# Patient Record
Sex: FEMALE | Race: WHITE | NOT HISPANIC OR LATINO | Employment: FULL TIME | URBAN - METROPOLITAN AREA
[De-identification: names, ages, dates, MRNs, and addresses within clinical notes are randomized per-mention and may not be internally consistent; named-entity substitution may affect disease eponyms.]

---

## 2017-06-28 ENCOUNTER — HOSPITAL ENCOUNTER (EMERGENCY)
Facility: HOSPITAL | Age: 22
Discharge: HOME | End: 2017-06-28
Attending: EMERGENCY MEDICINE
Payer: MEDICAID

## 2017-06-28 VITALS
SYSTOLIC BLOOD PRESSURE: 124 MMHG | RESPIRATION RATE: 18 BRPM | TEMPERATURE: 97 F | WEIGHT: 160 LBS | DIASTOLIC BLOOD PRESSURE: 68 MMHG | BODY MASS INDEX: 32.25 KG/M2 | HEIGHT: 59 IN | HEART RATE: 64 BPM | OXYGEN SATURATION: 98 %

## 2017-06-28 DIAGNOSIS — H66.002 ACUTE SUPPURATIVE OTITIS MEDIA OF LEFT EAR WITHOUT SPONTANEOUS RUPTURE OF TYMPANIC MEMBRANE, RECURRENCE NOT SPECIFIED: Primary | ICD-10-CM

## 2017-06-28 DIAGNOSIS — Z71.6 TOBACCO ABUSE COUNSELING: ICD-10-CM

## 2017-06-28 PROCEDURE — 99283 EMERGENCY DEPT VISIT LOW MDM: CPT

## 2017-06-28 RX ORDER — AMOXICILLIN AND CLAVULANATE POTASSIUM 875; 125 MG/1; MG/1
1 TABLET, FILM COATED ORAL 2 TIMES DAILY
Qty: 14 TABLET | Refills: 0 | Status: SHIPPED | OUTPATIENT
Start: 2017-06-28 | End: 2018-10-26

## 2017-06-28 RX ORDER — DICLOFENAC SODIUM 50 MG/1
50 TABLET, DELAYED RELEASE ORAL 3 TIMES DAILY PRN
Qty: 20 TABLET | Refills: 0 | Status: SHIPPED | OUTPATIENT
Start: 2017-06-28 | End: 2018-10-26

## 2017-06-28 RX ORDER — DEXAMETHASONE 4 MG/1
4 TABLET ORAL DAILY
Qty: 5 TABLET | Refills: 0 | Status: SHIPPED | OUTPATIENT
Start: 2017-06-28 | End: 2017-07-03

## 2017-06-29 NOTE — ED PROVIDER NOTES
SCRIBE #1 NOTE: I, Shelli Zelaya, am scribing for, and in the presence of, Bill Anguiano NP. I have scribed the entire note.      History      Chief Complaint   Patient presents with    Otalgia     Pt reports L sided otalgia x3wks, +sore throat x3days.       Review of patient's allergies indicates:  No Known Allergies     HPI   HPI    6/28/2017, 10:16 PM   History obtained from the patient      History of Present Illness: Mayelin Lynn is a 22 y.o. female patient who presents to the Emergency Department for left sided otalgia which onset gradually 3 weeks ago, but began worsening yesterday. Sx have been intermittent and moderate in severity. No modifying factors noted. No associated sx included. Pt denies any fever, chills, cough, congestion, HA, dizziness, or neck pain. No further complaints at this time.       Arrival mode: Personal vehicle      PCP: Primary Doctor No       Past Medical History:  History reviewed. No pertinent past medical history.    Past Surgical History:  History reviewed. No pertinent surgical history.      Family History:  Reviewed. Not pertinent     Social History:  Social History     Social History Main Topics    Smoking status: Current Every Day Smoker     Packs/day: 0.50     Types: Cigarettes    Smokeless tobacco: Never Used    Alcohol use No    Drug use: Unknown    Sexual activity: Unknown        ROS   Review of Systems   Constitutional: Negative for chills, diaphoresis and fever.   HENT: Positive for ear pain (L). Negative for congestion, facial swelling, hearing loss, sore throat and trouble swallowing.    Respiratory: Negative for cough and shortness of breath.    Cardiovascular: Negative for chest pain.   Gastrointestinal: Negative for nausea and vomiting.   Genitourinary: Negative for dysuria.   Musculoskeletal: Negative for back pain.   Skin: Negative for rash.   Neurological: Negative for dizziness, weakness, light-headedness, numbness and headaches.   Hematological: Does  "not bruise/bleed easily.     Physical Exam      Initial Vitals [06/28/17 2109]   BP Pulse Resp Temp SpO2   124/68 64 18 97.3 °F (36.3 °C) 98 %      MAP       86.67          Physical Exam  Nursing Notes and Vital Signs Reviewed.  Constitutional: Patient is in no acute distress. Well-developed and well-nourished.  Head: Atraumatic. Normocephalic.  Eyes: PERRL. EOM intact. Conjunctivae are not pale. No scleral icterus.  Ears: Right TM normal. Left TM is bulging and erythematic with fluid. No perforation.   Throat: Moist mucous membranes. Posterior oropharynx is symmetric without erythema. Tonsillar exudate is not present. No trismus. Normal handling of secretions. No stridor.  Neck: Supple. Full ROM. No lymphadenopathy.  Cardiovascular: Regular rate. Regular rhythm. No murmurs, rubs, or gallops. Distal pulses are 2+ and symmetric.  Pulmonary/Chest: No respiratory distress. Clear to auscultation bilaterally. No wheezing, rales, or rhonchi.  Musculoskeletal: Moves all extremities. No obvious deformities.   Skin: Warm and dry.  Neurological:  Alert, awake, and appropriate.  Normal speech.  No acute focal neurological deficits are appreciated.  Psychiatric: Normal affect. Good eye contact. Appropriate in content.    ED Course    Procedures  ED Vital Signs:  Vitals:    06/28/17 2109   BP: 124/68   Pulse: 64   Resp: 18   Temp: 97.3 °F (36.3 °C)   TempSrc: Oral   SpO2: 98%   Weight: 72.6 kg (160 lb)   Height: 4' 11" (1.499 m)                The Emergency Provider reviewed the vital signs and test results, which are outlined above.    ED Discussion     10:21 PM Discharge: Initial encounter.  Pt assessed at this time.  Discussed exam results, shared treatment plan, f/u instructions, and specific conditions for return. Answered their questions at this time. Pt understands and agrees to the plan. Pt is stable for discharge.     Discharge Medication List as of 6/28/2017 10:22 PM      START taking these medications    Details "   amoxicillin-clavulanate 875-125mg (AUGMENTIN) 875-125 mg per tablet Take 1 tablet by mouth 2 (two) times daily., Starting Wed 6/28/2017, Print      dexamethasone (DECADRON) 4 MG Tab Take 1 tablet (4 mg total) by mouth once daily., Starting Wed 6/28/2017, Until Mon 7/3/2017, Print      diclofenac (VOLTAREN) 50 MG EC tablet Take 1 tablet (50 mg total) by mouth 3 (three) times daily as needed., Starting Wed 6/28/2017, Print             Follow-up Information     PCP. Schedule an appointment as soon as possible for a visit in 2 days.    Contact information:  [phone]           [facility] - .    Specialty:  Emergency Medicine  Why:  As needed, If symptoms worsen  Contact information:  [address] [facility] [address] [phone]                   Medical Decision Making        Additional MDM:   Smoking Cessation: The patient is a smoker. The patient was counseled on smoking cessation for: 3 minutes. The patient was counseled on tobacco related  health complications.        Scribe Attestation:   Scribe #1: I performed the above scribed service and the documentation accurately describes the services I performed. I attest to the accuracy of the note.    Attending:   Physician Attestation Statement for Scribe #1: I, Bill Anguiano NP, personally performed the services described in this documentation, as scribed by Shelli Zelaya, in my presence, and it is both accurate and complete.          Clinical Impression       ICD-10-CM ICD-9-CM   1. Acute suppurative otitis media of left ear without spontaneous rupture of tympanic membrane, recurrence not specified H66.002 382.00   2. Tobacco abuse counseling Z71.6 V65.42     305.1       Disposition:   Disposition: Discharged  Condition: Stable         Bill Anguiano NP  06/29/17 0041

## 2018-10-26 ENCOUNTER — INITIAL PRENATAL (OUTPATIENT)
Dept: OBSTETRICS AND GYNECOLOGY | Facility: CLINIC | Age: 23
End: 2018-10-26
Payer: MEDICAID

## 2018-10-26 VITALS — DIASTOLIC BLOOD PRESSURE: 58 MMHG | WEIGHT: 145.94 LBS | BODY MASS INDEX: 29.48 KG/M2 | SYSTOLIC BLOOD PRESSURE: 80 MMHG

## 2018-10-26 DIAGNOSIS — Z34.00 PRIMIGRAVIDA, ANTEPARTUM: ICD-10-CM

## 2018-10-26 PROCEDURE — 99999 PR PBB SHADOW E&M-EST. PATIENT-LVL III: CPT | Mod: PBBFAC,,, | Performed by: ADVANCED PRACTICE MIDWIFE

## 2018-10-26 PROCEDURE — 99202 OFFICE O/P NEW SF 15 MIN: CPT | Mod: TH,S$PBB,, | Performed by: ADVANCED PRACTICE MIDWIFE

## 2018-10-26 PROCEDURE — 88175 CYTOPATH C/V AUTO FLUID REDO: CPT

## 2018-10-26 PROCEDURE — 87491 CHLMYD TRACH DNA AMP PROBE: CPT

## 2018-10-26 PROCEDURE — 99213 OFFICE O/P EST LOW 20 MIN: CPT | Mod: PBBFAC,TH | Performed by: ADVANCED PRACTICE MIDWIFE

## 2018-10-26 RX ORDER — PROMETHAZINE HYDROCHLORIDE 12.5 MG/1
12.5 TABLET ORAL 4 TIMES DAILY
Qty: 30 TABLET | Refills: 2 | Status: SHIPPED | OUTPATIENT
Start: 2018-10-26 | End: 2018-12-03

## 2018-10-26 NOTE — PATIENT INSTRUCTIONS
Adapting to Pregnancy: First Trimester  As your body adjusts, you may have to change or limit your daily activities. Youll need more rest. You may also need to use the energy you have more wisely.     Eat stomach-friendly foods like cottage cheese, crackers, or bread throughout the day.   Your changing body  Almost every part of your body is affected as you adapt to pregnancy. The uterus and cervix will begin to soften right away. You may not look very pregnant during the first 3 months. But you are likely to have some common signs of early pregnancy:  · Nausea  · Fatigue  · Frequent urination  · Mood swings  · Bloating of the abdomen  · Missed or light periods (first trimester bleeding)  · Nipple or breast tenderness, breast swelling  Its not too late to start good habits  What matters most is protecting your baby from this moment on. If you smoke, drink alcohol, or use drugs, now is the time to stop. If you need help, talk with your healthcare provider.  · Smoking increases the risk of  stillbirth or having a low-birth-weight baby. If you smoke, quit now.  · Alcohol and drugs have been linked with miscarriage, birth defects, intellectual disability, and low birth weight. Do not drink alcohol or take drugs.  Tips to relieve nausea  Although nausea can happen at any time of the day, it may be worse in the morning. To help prevent nausea:  · Eat small, light meals at frequent intervals.  · Get up slowly. Eat a few unsalted crackers before you get out of bed.  · Avoid smells that bother you.  · Avoid spicy and fatty foods.  · Eat an ice pop in your favorite flavor.  · Get plenty of rest.  · Ask your healthcare provider about taking aden or vitamin B6 for nausea and vomiting.  · Talk with your healthcare provider if you take vitamins that upset your stomach.  Work concerns  The end of the first trimester is a good time to discuss working during pregnancy with your employer. Follow your healthcare providers  "advice if your job requires you to stand for a long time, work with hazardous tools, or even sit at a desk all day. Your workspace, workload, or scheduled hours may need to be adjusted. Perhaps you can change body postures more often or take an extra break.  Advice for travel  Talk to your healthcare provider first, but the second trimester may be the best time for any travel. You may be advised to avoid certain trips while youre pregnant. Food and water can be concerns in developing countries. Travel by car is a good choice, as you can stop, get out, and stretch. Bring snacks and water along. Fasten the lap belt below your belly, low over your hips. Also be sure to wear the shoulder harness.  Intimacy  Unless your healthcare provider tells you to, there is no reason to stop having sex while youre pregnant. You or your partner may notice changes in desire. Desire may be less in the first trimester, due to nausea and fatigue. In the second trimester, sex may be very enjoyable. The third trimester can be a challenge comfort-wise. Try different positions and see whats best for you both.  Date Last Reviewed: 8/16/2015  © 3236-2832 [employer]. [address] [place] [address]. All rights reserved. This information is not intended as a substitute for professional medical care. Always follow your healthcare professional's instructions.        Pregnancy: Your First Trimester Changes  The first trimester is a time of rapid development for your baby. Because your baby is growing so quickly, it is important that you start a healthy lifestyle right away. By the end of the first trimester, your baby has formed all of its major body organs and weighs just over an ounce.     Actual size of baby is 1/4"    Month 1 (Weeks 1 to 4)  The placenta (the organ that nourishes your baby) begins to form. The brain, spinal cord, heart, gastrointestinal tract, and lungs begin to develop. Your baby is about 1/4 inch " "long by the end of the first month.     Actual size of baby is 1"    Month 2 (Weeks 5 to 8)  All of your babys major body organs form. The face, fingers, toes, ears, and eyes appear. By the end of the month, your baby is about 1-inch long.     Actual size of baby is 4"    Month 3 (Weeks 9 to 12)  Your baby can open and close its fists and mouth. The sexual organs begin to form. As the first trimester ends, your baby is about 3-inches long.  Date Last Reviewed: 8/16/2015  © 8440-0122 [employer]. [address] [place] [address]. All rights reserved. This information is not intended as a substitute for professional medical care. Always follow your healthcare professional's instructions.        Comfort Tips During Pregnancy  Talk with your healthcare provider before using pain-relieving medicine at any time during your pregnancy.    First trimester tips  Nausea  · Get up slowly. Eat a few unsalted crackers before you get out of bed.  · Avoid smells that bother you.  · Eat small bland low fat, light high-protein meals at frequent intervals.  · Sip on water, weak tea, or clear soft drinks, like ginger ale. Eat ice chips.  Fatigue  · Take catnaps when you can.  · Get regular exercise.  · Accept help from others.  · Practice good sleep habits, like going to bed and getting up at the same time each day. Use your bed only for sleep and sex.  Mood swings  · Talk about your feelings with others, including other mothers.  · Limit sugar, chocolate, and caffeine.  · Eat a healthy diet. Dont skip meals.  · Get regular exercise.  Headaches  · Get fresh air and exercise.  · Relax and get enough rest.  · Check with your healthcare provider before taking any pain medicines.  Second trimester tips  Here are some suggestions to help you cope:  · To limit ankle swelling, sit with your feet raised or wear support hose.  · If you have pain in your groin and stomach (round ligament pain), avoid sudden twisting " movements.  · For leg cramps, flexing your foot often brings immediate relief. You also may try massaging your calf in long, downward strokes, or stretching your legs before going to bed. Get enough exercise and wear shoes with flexible soles.  Third trimester tips  Reducing heartburn  · Eat small, light meals throughout the day rather than 3 large ones.  · Sleep with your upper body raised 6 inches. Dont lie down until 2 hours after you eat.  Treating constipation  · Eat foods high in fiber (whole-grain foods, fresh fruit and vegetables).  · Drink plenty of water.  · Get regular exercise.  · Discuss other medicines (like docusate and psyllium) with your healthcare provider.  Taking care of your breasts  · Avoid using harsh soaps or alcohol, which can cause excessive dryness.  · Wear nursing bras. They provide more support than regular bras and can be used after pregnancy if you breastfeed.  Getting a good nights sleep  · Take a warm shower before bed.  · Sleep on a firm mattress.  · Lie on your side with 1 leg crossed over the other.  · Use pillows to support arms, legs, and belly.  Date Last Reviewed: 8/16/2015  © 2617-3914 [employer]. [address] [place] [address], [place] [address]. All rights reserved. This information is not intended as a substitute for professional medical care. Always follow your healthcare professional's instructions.

## 2018-10-26 NOTE — PROGRESS NOTES
New OB today.    Consents signed.    Pap, gonorrhea and Chlamydia swab today.    Prenatal labs today.    Ultrasound and visit in 1-2 weeks.    Prenatal vitamins daily.    First trimester precautions.      Coffective counseling sheet Get Ready discussed with mother. Reinforced avoiding induction of labor unless medically indicated as well as comfort measures during labor.  Encouraged mother to download Coffective mobile kathia if she has not already done so. Mother verbalizes understanding.

## 2018-10-27 LAB
C TRACH DNA SPEC QL NAA+PROBE: NOT DETECTED
N GONORRHOEA DNA SPEC QL NAA+PROBE: NOT DETECTED

## 2018-11-08 ENCOUNTER — ROUTINE PRENATAL (OUTPATIENT)
Dept: OBSTETRICS AND GYNECOLOGY | Facility: CLINIC | Age: 23
End: 2018-11-08
Payer: MEDICAID

## 2018-11-08 ENCOUNTER — PROCEDURE VISIT (OUTPATIENT)
Dept: OBSTETRICS AND GYNECOLOGY | Facility: CLINIC | Age: 23
End: 2018-11-08
Payer: MEDICAID

## 2018-11-08 ENCOUNTER — LAB VISIT (OUTPATIENT)
Dept: LAB | Facility: HOSPITAL | Age: 23
End: 2018-11-08
Attending: ADVANCED PRACTICE MIDWIFE
Payer: MEDICAID

## 2018-11-08 VITALS
SYSTOLIC BLOOD PRESSURE: 100 MMHG | WEIGHT: 147.63 LBS | BODY MASS INDEX: 29.81 KG/M2 | DIASTOLIC BLOOD PRESSURE: 70 MMHG

## 2018-11-08 DIAGNOSIS — Z34.00 PRIMIGRAVIDA, ANTEPARTUM: ICD-10-CM

## 2018-11-08 DIAGNOSIS — Z34.81 ENCOUNTER FOR SUPERVISION OF OTHER NORMAL PREGNANCY IN FIRST TRIMESTER: Primary | ICD-10-CM

## 2018-11-08 LAB
ABO + RH BLD: NORMAL
BASOPHILS # BLD AUTO: 0.05 K/UL
BASOPHILS NFR BLD: 0.5 %
BLD GP AB SCN CELLS X3 SERPL QL: NORMAL
DIFFERENTIAL METHOD: NORMAL
EOSINOPHIL # BLD AUTO: 0.1 K/UL
EOSINOPHIL NFR BLD: 0.5 %
ERYTHROCYTE [DISTWIDTH] IN BLOOD BY AUTOMATED COUNT: 12.5 %
HCT VFR BLD AUTO: 38.7 %
HGB BLD-MCNC: 13.2 G/DL
IMM GRANULOCYTES # BLD AUTO: 0.03 K/UL
IMM GRANULOCYTES NFR BLD AUTO: 0.3 %
LYMPHOCYTES # BLD AUTO: 2.3 K/UL
LYMPHOCYTES NFR BLD: 23.4 %
MCH RBC QN AUTO: 28.7 PG
MCHC RBC AUTO-ENTMCNC: 34.1 G/DL
MCV RBC AUTO: 84 FL
MONOCYTES # BLD AUTO: 0.6 K/UL
MONOCYTES NFR BLD: 5.7 %
NEUTROPHILS # BLD AUTO: 6.9 K/UL
NEUTROPHILS NFR BLD: 69.6 %
NRBC BLD-RTO: 0 /100 WBC
PLATELET # BLD AUTO: 284 K/UL
PMV BLD AUTO: 11 FL
RBC # BLD AUTO: 4.6 M/UL
RH BLD: NORMAL
WBC # BLD AUTO: 9.88 K/UL

## 2018-11-08 PROCEDURE — 86762 RUBELLA ANTIBODY: CPT

## 2018-11-08 PROCEDURE — 86592 SYPHILIS TEST NON-TREP QUAL: CPT

## 2018-11-08 PROCEDURE — 36415 COLL VENOUS BLD VENIPUNCTURE: CPT | Mod: PO

## 2018-11-08 PROCEDURE — 85025 COMPLETE CBC W/AUTO DIFF WBC: CPT

## 2018-11-08 PROCEDURE — 76801 OB US < 14 WKS SINGLE FETUS: CPT | Mod: 26,S$PBB,, | Performed by: OBSTETRICS & GYNECOLOGY

## 2018-11-08 PROCEDURE — 99213 OFFICE O/P EST LOW 20 MIN: CPT | Mod: TH,S$PBB,, | Performed by: ADVANCED PRACTICE MIDWIFE

## 2018-11-08 PROCEDURE — 76801 OB US < 14 WKS SINGLE FETUS: CPT | Mod: PBBFAC,PO | Performed by: OBSTETRICS & GYNECOLOGY

## 2018-11-08 PROCEDURE — 87340 HEPATITIS B SURFACE AG IA: CPT

## 2018-11-08 PROCEDURE — 86901 BLOOD TYPING SEROLOGIC RH(D): CPT | Mod: 91

## 2018-11-08 PROCEDURE — 86703 HIV-1/HIV-2 1 RESULT ANTBDY: CPT

## 2018-11-08 PROCEDURE — 99212 OFFICE O/P EST SF 10 MIN: CPT | Mod: PBBFAC,TH,PO,25 | Performed by: ADVANCED PRACTICE MIDWIFE

## 2018-11-08 PROCEDURE — 86901 BLOOD TYPING SEROLOGIC RH(D): CPT

## 2018-11-08 PROCEDURE — 99999 PR PBB SHADOW E&M-EST. PATIENT-LVL II: CPT | Mod: PBBFAC,,, | Performed by: ADVANCED PRACTICE MIDWIFE

## 2018-11-08 NOTE — PROGRESS NOTES
Doing well  U/s NEETA different by over a week. New NEETA 6/9. Pt aware  Plans breastfeeding and probably epidural  New ob labs today

## 2018-11-09 LAB
HBV SURFACE AG SERPL QL IA: NEGATIVE
HIV 1+2 AB+HIV1 P24 AG SERPL QL IA: NEGATIVE
RPR SER QL: NORMAL
RUBV IGG SER-ACNC: 18.1 IU/ML
RUBV IGG SER-IMP: REACTIVE

## 2018-11-12 PROBLEM — O26.899 RH NEGATIVE, ANTEPARTUM: Status: ACTIVE | Noted: 2018-11-12

## 2018-11-12 PROBLEM — Z67.91 RH NEGATIVE, ANTEPARTUM: Status: ACTIVE | Noted: 2018-11-12

## 2018-12-03 ENCOUNTER — ROUTINE PRENATAL (OUTPATIENT)
Dept: OBSTETRICS AND GYNECOLOGY | Facility: CLINIC | Age: 23
End: 2018-12-03
Payer: MEDICAID

## 2018-12-03 VITALS
BODY MASS INDEX: 30.01 KG/M2 | SYSTOLIC BLOOD PRESSURE: 112 MMHG | WEIGHT: 148.56 LBS | DIASTOLIC BLOOD PRESSURE: 62 MMHG

## 2018-12-03 DIAGNOSIS — Z34.82 ENCOUNTER FOR SUPERVISION OF OTHER NORMAL PREGNANCY IN SECOND TRIMESTER: Primary | ICD-10-CM

## 2018-12-03 PROCEDURE — 99999 PR PBB SHADOW E&M-EST. PATIENT-LVL II: CPT | Mod: PBBFAC,,, | Performed by: ADVANCED PRACTICE MIDWIFE

## 2018-12-03 PROCEDURE — 99213 OFFICE O/P EST LOW 20 MIN: CPT | Mod: TH,S$PBB,, | Performed by: ADVANCED PRACTICE MIDWIFE

## 2018-12-03 PROCEDURE — 99212 OFFICE O/P EST SF 10 MIN: CPT | Mod: PBBFAC,TH,PO | Performed by: ADVANCED PRACTICE MIDWIFE

## 2018-12-03 NOTE — PROGRESS NOTES
Doing well  Still with a little nausea, but much better  Discussed quad screen for next visit. Will consider    Coffective counseling sheet Fall In Love discussed with mother. Reinforced immediate skin to skin, the magic first hour, importance of the first feeding and delaying routine procedures. Encouraged mother to download Coffective mobile kathia if she has not already done so. Mother verbalizes understanding.

## 2018-12-11 ENCOUNTER — HOSPITAL ENCOUNTER (EMERGENCY)
Facility: HOSPITAL | Age: 23
Discharge: HOME | End: 2018-12-11
Attending: EMERGENCY MEDICINE
Payer: MEDICAID

## 2018-12-11 VITALS
WEIGHT: 149.06 LBS | BODY MASS INDEX: 30.05 KG/M2 | TEMPERATURE: 98 F | HEART RATE: 76 BPM | HEIGHT: 59 IN | DIASTOLIC BLOOD PRESSURE: 80 MMHG | SYSTOLIC BLOOD PRESSURE: 129 MMHG | OXYGEN SATURATION: 99 % | RESPIRATION RATE: 18 BRPM

## 2018-12-11 DIAGNOSIS — R10.32 BILATERAL LOWER ABDOMINAL CRAMPING: Primary | ICD-10-CM

## 2018-12-11 DIAGNOSIS — R10.31 BILATERAL LOWER ABDOMINAL CRAMPING: Primary | ICD-10-CM

## 2018-12-11 DIAGNOSIS — N93.9 VAGINAL SPOTTING: ICD-10-CM

## 2018-12-11 LAB
BACTERIA #/AREA URNS HPF: NORMAL /HPF
BILIRUB UR QL STRIP: NEGATIVE
CLARITY UR: ABNORMAL
COLOR UR: YELLOW
GLUCOSE UR QL STRIP: NEGATIVE
HGB UR QL STRIP: NEGATIVE
KETONES UR QL STRIP: ABNORMAL
LEUKOCYTE ESTERASE UR QL STRIP: ABNORMAL
MICROSCOPIC COMMENT: NORMAL
NITRITE UR QL STRIP: NEGATIVE
PH UR STRIP: 6 [PH] (ref 5–8)
PROT UR QL STRIP: NEGATIVE
SP GR UR STRIP: 1.01 (ref 1–1.03)
SQUAMOUS #/AREA URNS HPF: 20 /HPF
URN SPEC COLLECT METH UR: ABNORMAL
UROBILINOGEN UR STRIP-ACNC: NEGATIVE EU/DL
WBC #/AREA URNS HPF: 5 /HPF (ref 0–5)

## 2018-12-11 PROCEDURE — 81000 URINALYSIS NONAUTO W/SCOPE: CPT

## 2018-12-11 PROCEDURE — 99284 EMERGENCY DEPT VISIT MOD MDM: CPT

## 2018-12-12 NOTE — ED PROVIDER NOTES
SCRIBE #1 NOTE: I, Hang Martin, am scribing for, and in the presence of, Nash Lafleur Jr., WILLEM. I have scribed the entire note.       History     Chief Complaint   Patient presents with    Vaginal Bleeding     mild bright red bleeding after wiping PTA. no passing clots. +lower abd cramping +14 weeks preg.      Review of patient's allergies indicates:  No Known Allergies      History of Present Illness     HPI    12/11/2018, 6:56 PM  History obtained from the patient      History of Present Illness: Mayelin Lynn is a 23 y.o. female patient who presents to the Emergency Department for evaluation of vaginal bleeding which onset suddenly this evening. Pt presents to ED for further evaluation after noting blood when wiping. Pt reports she is currently 14 weeks gestation and had intercourse this morning night. Pt is followed by Tameka (Midwife) and denies complications with her current pregnancy. Symptoms are intermittent and moderate in severity. Exacerbated by wiping and relieved by nothing. Associated sxs include lower abdominal pain. Patient denies any fever, chills, N/V/D, abdominal distention, dysuria, hematuria, vaginal discharge, dysuria, hematuria, urinary urgency/frequency, HA, weakness, and all other sxs at this time. No further complaints or concerns at this time.     Arrival mode: Personal vehicle    PCP: Primary Doctor No        Past Medical History:  History reviewed, not pertinent.      Past Surgical History:  Past Surgical History:   Procedure Laterality Date    ORAL ANTRAL FISTULA CLOSURE      TONSILLECTOMY, ADENOIDECTOMY           Family History:  Family History   Adopted: Yes       Social History:  Social History     Tobacco Use    Smoking status: Former Smoker     Packs/day: 0.50     Types: Cigarettes    Smokeless tobacco: Never Used   Substance and Sexual Activity    Alcohol use: No    Drug use: No    Sexual activity: Yes     Partners: Male     Birth control/protection: None         Review of Systems     Review of Systems   Constitutional: Negative for chills and fever.   HENT: Negative for congestion and trouble swallowing.    Respiratory: Negative for cough and shortness of breath.    Cardiovascular: Negative for chest pain.   Gastrointestinal: Positive for abdominal pain (Lower). Negative for abdominal distention, blood in stool, constipation, diarrhea, nausea and vomiting.   Genitourinary: Positive for vaginal bleeding (With wiping). Negative for decreased urine volume, difficulty urinating, dysuria, frequency, hematuria and vaginal discharge.   Musculoskeletal: Negative for back pain, gait problem and neck pain.   Skin: Negative for rash.   Neurological: Negative for dizziness, syncope, weakness, light-headedness, numbness and headaches.   Psychiatric/Behavioral: Negative for confusion.   All other systems reviewed and are negative.     Physical Exam     Initial Vitals [12/11/18 1846]   BP Pulse Resp Temp SpO2   129/80 76 18 98.1 °F (36.7 °C) 99 %      MAP       --          Physical Exam  Nursing Notes and Vital Signs Reviewed.  Constitutional: Patient is in no acute distress. Well-developed and well-nourished.  Head: Atraumatic. Normocephalic.  Eyes: PERRL. EOM intact. Conjunctivae are not pale. No scleral icterus.  ENT: Mucous membranes are moist. Oropharynx is clear and symmetric.    Neck: Supple. Full ROM. No lymphadenopathy.  Cardiovascular: Regular rate. Regular rhythm.  Pulmonary/Chest: No respiratory distress. Clear to auscultation bilaterally. No wheezing or rales.  Abdominal: Soft and non-distended.  Mild lower abdominal tenderness.  No rebound, guarding, or rigidity.   Genitourinary: No CVA tenderness  Pelvic: A female chaperone was present for this examination. Nl external inspection. No lesions or abnormalities were visible on the labia majora or minora. Cervical os is closed. There is no CMT. There is no blood in the vaginal vault. No discharge. No adnexal tenderness. No  "adnexal masses.  Musculoskeletal: Moves all extremities. No obvious deformities.  Skin: Warm and dry.  Neurological:  Alert, awake, and appropriate.  Normal speech.  No acute focal neurological deficits are appreciated.  Psychiatric: Normal affect. Good eye contact. Anxious.     ED Course   Procedures  ED Vital Signs:  Vitals:    12/11/18 1846   BP: 129/80   Pulse: 76   Resp: 18   Temp: 98.1 °F (36.7 °C)   TempSrc: Oral   SpO2: 99%   Weight: 67.6 kg (149 lb 0.5 oz)   Height: 4' 11" (1.499 m)       Abnormal Lab Results:  Labs Reviewed   URINALYSIS, REFLEX TO URINE CULTURE - Abnormal; Notable for the following components:       Result Value    Appearance, UA Hazy (*)     Ketones, UA Trace (*)     Leukocytes, UA 2+ (*)     All other components within normal limits    Narrative:     Preferred Collection Type->Urine, Clean Catch   URINALYSIS MICROSCOPIC    Narrative:     Preferred Collection Type->Urine, Clean Catch        All Lab Results:  Results for orders placed or performed during the hospital encounter of 12/11/18   Urinalysis, Reflex to Urine Culture Urine, Clean Catch   Result Value Ref Range    Specimen UA Urine, Clean Catch     Color, UA Yellow Yellow, Straw, [place]    Appearance, UA Hazy (A) Clear    pH, UA 6.0 5.0 - 8.0    Specific Gravity, UA 1.010 1.005 - 1.030    Protein, UA Negative Negative    Glucose, UA Negative Negative    Ketones, UA Trace (A) Negative    Bilirubin (UA) Negative Negative    Occult Blood UA Negative Negative    Nitrite, UA Negative Negative    Urobilinogen, UA Negative <2.0 EU/dL    Leukocytes, UA 2+ (A) Negative   Urinalysis Microscopic   Result Value Ref Range    WBC, UA 5 0 - 5 /hpf    Bacteria, UA Occasional None-Occ /hpf    Squam Epithel, UA 20 /hpf    Microscopic Comment SEE COMMENT               The Emergency Provider reviewed the vital signs and test results, which are outlined above.     ED Discussion     7:12 PM: Post fetal movement and cardiac activity noted with bed side " US.    7:33 PM: Reassessed pt at this time. Pt is awake, alert, and in NAD. Pt states her condition has improved at this time. Discussed with pt all pertinent ED information and results. Discussed pt dx and plan of tx. Gave pt all f/u and return to the ED instructions. All questions and concerns were addressed at this time. Pt expresses understanding of information and instructions, and is comfortable with plan to discharge. Pt is stable for discharge.    I discussed with patient and/or family/caretaker that evaluation in the ED does not suggest any emergent or life threatening medical conditions requiring immediate intervention beyond what was provided in the ED, and I believe patient is safe for discharge.  Regardless, an unremarkable evaluation in the ED does not preclude the development or presence of a serious of life threatening condition. As such, patient was instructed to return immediately for any worsening or change in current symptoms.        ED Medication(s):  Medications - No data to display      Follow-up Information     Tameka Eubanks CNM In 3 days.    Specialty:  Obstetrics  Contact information:  [address] [facility] [id number] [phone]             [facility] - .    Specialty:  Emergency Medicine  Why:  If symptoms worsen  Contact information:  [address] [facility] [address] [phone]                       Medical Decision Making:   Clinical Tests:   Lab Tests: Ordered and Reviewed             Scribe Attestation:   Scribe #1: I performed the above scribed service and the documentation accurately describes the services I performed. I attest to the accuracy of the note.     Attending:   Physician Attestation Statement for Scribe #1: I, Nash Lafleur Jr., WILLEM, personally performed the services described in this documentation, as scribed by Hang Martin, in my presence, and it is both accurate and complete.           Clinical Impression        ICD-10-CM ICD-9-CM   1. Bilateral lower abdominal cramping R10.31 789.09    R10.32    2. Vaginal spotting N93.9 623.8       Disposition:   Disposition: Discharged  Condition: Stable         Nash Lafleur Jr., [facility]  12/11/18 7675

## 2019-01-08 ENCOUNTER — TELEPHONE (OUTPATIENT)
Dept: OBSTETRICS AND GYNECOLOGY | Facility: CLINIC | Age: 24
End: 2019-01-08

## 2019-01-08 NOTE — TELEPHONE ENCOUNTER
Spoke with patient; reminded her we just asked her if she wanted to come in a little earlier.  Never said she had to reschedule. Patient states she can come at 1:30.

## 2019-01-08 NOTE — TELEPHONE ENCOUNTER
Pt. Called and stated that she received a call to reschedule her appointment because sarthak would not be in office on that day. Pt. Is upset because this is the second time that she has had to reschedule and her boyfriend took off this day to come with her. Pt. Offered patient appt. At the [facility] location. Pt. Declined and stated she only wants to be seen in [place] by AMELIA Enlgish. Pt. States the earliest she can come on 01/11/19 is at 1:30pm. Informed patient that I will send this to sarthak's staff to see if she can be worked into the schedule. Pt. Verbalized understanding.

## 2019-01-30 ENCOUNTER — ROUTINE PRENATAL (OUTPATIENT)
Dept: OBSTETRICS AND GYNECOLOGY | Facility: CLINIC | Age: 24
End: 2019-01-30
Payer: MEDICAID

## 2019-01-30 ENCOUNTER — PROCEDURE VISIT (OUTPATIENT)
Dept: OBSTETRICS AND GYNECOLOGY | Facility: CLINIC | Age: 24
End: 2019-01-30
Payer: MEDICAID

## 2019-01-30 VITALS
DIASTOLIC BLOOD PRESSURE: 68 MMHG | SYSTOLIC BLOOD PRESSURE: 108 MMHG | BODY MASS INDEX: 32.33 KG/M2 | WEIGHT: 160.06 LBS

## 2019-01-30 DIAGNOSIS — Z34.00 PRIMIGRAVIDA, ANTEPARTUM: Primary | ICD-10-CM

## 2019-01-30 DIAGNOSIS — F41.9 ANXIETY AND DEPRESSION: ICD-10-CM

## 2019-01-30 DIAGNOSIS — Z36.3 ANTENATAL SCREENING FOR MALFORMATION USING ULTRASONICS: ICD-10-CM

## 2019-01-30 DIAGNOSIS — F41.9 ANXIETY: ICD-10-CM

## 2019-01-30 DIAGNOSIS — F32.A ANXIETY AND DEPRESSION: ICD-10-CM

## 2019-01-30 DIAGNOSIS — Z3A.21 21 WEEKS GESTATION OF PREGNANCY: ICD-10-CM

## 2019-01-30 PROCEDURE — 99212 OFFICE O/P EST SF 10 MIN: CPT | Mod: TH,S$PBB,, | Performed by: MIDWIFE

## 2019-01-30 PROCEDURE — 99999 PR PBB SHADOW E&M-EST. PATIENT-LVL III: ICD-10-PCS | Mod: PBBFAC,,, | Performed by: MIDWIFE

## 2019-01-30 PROCEDURE — 99999 PR PBB SHADOW E&M-EST. PATIENT-LVL III: CPT | Mod: PBBFAC,,, | Performed by: MIDWIFE

## 2019-01-30 PROCEDURE — 76805 OB US >/= 14 WKS SNGL FETUS: CPT | Mod: 26,S$PBB,, | Performed by: OBSTETRICS & GYNECOLOGY

## 2019-01-30 PROCEDURE — 76805 OB US >/= 14 WKS SNGL FETUS: CPT | Mod: PBBFAC | Performed by: OBSTETRICS & GYNECOLOGY

## 2019-01-30 PROCEDURE — 76805 PR US, OB 14+WKS, TRANSABD, SINGLE GESTATION: ICD-10-PCS | Mod: 26,S$PBB,, | Performed by: OBSTETRICS & GYNECOLOGY

## 2019-01-30 PROCEDURE — 99213 OFFICE O/P EST LOW 20 MIN: CPT | Mod: PBBFAC,TH | Performed by: MIDWIFE

## 2019-01-30 PROCEDURE — 99212 PR OFFICE/OUTPT VISIT, EST, LEVL II, 10-19 MIN: ICD-10-PCS | Mod: TH,S$PBB,, | Performed by: MIDWIFE

## 2019-01-30 RX ORDER — HYDROXYZINE PAMOATE 50 MG/1
50 CAPSULE ORAL EVERY 6 HOURS PRN
Qty: 30 CAPSULE | Refills: 0 | Status: SHIPPED | OUTPATIENT
Start: 2019-01-30 | End: 2019-03-21

## 2019-01-30 NOTE — PATIENT INSTRUCTIONS
If You Are Rh Negative     A Rho(D) immune globulin injection protects against Rh disease in this and future pregnancies.   If youre Rh negative, ask your healthcare provider about getting treated with Rho(D) immune globulin. Even if you miscarry or dont deliver the baby, you will still need treatment. The health of any baby you have in the future depends on it.  When are you treated?  If your blood has not formed Rh antibodies, youll be treated during week 28 of your pregnancy. You also may be treated any time theres a chance that fetal blood has mixed with yours. For example, this might be after an amniocentesis, a prenatal test. Or it might be if you have vaginal bleeding earlier than 28 weeks. Treatment is an injection of a medicine called Rho(D) immune globulin. Rho(D) immune globulin stops Rh antibodies from forming. It wont harm you or the fetus. After you give birth, your babys blood will be tested. If its Rh positive, youll be given Rho(D) immune globulin again within 3 days. If its Rh negative, you wont need Rho(D) immune globulin until your next pregnancy.  Preventing future problems  Your chance of forming Rh antibodies increases with each pregnancy. This is true even for an ectopic pregnancy (the fertilized egg is outside the uterus). It is also true for pregnancies that end in miscarriage or . In these cases, you will most likely get a Rho(D) immune globulin injection. This is because your body can make Rh antibodies even if you dont deliver a baby. Rh antibodies can cause problems in future pregnancies.  If you have Rh antibodies  If antibodies have already formed (sensitization), Rho(D) immune globulin cant protect the fetus. You and the fetus will need special care during pregnancy. Your healthcare provider will explain the details to you.   Date Last Reviewed: 2016  © 0231-2065 [employer]. [address] [place] [address], [place] [address]. All rights reserved. This  information is not intended as a substitute for professional medical care. Always follow your healthcare professional's instructions.        Rh Typing  Does this test have other names?  Rh incompatibility, Rh factor  What is this test?  Rhesus (Rh) typing is used to determine whether you have a specific protein called Rh factor on the outer layer of your red blood cells. If you do not have Rh factor in your blood, you are Rh negative. If you do have Rh factor in your blood, you are Rh positive. Most people are Rh positive, but Rh negative blood types can be passed down from parents to children.  Rh typing is important during pregnancy. If you are Rh negative and your baby is Rh positive, you may have an Rh incompatibility. During pregnancy, it's possible that some of your baby's blood could pass through the placenta into your body. Your body may react to the baby's blood as a foreign substance and make antibodies against it. This can cause a miscarriage, anemia, and problems in later pregnancies. The first pregnancy is usually not affected by Rh incompatibility because the baby is often born before many of the antibodies develop. These risks can be lowered by a shot or shots of Rh immunoglobulin during each pregnancy.  Why do I need this test?  Pregnant women typically have this test at their first prenatal appointment. You may need this test if you:  · Are pregnant  · Are thinking about becoming pregnant  · Are the father of an unborn baby  · Are Rh negative and having a baby with a man who is Rh positive  · Need a blood transfusion  · Are donating blood  What other tests might I have along with this test?  If you have an Rh typing test and the results show you are Rh negative, you may need to have another blood test called an antibody screen. The antibody screen looks for antibodies in your blood. This means that you have been exposed to Rh positive blood and developed antibodies to it. You may need multiple  antibody screens during your pregnancy and one at the time of delivery.  You may also need an amniocentesis if the baby's father cannot or will not have the Rh typing blood test. During amniocentesis, a needle is put into the uterus to remove a small amount of amniotic fluid for testing.  What do my test results mean?  Many things may affect your lab test results. These include the method each lab uses to do the test. Even if your test results are different from the normal value, you may not have a problem. To learn what the results mean for you, talk with your healthcare provider.  Having Rh factor in your blood means you are Rh positive. If you do not have Rh factor in your blood, you are Rh negative. If you are Rh negative and having a baby with a man who is Rh positive, you could be at risk for pregnancy complications.  How is this test done?  The test requires a blood sample, which is drawn through a needle from a vein in your arm.  Does this test pose any risks?  The risks are minor. The finger prick or needle may feel uncomfortable or painful. You may experience bruising, soreness, or pain in your hand or arm at the puncture spot. These symptoms usually go away soon after the test is over.  What might affect my test results?  Nothing is likely to affect your test results. This includes your diet, lifestyle, and medicines.  How do I get ready for this test?  A blood test rarely requires any preparation. You can probably eat, drink, and take any medicine as usual, but check with your healthcare provider to be sure. Remember to tell your provider about any medicines or supplements that you take.      © 0052-1030 [employer]. [address] [place] [address], [place] [address]. All rights reserved. This information is not intended as a substitute for professional medical care. Always follow your healthcare professional's instructions.        Rh-Negative Screening    If you have Rh-negative blood, your baby  may be at risk for health problems. This is true only if your baby has Rh-positive blood. A simple test followed by treatment can help prevent problems.  What are the risks?  If the blood of your baby is Rh positive, your Rh-negative blood may form antibodies. These antibodies will attack the Rh-positive blood. This is called Rh disease. Rh disease can cause your baby to lose blood cells or have other health problems. Medical treatment can prevent Rh disease by keeping antibodies from forming.  How are you tested?  A simple blood test shows if youre Rh negative. This test is done very early in your pregnancy. If youre Rh negative, youll have a second blood test near week 28 of pregnancy. This test will check whether or not your blood contains Rh antibodies.  Date Last Reviewed: 11/1/2016  © 1294-0141 [employer]. [address] [place] [address], [place] [address]. All rights reserved. This information is not intended as a substitute for professional medical care. Always follow your healthcare professional's instructions.

## 2019-01-30 NOTE — PROGRESS NOTES
Doing well, good fm  Pt has not been seen for several weeks, mom  shortly after last visit of overdose  Pt is battling with depression, desires counseling, referral made to Psych  Some anxiety, rx for vistaril  Anatomy US today  rtc 4 wks

## 2019-02-28 ENCOUNTER — ROUTINE PRENATAL (OUTPATIENT)
Dept: OBSTETRICS AND GYNECOLOGY | Facility: CLINIC | Age: 24
End: 2019-02-28
Payer: MEDICAID

## 2019-02-28 VITALS
BODY MASS INDEX: 32.55 KG/M2 | WEIGHT: 161.19 LBS | SYSTOLIC BLOOD PRESSURE: 120 MMHG | DIASTOLIC BLOOD PRESSURE: 70 MMHG

## 2019-02-28 DIAGNOSIS — Z34.82 ENCOUNTER FOR SUPERVISION OF OTHER NORMAL PREGNANCY IN SECOND TRIMESTER: Primary | ICD-10-CM

## 2019-02-28 PROBLEM — Z34.00 PRIMIGRAVIDA, ANTEPARTUM: Status: RESOLVED | Noted: 2018-10-26 | Resolved: 2019-02-28

## 2019-02-28 PROCEDURE — 99212 OFFICE O/P EST SF 10 MIN: CPT | Mod: PBBFAC,TH,PO | Performed by: ADVANCED PRACTICE MIDWIFE

## 2019-02-28 PROCEDURE — 99213 PR OFFICE/OUTPT VISIT, EST, LEVL III, 20-29 MIN: ICD-10-PCS | Mod: TH,S$PBB,, | Performed by: ADVANCED PRACTICE MIDWIFE

## 2019-02-28 PROCEDURE — 99213 OFFICE O/P EST LOW 20 MIN: CPT | Mod: TH,S$PBB,, | Performed by: ADVANCED PRACTICE MIDWIFE

## 2019-02-28 PROCEDURE — 99999 PR PBB SHADOW E&M-EST. PATIENT-LVL II: CPT | Mod: PBBFAC,,, | Performed by: ADVANCED PRACTICE MIDWIFE

## 2019-02-28 PROCEDURE — 99999 PR PBB SHADOW E&M-EST. PATIENT-LVL II: ICD-10-PCS | Mod: PBBFAC,,, | Performed by: ADVANCED PRACTICE MIDWIFE

## 2019-02-28 NOTE — PROGRESS NOTES
Doing well  28 week labs and rhogam next visit  PTL talk  Questions answered  List of psych providers provided to pt. She would like to start counseling secondary to grieving her mothers death    [employer] counseling sheet Nourish discussed with mother. Reinforced basic breastfeeding position and latch as well as proper hand expression technique. Encouraged mother to download [employer] mobile kathia if she has not already done so.  Mother verbalizes understanding.

## 2019-03-21 ENCOUNTER — ROUTINE PRENATAL (OUTPATIENT)
Dept: OBSTETRICS AND GYNECOLOGY | Facility: CLINIC | Age: 24
End: 2019-03-21
Payer: MEDICAID

## 2019-03-21 ENCOUNTER — LAB VISIT (OUTPATIENT)
Dept: LAB | Facility: HOSPITAL | Age: 24
End: 2019-03-21
Attending: ADVANCED PRACTICE MIDWIFE
Payer: MEDICAID

## 2019-03-21 VITALS
BODY MASS INDEX: 33.26 KG/M2 | SYSTOLIC BLOOD PRESSURE: 120 MMHG | DIASTOLIC BLOOD PRESSURE: 68 MMHG | WEIGHT: 164.69 LBS

## 2019-03-21 DIAGNOSIS — Z34.83 ENCOUNTER FOR SUPERVISION OF OTHER NORMAL PREGNANCY IN THIRD TRIMESTER: Primary | ICD-10-CM

## 2019-03-21 DIAGNOSIS — Z34.82 ENCOUNTER FOR SUPERVISION OF OTHER NORMAL PREGNANCY IN SECOND TRIMESTER: ICD-10-CM

## 2019-03-21 LAB
ABO + RH BLD: NORMAL
BASOPHILS # BLD AUTO: 0.05 K/UL
BASOPHILS NFR BLD: 0.4 %
BLD GP AB SCN CELLS X3 SERPL QL: NORMAL
DIFFERENTIAL METHOD: ABNORMAL
EOSINOPHIL # BLD AUTO: 0.1 K/UL
EOSINOPHIL NFR BLD: 0.9 %
ERYTHROCYTE [DISTWIDTH] IN BLOOD BY AUTOMATED COUNT: 13.2 %
GLUCOSE SERPL-MCNC: 124 MG/DL
HCT VFR BLD AUTO: 31.2 %
HGB BLD-MCNC: 10.1 G/DL
IMM GRANULOCYTES # BLD AUTO: 0.14 K/UL
IMM GRANULOCYTES NFR BLD AUTO: 1 %
LYMPHOCYTES # BLD AUTO: 2.1 K/UL
LYMPHOCYTES NFR BLD: 15.3 %
MCH RBC QN AUTO: 28.2 PG
MCHC RBC AUTO-ENTMCNC: 32.4 G/DL
MCV RBC AUTO: 87 FL
MONOCYTES # BLD AUTO: 1 K/UL
MONOCYTES NFR BLD: 7.1 %
NEUTROPHILS # BLD AUTO: 10.5 K/UL
NEUTROPHILS NFR BLD: 75.3 %
NRBC BLD-RTO: 0 /100 WBC
PLATELET # BLD AUTO: 316 K/UL
PMV BLD AUTO: 11.3 FL
RBC # BLD AUTO: 3.58 M/UL
WBC # BLD AUTO: 13.88 K/UL

## 2019-03-21 PROCEDURE — 86703 HIV-1/HIV-2 1 RESULT ANTBDY: CPT

## 2019-03-21 PROCEDURE — 99999 PR PBB SHADOW E&M-EST. PATIENT-LVL II: CPT | Mod: PBBFAC,,, | Performed by: ADVANCED PRACTICE MIDWIFE

## 2019-03-21 PROCEDURE — 90471 IMMUNIZATION ADMIN: CPT | Mod: PBBFAC,PO

## 2019-03-21 PROCEDURE — 36415 COLL VENOUS BLD VENIPUNCTURE: CPT | Mod: PO

## 2019-03-21 PROCEDURE — 86901 BLOOD TYPING SEROLOGIC RH(D): CPT

## 2019-03-21 PROCEDURE — 99212 OFFICE O/P EST SF 10 MIN: CPT | Mod: PBBFAC,TH,PO | Performed by: ADVANCED PRACTICE MIDWIFE

## 2019-03-21 PROCEDURE — 99213 OFFICE O/P EST LOW 20 MIN: CPT | Mod: TH,S$PBB,, | Performed by: ADVANCED PRACTICE MIDWIFE

## 2019-03-21 PROCEDURE — 99213 PR OFFICE/OUTPT VISIT, EST, LEVL III, 20-29 MIN: ICD-10-PCS | Mod: TH,S$PBB,, | Performed by: ADVANCED PRACTICE MIDWIFE

## 2019-03-21 PROCEDURE — 86592 SYPHILIS TEST NON-TREP QUAL: CPT

## 2019-03-21 PROCEDURE — 85025 COMPLETE CBC W/AUTO DIFF WBC: CPT

## 2019-03-21 PROCEDURE — 99999 PR PBB SHADOW E&M-EST. PATIENT-LVL II: ICD-10-PCS | Mod: PBBFAC,,, | Performed by: ADVANCED PRACTICE MIDWIFE

## 2019-03-21 PROCEDURE — 82950 GLUCOSE TEST: CPT

## 2019-03-21 NOTE — PROGRESS NOTES
Doing well  28 week labs today  Rhogam and Tdap given  Questions answered    Patient viewed [employer]s video, Nourish and was provided with [facility] handouts: A Good Latch and Tips for a More Comfortable Labor. Discussed nonpharmacological pain relief methods for labor, techniques and benefits of effective breastfeeding position and latch, and basic breastfeeding management. Encouraged patient to attend [facility]s Prenatal Breastfeeding Class and to download the [employer] mobile kathia if she has not already done so. Patient verbalizes understanding.   Handout A Good Latch and Tips for a More Comfortable Labor provided

## 2019-03-22 LAB
HIV 1+2 AB+HIV1 P24 AG SERPL QL IA: NEGATIVE
RPR SER QL: NORMAL

## 2019-04-04 ENCOUNTER — ROUTINE PRENATAL (OUTPATIENT)
Dept: OBSTETRICS AND GYNECOLOGY | Facility: CLINIC | Age: 24
End: 2019-04-04
Payer: MEDICAID

## 2019-04-04 VITALS
WEIGHT: 166.56 LBS | DIASTOLIC BLOOD PRESSURE: 60 MMHG | SYSTOLIC BLOOD PRESSURE: 110 MMHG | BODY MASS INDEX: 33.64 KG/M2

## 2019-04-04 DIAGNOSIS — Z36.89 ENCOUNTER FOR ULTRASOUND TO CHECK FETAL GROWTH: Primary | ICD-10-CM

## 2019-04-04 DIAGNOSIS — O99.013 ANEMIA DURING PREGNANCY IN THIRD TRIMESTER: ICD-10-CM

## 2019-04-04 PROCEDURE — 99999 PR PBB SHADOW E&M-EST. PATIENT-LVL II: CPT | Mod: PBBFAC,,, | Performed by: ADVANCED PRACTICE MIDWIFE

## 2019-04-04 PROCEDURE — 99213 PR OFFICE/OUTPT VISIT, EST, LEVL III, 20-29 MIN: ICD-10-PCS | Mod: TH,S$PBB,, | Performed by: ADVANCED PRACTICE MIDWIFE

## 2019-04-04 PROCEDURE — 99999 PR PBB SHADOW E&M-EST. PATIENT-LVL II: ICD-10-PCS | Mod: PBBFAC,,, | Performed by: ADVANCED PRACTICE MIDWIFE

## 2019-04-04 PROCEDURE — 99212 OFFICE O/P EST SF 10 MIN: CPT | Mod: PBBFAC,TH,PO | Performed by: ADVANCED PRACTICE MIDWIFE

## 2019-04-04 PROCEDURE — 99213 OFFICE O/P EST LOW 20 MIN: CPT | Mod: TH,S$PBB,, | Performed by: ADVANCED PRACTICE MIDWIFE

## 2019-04-04 RX ORDER — FERROUS SULFATE 325(65) MG
325 TABLET ORAL DAILY
Qty: 30 TABLET | Refills: 3 | Status: SHIPPED | OUTPATIENT
Start: 2019-04-04 | End: 2020-04-03

## 2019-04-04 NOTE — PROGRESS NOTES
Doing well  Discussed 28 week labs. Anemia, recommendations made. Rx iron to pharmacy  PTL talk  U/s for growth next visit    Patient viewed [employer] video, Protect Breastfeeding and was provided with [facility] handout: Risks of Formula Feeding. Discussed importance of exclusive breastfeeding for the first 6 months and continuing to breastfeed after the introduction of complementary foods, risks of supplementation, benefits of feeding on demand, the impact of feeding frequency on milk supply, and basic management of breastfeeding. Encouraged patient to attend [facility]s Prenatal Breastfeeding Class and to download the [employer] mobile kathia if she has not already done so. Patient verbalizes understanding.

## 2019-04-16 ENCOUNTER — PROCEDURE VISIT (OUTPATIENT)
Dept: OBSTETRICS AND GYNECOLOGY | Facility: CLINIC | Age: 24
End: 2019-04-16
Payer: MEDICAID

## 2019-04-16 ENCOUNTER — ROUTINE PRENATAL (OUTPATIENT)
Dept: OBSTETRICS AND GYNECOLOGY | Facility: CLINIC | Age: 24
End: 2019-04-16
Payer: MEDICAID

## 2019-04-16 VITALS
WEIGHT: 168.19 LBS | DIASTOLIC BLOOD PRESSURE: 60 MMHG | SYSTOLIC BLOOD PRESSURE: 108 MMHG | BODY MASS INDEX: 33.97 KG/M2

## 2019-04-16 DIAGNOSIS — Z36.89 ENCOUNTER FOR ULTRASOUND TO CHECK FETAL GROWTH: ICD-10-CM

## 2019-04-16 DIAGNOSIS — Z34.83 ENCOUNTER FOR SUPERVISION OF OTHER NORMAL PREGNANCY IN THIRD TRIMESTER: Primary | ICD-10-CM

## 2019-04-16 PROCEDURE — 99213 OFFICE O/P EST LOW 20 MIN: CPT | Mod: TH,S$PBB,, | Performed by: ADVANCED PRACTICE MIDWIFE

## 2019-04-16 PROCEDURE — 76816 OB US FOLLOW-UP PER FETUS: CPT | Mod: PBBFAC,PO | Performed by: OBSTETRICS & GYNECOLOGY

## 2019-04-16 PROCEDURE — 99212 OFFICE O/P EST SF 10 MIN: CPT | Mod: PBBFAC,TH,PO,25 | Performed by: ADVANCED PRACTICE MIDWIFE

## 2019-04-16 PROCEDURE — 76816 PR  US,PREGNANT UTERUS,F/U,TRANSABD APP: ICD-10-PCS | Mod: 26,S$PBB,, | Performed by: OBSTETRICS & GYNECOLOGY

## 2019-04-16 PROCEDURE — 76819 PR US, OB, FETAL BIOPHYSICAL, W/O NST: ICD-10-PCS | Mod: 26,S$PBB,, | Performed by: OBSTETRICS & GYNECOLOGY

## 2019-04-16 PROCEDURE — 99213 PR OFFICE/OUTPT VISIT, EST, LEVL III, 20-29 MIN: ICD-10-PCS | Mod: TH,S$PBB,, | Performed by: ADVANCED PRACTICE MIDWIFE

## 2019-04-16 PROCEDURE — 76816 OB US FOLLOW-UP PER FETUS: CPT | Mod: 26,S$PBB,, | Performed by: OBSTETRICS & GYNECOLOGY

## 2019-04-16 PROCEDURE — 76819 FETAL BIOPHYS PROFIL W/O NST: CPT | Mod: 26,S$PBB,, | Performed by: OBSTETRICS & GYNECOLOGY

## 2019-04-16 PROCEDURE — 76819 FETAL BIOPHYS PROFIL W/O NST: CPT | Mod: PBBFAC,PO | Performed by: OBSTETRICS & GYNECOLOGY

## 2019-04-16 PROCEDURE — 99999 PR PBB SHADOW E&M-EST. PATIENT-LVL II: CPT | Mod: PBBFAC,,, | Performed by: ADVANCED PRACTICE MIDWIFE

## 2019-04-16 PROCEDURE — 99999 PR PBB SHADOW E&M-EST. PATIENT-LVL II: ICD-10-PCS | Mod: PBBFAC,,, | Performed by: ADVANCED PRACTICE MIDWIFE

## 2019-04-16 RX ORDER — SERTRALINE HYDROCHLORIDE 50 MG/1
50 TABLET, FILM COATED ORAL DAILY
Qty: 30 TABLET | Refills: 2 | Status: SHIPPED | OUTPATIENT
Start: 2019-04-16 | End: 2021-05-18

## 2019-04-16 NOTE — PROGRESS NOTES
Doing well  Questions answered  PTL talk  Taking iron for anemia  Having some depression. Does not want to harm herself or anyone else. Desires medication. Pt made aware Cat C.  States she does have someone to talk to. Rx Zoloft sent to pharmacy

## 2019-04-30 ENCOUNTER — ROUTINE PRENATAL (OUTPATIENT)
Dept: OBSTETRICS AND GYNECOLOGY | Facility: CLINIC | Age: 24
End: 2019-04-30
Payer: MEDICAID

## 2019-04-30 VITALS
SYSTOLIC BLOOD PRESSURE: 102 MMHG | WEIGHT: 167.25 LBS | BODY MASS INDEX: 33.77 KG/M2 | DIASTOLIC BLOOD PRESSURE: 64 MMHG

## 2019-04-30 DIAGNOSIS — Z34.83 ENCOUNTER FOR SUPERVISION OF OTHER NORMAL PREGNANCY IN THIRD TRIMESTER: Primary | ICD-10-CM

## 2019-04-30 PROCEDURE — 99211 OFF/OP EST MAY X REQ PHY/QHP: CPT | Mod: PBBFAC,TH,PO | Performed by: ADVANCED PRACTICE MIDWIFE

## 2019-04-30 PROCEDURE — 99214 OFFICE O/P EST MOD 30 MIN: CPT | Mod: TH,S$PBB,, | Performed by: ADVANCED PRACTICE MIDWIFE

## 2019-04-30 PROCEDURE — 99999 PR PBB SHADOW E&M-EST. PATIENT-LVL I: CPT | Mod: PBBFAC,,, | Performed by: ADVANCED PRACTICE MIDWIFE

## 2019-04-30 PROCEDURE — 99214 PR OFFICE/OUTPT VISIT, EST, LEVL IV, 30-39 MIN: ICD-10-PCS | Mod: TH,S$PBB,, | Performed by: ADVANCED PRACTICE MIDWIFE

## 2019-04-30 PROCEDURE — 99999 PR PBB SHADOW E&M-EST. PATIENT-LVL I: ICD-10-PCS | Mod: PBBFAC,,, | Performed by: ADVANCED PRACTICE MIDWIFE

## 2019-04-30 NOTE — PROGRESS NOTES
Doing well, no complaints.   Educated on labor precautions/kick counts.  Educated on prep for delivery and baby.

## 2019-05-07 ENCOUNTER — HOSPITAL ENCOUNTER (OUTPATIENT)
Facility: HOSPITAL | Age: 24
Discharge: HOME | End: 2019-05-07
Attending: OBSTETRICS & GYNECOLOGY | Admitting: OBSTETRICS & GYNECOLOGY
Payer: MEDICAID

## 2019-05-07 VITALS
BODY MASS INDEX: 34.07 KG/M2 | WEIGHT: 169 LBS | TEMPERATURE: 98 F | DIASTOLIC BLOOD PRESSURE: 57 MMHG | HEART RATE: 99 BPM | SYSTOLIC BLOOD PRESSURE: 112 MMHG | RESPIRATION RATE: 18 BRPM | HEIGHT: 59 IN

## 2019-05-07 DIAGNOSIS — R42 DIZZINESS: ICD-10-CM

## 2019-05-07 LAB
BASOPHILS # BLD AUTO: 0.03 K/UL (ref 0–0.2)
BASOPHILS NFR BLD: 0.2 % (ref 0–1.9)
DIFFERENTIAL METHOD: ABNORMAL
EOSINOPHIL # BLD AUTO: 0.1 K/UL (ref 0–0.5)
EOSINOPHIL NFR BLD: 0.7 % (ref 0–8)
ERYTHROCYTE [DISTWIDTH] IN BLOOD BY AUTOMATED COUNT: 15.4 % (ref 11.5–14.5)
HCT VFR BLD AUTO: 31.3 % (ref 37–48.5)
HGB BLD-MCNC: 9.8 G/DL (ref 12–16)
LYMPHOCYTES # BLD AUTO: 2.6 K/UL (ref 1–4.8)
LYMPHOCYTES NFR BLD: 16.4 % (ref 18–48)
MCH RBC QN AUTO: 25.2 PG (ref 27–31)
MCHC RBC AUTO-ENTMCNC: 31.3 G/DL (ref 32–36)
MCV RBC AUTO: 81 FL (ref 82–98)
MONOCYTES # BLD AUTO: 1.5 K/UL (ref 0.3–1)
MONOCYTES NFR BLD: 9.7 % (ref 4–15)
NEUTROPHILS # BLD AUTO: 11.4 K/UL (ref 1.8–7.7)
NEUTROPHILS NFR BLD: 73 % (ref 38–73)
PLATELET # BLD AUTO: 283 K/UL (ref 150–350)
PMV BLD AUTO: 9.3 FL (ref 9.2–12.9)
RBC # BLD AUTO: 3.89 M/UL (ref 4–5.4)
WBC # BLD AUTO: 15.55 K/UL (ref 3.9–12.7)

## 2019-05-07 PROCEDURE — 59025 FETAL NON-STRESS TEST: CPT | Mod: 26,S$PBB,, | Performed by: MIDWIFE

## 2019-05-07 PROCEDURE — 59025 FETAL NON-STRESS TEST: CPT

## 2019-05-07 PROCEDURE — 85025 COMPLETE CBC W/AUTO DIFF WBC: CPT

## 2019-05-07 PROCEDURE — 96360 HYDRATION IV INFUSION INIT: CPT

## 2019-05-07 PROCEDURE — 25000003 PHARM REV CODE 250: Performed by: MIDWIFE

## 2019-05-07 PROCEDURE — 99211 OFF/OP EST MAY X REQ PHY/QHP: CPT | Mod: 25

## 2019-05-07 PROCEDURE — 59025 OBTAIN FETAL NONSTRESS TEST (NST): ICD-10-PCS | Mod: 26,S$PBB,, | Performed by: MIDWIFE

## 2019-05-07 PROCEDURE — 99213 PR OFFICE/OUTPT VISIT, EST, LEVL III, 20-29 MIN: ICD-10-PCS | Mod: 25,TH,S$PBB, | Performed by: MIDWIFE

## 2019-05-07 PROCEDURE — 99213 OFFICE O/P EST LOW 20 MIN: CPT | Mod: 25,TH,S$PBB, | Performed by: MIDWIFE

## 2019-05-07 RX ORDER — ONDANSETRON 8 MG/1
8 TABLET, ORALLY DISINTEGRATING ORAL EVERY 8 HOURS PRN
Status: DISCONTINUED | OUTPATIENT
Start: 2019-05-07 | End: 2019-05-07 | Stop reason: HOSPADM

## 2019-05-07 RX ORDER — ACETAMINOPHEN 500 MG
500 TABLET ORAL EVERY 6 HOURS PRN
Status: DISCONTINUED | OUTPATIENT
Start: 2019-05-07 | End: 2019-05-07 | Stop reason: HOSPADM

## 2019-05-07 RX ADMIN — SODIUM CHLORIDE, SODIUM LACTATE, POTASSIUM CHLORIDE, AND CALCIUM CHLORIDE 1000 ML: .6; .31; .03; .02 INJECTION, SOLUTION INTRAVENOUS at 05:05

## 2019-05-07 NOTE — HPI
, 35 2/7 wks gestation, pt c/o dizziness for the last 2 wks, pt was started on zoloft d/t the death of her mother, only took a few doses, pt states she sleeps a lot and doesn't eat much

## 2019-05-08 NOTE — PROCEDURES
"Mayelin Lynn is a 23 y.o. female patient.    Temp: 97.8 °F (36.6 °C) (05/07/19 1604)  Pulse: 99 (05/07/19 1900)  Resp: 20 (05/07/19 1604)  BP: (!) 112/57 (05/07/19 1900)  Weight: 76.7 kg (169 lb) (05/07/19 1615)  Height: 4' 11" (149.9 cm) (05/07/19 1615)       Obtain Fetal nonstress test (NST)  Date/Time: 5/7/2019 7:24 PM  Performed by: Dequan Haynes CNM  Authorized by: Dequan Haynes CNM     Nonstress Test:     Variability:  6-25 BPM    Decelerations:  None    Accelerations:  15 bpm    Contractions:  Irregular  Biophysical Profile:     Nonstress Test Interpretation: reactive      Overall Impression:  Reassuring  Post-procedure:     Patient tolerance:  Patient tolerated the procedure well with no immediate complications        Dequan Haynes  5/7/2019  "

## 2019-05-08 NOTE — DISCHARGE SUMMARY
[facility]  Obstetrics  Discharge Summary      Patient Name: Mayelin Lynn  MRN: [id number]  Admission Date: 2019  Hospital Length of Stay: 0 days  Discharge Date and Time:  2019 7:23 PM  Attending Physician: Caty Mccann MD   Discharging Provider: Dequan Haynes CNM  Primary Care Provider: Primary Doctor No    HPI: , 35 2/7 wks gestation, pt c/o dizziness for the last 2 wks, pt was started on zoloft d/t the death of her mother, only took a few doses, pt states she sleeps a lot and doesn't eat much    * No surgery found *     Hospital Course:   Observe  CEFM/TOCO  NST  IV fluids  CBC  Iron bid        Final Active Diagnoses:    Diagnosis Date Noted POA    PRINCIPAL PROBLEM:  Dizziness [R42] 2019 Yes      Problems Resolved During this Admission:        Labs: All labs within the past 24 hours have been reviewed        Immunizations     None          This patient has no babies on file.  Pending Diagnostic Studies:     None          Discharged Condition: stable    Disposition: Home or Self Care    Follow Up:  Follow-up Information     Please follow up.    Why:  as sched               Patient Instructions:   No discharge procedures on file.  Medications:  Current Discharge Medication List      CONTINUE these medications which have NOT CHANGED    Details   ferrous sulfate (FEOSOL) 325 mg (65 mg iron) Tab tablet Take 1 tablet (325 mg total) by mouth once daily.  Qty: 30 tablet, Refills: 3      prenatal vit,yue 74/iron/folic (PRENATAL VITAMIN 1+1 ORAL) Take by mouth.      sertraline (ZOLOFT) 50 MG tablet Take 1 tablet (50 mg total) by mouth once daily.  Qty: 30 tablet, Refills: 2             Dequan Haynes CNM  Obstetrics  [facility]

## 2019-05-08 NOTE — H&P
[facility]   Obstetrics  History & Physical    Patient Name: Mayelin Lynn  MRN: [id number]  Admission Date: 2019  Primary Care Provider: Primary Doctor No    Subjective:     Principal Problem:Dizziness    History of Present Illness:  , 35 2/7 wks gestation, pt c/o dizziness for the last 2 wks, pt was started on zoloft d/t the death of her mother, only took a few doses, pt states she sleeps a lot and doesn't eat much    Obstetric HPI:  Patient reports dizziness, active fetal movement, No vaginal bleeding , No loss of fluid     This pregnancy has been complicated by anemia, rh negative    OB History    Para Term  AB Living   2 0 0 0 1 0   SAB TAB Ectopic Multiple Live Births   1 0 0 0 0      # Outcome Date GA Lbr Hakan/2nd Weight Sex Delivery Anes PTL Lv   2 Current            1 SAB 14 6w0d            History reviewed. No pertinent past medical history.  Past Surgical History:   Procedure Laterality Date    ORAL ANTRAL FISTULA CLOSURE      TONSILLECTOMY, ADENOIDECTOMY         PTA Medications   Medication Sig    ferrous sulfate (FEOSOL) 325 mg (65 mg iron) Tab tablet Take 1 tablet (325 mg total) by mouth once daily.    prenatal vit,yue 74/iron/folic (PRENATAL VITAMIN 1+1 ORAL) Take by mouth.    sertraline (ZOLOFT) 50 MG tablet Take 1 tablet (50 mg total) by mouth once daily.       Review of patient's allergies indicates:  No Known Allergies     Family History     None        Tobacco Use    Smoking status: Former Smoker     Packs/day: 0.50     Types: Cigarettes    Smokeless tobacco: Never Used   Substance and Sexual Activity    Alcohol use: No    Drug use: No    Sexual activity: Yes     Partners: Male     Birth control/protection: None     Review of Systems   Neurological:        Dizziness   All other systems reviewed and are negative.     Objective:     Vital Signs (Most Recent):  Temp: 97.8 °F (36.6 °C) (19 1604)  Pulse: 99 (19 1900)  Resp: 20 (19  1604)  BP: (!) 112/57 (19 1900) Vital Signs (24h Range):  Temp:  [97.8 °F (36.6 °C)] 97.8 °F (36.6 °C)  Pulse:  [] 99  Resp:  [20] 20  BP: (103-112)/(57-76) 112/57     Weight: 76.7 kg (169 lb)  Body mass index is 34.13 kg/m².    FHT: Cat 1 (reassuring)  TOCO:  irregular    Physical Exam:   Constitutional: She is oriented to person, place, and time. She appears well-developed and well-nourished.    HENT:   Head: Normocephalic.     Neck: Normal range of motion.     Pulmonary/Chest: Effort normal.        Abdominal: Soft.             Musculoskeletal: Normal range of motion and moves all extremeties.       Neurological: She is alert and oriented to person, place, and time.    Skin: Skin is warm and dry.    Psychiatric: She has a normal mood and affect. Her behavior is normal. Judgment and thought content normal.       Cervix:  Dilation:  deferred  Effacement:    Station:   Presentation:      Significant Labs:  Lab Results   Component Value Date    GROUPTRH O NEG 2019    HEPBSAG Negative 2018       I have personallly reviewed all pertinent lab results from the last 24 hours.  Recent Lab Results       19  1745        Baso # 0.03     Basophil% 0.2     Differential Method Automated     Eos # 0.1     Eosinophil% 0.7     Gran # (ANC) 11.4     Gran% 73.0     Hematocrit 31.3     Hemoglobin 9.8     Lymph # 2.6     Lymph% 16.4     MCH 25.2     MCHC 31.3     MCV 81     Mono # 1.5     Mono% 9.7     MPV 9.3     Platelets 283     RBC 3.89     RDW 15.4     WBC 15.55         Assessment/Plan:     23 y.o. female  at 35w2d for:    * Dizziness  Observe  IV fluids  NST  CBC  Iron BID        Dequan Haynes CNM  Obstetrics  [facility]

## 2019-05-08 NOTE — DISCHARGE INSTRUCTIONS

## 2019-05-08 NOTE — SUBJECTIVE & OBJECTIVE
Obstetric HPI:  Patient reports dizziness, active fetal movement, No vaginal bleeding , No loss of fluid     This pregnancy has been complicated by anemia, rh negative    OB History    Para Term  AB Living   2 0 0 0 1 0   SAB TAB Ectopic Multiple Live Births   1 0 0 0 0      # Outcome Date GA Lbr Hakan/2nd Weight Sex Delivery Anes PTL Lv   2 Current            1 SAB 14 6w0d            History reviewed. No pertinent past medical history.  Past Surgical History:   Procedure Laterality Date    ORAL ANTRAL FISTULA CLOSURE      TONSILLECTOMY, ADENOIDECTOMY         PTA Medications   Medication Sig    ferrous sulfate (FEOSOL) 325 mg (65 mg iron) Tab tablet Take 1 tablet (325 mg total) by mouth once daily.    prenatal vit,yue 74/iron/folic (PRENATAL VITAMIN 1+1 ORAL) Take by mouth.    sertraline (ZOLOFT) 50 MG tablet Take 1 tablet (50 mg total) by mouth once daily.       Review of patient's allergies indicates:  No Known Allergies     Family History     None        Tobacco Use    Smoking status: Former Smoker     Packs/day: 0.50     Types: Cigarettes    Smokeless tobacco: Never Used   Substance and Sexual Activity    Alcohol use: No    Drug use: No    Sexual activity: Yes     Partners: Male     Birth control/protection: None     Review of Systems   Neurological:        Dizziness   All other systems reviewed and are negative.     Objective:     Vital Signs (Most Recent):  Temp: 97.8 °F (36.6 °C) (19 1604)  Pulse: 99 (19 1900)  Resp: 20 (19 1604)  BP: (!) 112/57 (19 1900) Vital Signs (24h Range):  Temp:  [97.8 °F (36.6 °C)] 97.8 °F (36.6 °C)  Pulse:  [] 99  Resp:  [20] 20  BP: (103-112)/(57-76) 112/57     Weight: 76.7 kg (169 lb)  Body mass index is 34.13 kg/m².    FHT: Cat 1 (reassuring)  TOCO:  irregular    Physical Exam:   Constitutional: She is oriented to person, place, and time. She appears well-developed and well-nourished.    HENT:   Head: Normocephalic.      Neck: Normal range of motion.     Pulmonary/Chest: Effort normal.        Abdominal: Soft.             Musculoskeletal: Normal range of motion and moves all extremeties.       Neurological: She is alert and oriented to person, place, and time.    Skin: Skin is warm and dry.    Psychiatric: She has a normal mood and affect. Her behavior is normal. Judgment and thought content normal.       Cervix:  Dilation:  deferred  Effacement:    Station:   Presentation:      Significant Labs:  Lab Results   Component Value Date    GROUPTR O NEG 03/21/2019    HEPBSAG Negative 11/08/2018       I have personallly reviewed all pertinent lab results from the last 24 hours.  Recent Lab Results       05/07/19  1745        Baso # 0.03     Basophil% 0.2     Differential Method Automated     Eos # 0.1     Eosinophil% 0.7     Gran # (ANC) 11.4     Gran% 73.0     Hematocrit 31.3     Hemoglobin 9.8     Lymph # 2.6     Lymph% 16.4     MCH 25.2     MCHC 31.3     MCV 81     Mono # 1.5     Mono% 9.7     MPV 9.3     Platelets 283     RBC 3.89     RDW 15.4     WBC 15.55

## 2019-05-08 NOTE — PROGRESS NOTES
Pt discharged to home per own care. Pt is ambulatory and left per private car. Discharge instructions taught by RN. Pt educated to go to new follow-up appointment on 5-17-19. Pt educated on the importance of hydration and a well balanced diet.

## 2019-05-17 ENCOUNTER — ROUTINE PRENATAL (OUTPATIENT)
Dept: OBSTETRICS AND GYNECOLOGY | Facility: CLINIC | Age: 24
End: 2019-05-17
Payer: MEDICAID

## 2019-05-17 VITALS
SYSTOLIC BLOOD PRESSURE: 100 MMHG | WEIGHT: 170.44 LBS | BODY MASS INDEX: 34.42 KG/M2 | DIASTOLIC BLOOD PRESSURE: 56 MMHG

## 2019-05-17 DIAGNOSIS — Z34.83 ENCOUNTER FOR SUPERVISION OF OTHER NORMAL PREGNANCY IN THIRD TRIMESTER: Primary | ICD-10-CM

## 2019-05-17 PROCEDURE — 99999 PR PBB SHADOW E&M-EST. PATIENT-LVL II: ICD-10-PCS | Mod: PBBFAC,,, | Performed by: ADVANCED PRACTICE MIDWIFE

## 2019-05-17 PROCEDURE — 99212 OFFICE O/P EST SF 10 MIN: CPT | Mod: PBBFAC,TH,PO | Performed by: ADVANCED PRACTICE MIDWIFE

## 2019-05-17 PROCEDURE — 87081 CULTURE SCREEN ONLY: CPT

## 2019-05-17 PROCEDURE — 99999 PR PBB SHADOW E&M-EST. PATIENT-LVL II: CPT | Mod: PBBFAC,,, | Performed by: ADVANCED PRACTICE MIDWIFE

## 2019-05-17 PROCEDURE — 99213 OFFICE O/P EST LOW 20 MIN: CPT | Mod: TH,S$PBB,, | Performed by: ADVANCED PRACTICE MIDWIFE

## 2019-05-17 PROCEDURE — 99213 PR OFFICE/OUTPT VISIT, EST, LEVL III, 20-29 MIN: ICD-10-PCS | Mod: TH,S$PBB,, | Performed by: ADVANCED PRACTICE MIDWIFE

## 2019-05-17 NOTE — PROGRESS NOTES
Pt states she was in hospital for dizziness recently. Educated pt on hydration and relief measures.   Denies ctx/LOF/bleeding.   Labor talk today.   Requested SVE.  GBS swabbed.

## 2019-05-20 LAB — BACTERIA SPEC AEROBE CULT: NORMAL

## 2019-05-23 ENCOUNTER — ROUTINE PRENATAL (OUTPATIENT)
Dept: OBSTETRICS AND GYNECOLOGY | Facility: CLINIC | Age: 24
End: 2019-05-23
Payer: MEDICAID

## 2019-05-23 VITALS
SYSTOLIC BLOOD PRESSURE: 110 MMHG | DIASTOLIC BLOOD PRESSURE: 70 MMHG | WEIGHT: 169.75 LBS | BODY MASS INDEX: 34.29 KG/M2

## 2019-05-23 DIAGNOSIS — Z34.83 ENCOUNTER FOR SUPERVISION OF OTHER NORMAL PREGNANCY IN THIRD TRIMESTER: Primary | ICD-10-CM

## 2019-05-23 PROCEDURE — 99999 PR PBB SHADOW E&M-EST. PATIENT-LVL II: CPT | Mod: PBBFAC,,, | Performed by: ADVANCED PRACTICE MIDWIFE

## 2019-05-23 PROCEDURE — 99999 PR PBB SHADOW E&M-EST. PATIENT-LVL II: ICD-10-PCS | Mod: PBBFAC,,, | Performed by: ADVANCED PRACTICE MIDWIFE

## 2019-05-23 PROCEDURE — 99213 OFFICE O/P EST LOW 20 MIN: CPT | Mod: TH,S$PBB,, | Performed by: ADVANCED PRACTICE MIDWIFE

## 2019-05-23 PROCEDURE — 99212 OFFICE O/P EST SF 10 MIN: CPT | Mod: PBBFAC,TH,PO | Performed by: ADVANCED PRACTICE MIDWIFE

## 2019-05-23 PROCEDURE — 99213 PR OFFICE/OUTPT VISIT, EST, LEVL III, 20-29 MIN: ICD-10-PCS | Mod: TH,S$PBB,, | Performed by: ADVANCED PRACTICE MIDWIFE

## 2019-05-23 NOTE — PROGRESS NOTES
Pt reports feeling tired and uncomfortable. Asked about earliest she can be induced. Discussed IOL policy and reasons for not inducing early. Pt verbalized understanding.   Pt teary and states she never got pictures from her ultrasounds. Will check with [facility].   Discussed labor precautions/kick counts.   GBS negative.

## 2019-05-28 ENCOUNTER — ROUTINE PRENATAL (OUTPATIENT)
Dept: OBSTETRICS AND GYNECOLOGY | Facility: CLINIC | Age: 24
End: 2019-05-28
Payer: MEDICAID

## 2019-05-28 VITALS
DIASTOLIC BLOOD PRESSURE: 58 MMHG | BODY MASS INDEX: 34.95 KG/M2 | WEIGHT: 173.06 LBS | SYSTOLIC BLOOD PRESSURE: 100 MMHG

## 2019-05-28 DIAGNOSIS — Z34.83 ENCOUNTER FOR SUPERVISION OF OTHER NORMAL PREGNANCY IN THIRD TRIMESTER: Primary | ICD-10-CM

## 2019-05-28 PROCEDURE — 99213 OFFICE O/P EST LOW 20 MIN: CPT | Mod: TH,S$PBB,, | Performed by: ADVANCED PRACTICE MIDWIFE

## 2019-05-28 PROCEDURE — 99212 OFFICE O/P EST SF 10 MIN: CPT | Mod: PBBFAC,TH,PO | Performed by: ADVANCED PRACTICE MIDWIFE

## 2019-05-28 PROCEDURE — 99999 PR PBB SHADOW E&M-EST. PATIENT-LVL II: CPT | Mod: PBBFAC,,, | Performed by: ADVANCED PRACTICE MIDWIFE

## 2019-05-28 PROCEDURE — 99213 PR OFFICE/OUTPT VISIT, EST, LEVL III, 20-29 MIN: ICD-10-PCS | Mod: TH,S$PBB,, | Performed by: ADVANCED PRACTICE MIDWIFE

## 2019-05-28 PROCEDURE — 99999 PR PBB SHADOW E&M-EST. PATIENT-LVL II: ICD-10-PCS | Mod: PBBFAC,,, | Performed by: ADVANCED PRACTICE MIDWIFE

## 2019-05-28 NOTE — PROGRESS NOTES
Pt reports N/V and heart burn. Discussed relief measures and tums or zantac.   Reports occasional mild contractions and constant pelvic pressure.   Labor talk/kick counts.

## 2019-06-06 ENCOUNTER — ROUTINE PRENATAL (OUTPATIENT)
Dept: OBSTETRICS AND GYNECOLOGY | Facility: CLINIC | Age: 24
End: 2019-06-06
Payer: MEDICAID

## 2019-06-06 VITALS — BODY MASS INDEX: 34.64 KG/M2 | SYSTOLIC BLOOD PRESSURE: 120 MMHG | DIASTOLIC BLOOD PRESSURE: 70 MMHG | WEIGHT: 171.5 LBS

## 2019-06-06 DIAGNOSIS — O99.013 ANEMIA DURING PREGNANCY IN THIRD TRIMESTER: Primary | ICD-10-CM

## 2019-06-06 DIAGNOSIS — O48.0 POST-TERM PREGNANCY, 40-42 WEEKS OF GESTATION: ICD-10-CM

## 2019-06-06 DIAGNOSIS — O26.899 RH NEGATIVE, ANTEPARTUM: ICD-10-CM

## 2019-06-06 DIAGNOSIS — Z67.91 RH NEGATIVE, ANTEPARTUM: ICD-10-CM

## 2019-06-06 DIAGNOSIS — Z3A.39 39 WEEKS GESTATION OF PREGNANCY: ICD-10-CM

## 2019-06-06 PROCEDURE — 99999 PR PBB SHADOW E&M-EST. PATIENT-LVL II: CPT | Mod: PBBFAC,,, | Performed by: MIDWIFE

## 2019-06-06 PROCEDURE — 99212 OFFICE O/P EST SF 10 MIN: CPT | Mod: PBBFAC,TH | Performed by: MIDWIFE

## 2019-06-06 PROCEDURE — 99999 PR PBB SHADOW E&M-EST. PATIENT-LVL II: ICD-10-PCS | Mod: PBBFAC,,, | Performed by: MIDWIFE

## 2019-06-06 PROCEDURE — 99212 OFFICE O/P EST SF 10 MIN: CPT | Mod: TH,S$PBB,, | Performed by: MIDWIFE

## 2019-06-06 PROCEDURE — 99212 PR OFFICE/OUTPT VISIT, EST, LEVL II, 10-19 MIN: ICD-10-PCS | Mod: TH,S$PBB,, | Performed by: MIDWIFE

## 2019-06-06 NOTE — PROGRESS NOTES
24 y.o. female  at 39w4d   Reports + FM, denies VB, LOF or regular CTX  Doing well without concerns   TW lbs   US at [place] for post dates   Reviewed warning signs, normal FKCs, labor precautions and how/when to call.  RTC x 1 wks, call or present sooner prn.

## 2019-06-13 ENCOUNTER — PROCEDURE VISIT (OUTPATIENT)
Dept: OBSTETRICS AND GYNECOLOGY | Facility: CLINIC | Age: 24
End: 2019-06-13
Payer: MEDICAID

## 2019-06-13 ENCOUNTER — ANESTHESIA (OUTPATIENT)
Dept: OBSTETRICS AND GYNECOLOGY | Facility: HOSPITAL | Age: 24
End: 2019-06-13
Payer: MEDICAID

## 2019-06-13 ENCOUNTER — HOSPITAL ENCOUNTER (INPATIENT)
Facility: HOSPITAL | Age: 24
LOS: 3 days | Discharge: HOME | End: 2019-06-16
Attending: OBSTETRICS & GYNECOLOGY | Admitting: OBSTETRICS & GYNECOLOGY
Payer: MEDICAID

## 2019-06-13 ENCOUNTER — ANESTHESIA EVENT (OUTPATIENT)
Dept: OBSTETRICS AND GYNECOLOGY | Facility: HOSPITAL | Age: 24
End: 2019-06-13
Payer: MEDICAID

## 2019-06-13 ENCOUNTER — ROUTINE PRENATAL (OUTPATIENT)
Dept: OBSTETRICS AND GYNECOLOGY | Facility: CLINIC | Age: 24
End: 2019-06-13
Payer: MEDICAID

## 2019-06-13 VITALS
DIASTOLIC BLOOD PRESSURE: 78 MMHG | SYSTOLIC BLOOD PRESSURE: 118 MMHG | BODY MASS INDEX: 35.53 KG/M2 | WEIGHT: 175.94 LBS

## 2019-06-13 DIAGNOSIS — Z37.9 NORMAL LABOR: ICD-10-CM

## 2019-06-13 DIAGNOSIS — O48.0 POST-TERM PREGNANCY, 40-42 WEEKS OF GESTATION: Primary | ICD-10-CM

## 2019-06-13 DIAGNOSIS — O48.0 POST-TERM PREGNANCY, 40-42 WEEKS OF GESTATION: ICD-10-CM

## 2019-06-13 LAB
ABO + RH BLD: NORMAL
BASOPHILS # BLD AUTO: 0.03 K/UL (ref 0–0.2)
BASOPHILS NFR BLD: 0.2 % (ref 0–1.9)
BLD GP AB SCN CELLS X3 SERPL QL: NORMAL
DIFFERENTIAL METHOD: ABNORMAL
EOSINOPHIL # BLD AUTO: 0.1 K/UL (ref 0–0.5)
EOSINOPHIL NFR BLD: 0.7 % (ref 0–8)
ERYTHROCYTE [DISTWIDTH] IN BLOOD BY AUTOMATED COUNT: 17.4 % (ref 11.5–14.5)
HCT VFR BLD AUTO: 36.1 % (ref 37–48.5)
HGB BLD-MCNC: 11.4 G/DL (ref 12–16)
LYMPHOCYTES # BLD AUTO: 2.1 K/UL (ref 1–4.8)
LYMPHOCYTES NFR BLD: 13.9 % (ref 18–48)
MCH RBC QN AUTO: 24.6 PG (ref 27–31)
MCHC RBC AUTO-ENTMCNC: 31.6 G/DL (ref 32–36)
MCV RBC AUTO: 78 FL (ref 82–98)
MONOCYTES # BLD AUTO: 1.1 K/UL (ref 0.3–1)
MONOCYTES NFR BLD: 7.3 % (ref 4–15)
NEUTROPHILS # BLD AUTO: 11.7 K/UL (ref 1.8–7.7)
NEUTROPHILS NFR BLD: 77.9 % (ref 38–73)
PLATELET # BLD AUTO: 305 K/UL (ref 150–350)
PMV BLD AUTO: 9.4 FL (ref 9.2–12.9)
RBC # BLD AUTO: 4.64 M/UL (ref 4–5.4)
WBC # BLD AUTO: 14.98 K/UL (ref 3.9–12.7)

## 2019-06-13 PROCEDURE — 76819 FETAL BIOPHYS PROFIL W/O NST: CPT | Mod: 26,S$PBB,, | Performed by: OBSTETRICS & GYNECOLOGY

## 2019-06-13 PROCEDURE — 76819 PR US, OB, FETAL BIOPHYSICAL, W/O NST: ICD-10-PCS | Mod: 26,S$PBB,, | Performed by: OBSTETRICS & GYNECOLOGY

## 2019-06-13 PROCEDURE — 99213 OFFICE O/P EST LOW 20 MIN: CPT | Mod: TH,S$PBB,, | Performed by: ADVANCED PRACTICE MIDWIFE

## 2019-06-13 PROCEDURE — 99211 OFF/OP EST MAY X REQ PHY/QHP: CPT | Mod: 27,TH

## 2019-06-13 PROCEDURE — 11000001 HC ACUTE MED/SURG PRIVATE ROOM

## 2019-06-13 PROCEDURE — 86850 RBC ANTIBODY SCREEN: CPT

## 2019-06-13 PROCEDURE — 99999 PR PBB SHADOW E&M-EST. PATIENT-LVL II: CPT | Mod: PBBFAC,,, | Performed by: ADVANCED PRACTICE MIDWIFE

## 2019-06-13 PROCEDURE — 76816 PR  US,PREGNANT UTERUS,F/U,TRANSABD APP: ICD-10-PCS | Mod: 26,S$PBB,, | Performed by: OBSTETRICS & GYNECOLOGY

## 2019-06-13 PROCEDURE — 76816 OB US FOLLOW-UP PER FETUS: CPT | Mod: 26,S$PBB,, | Performed by: OBSTETRICS & GYNECOLOGY

## 2019-06-13 PROCEDURE — 99999 PR PBB SHADOW E&M-EST. PATIENT-LVL II: ICD-10-PCS | Mod: PBBFAC,,, | Performed by: ADVANCED PRACTICE MIDWIFE

## 2019-06-13 PROCEDURE — 76816 OB US FOLLOW-UP PER FETUS: CPT | Mod: 59,PBBFAC | Performed by: OBSTETRICS & GYNECOLOGY

## 2019-06-13 PROCEDURE — 85025 COMPLETE CBC W/AUTO DIFF WBC: CPT

## 2019-06-13 PROCEDURE — 99213 PR OFFICE/OUTPT VISIT, EST, LEVL III, 20-29 MIN: ICD-10-PCS | Mod: TH,S$PBB,, | Performed by: ADVANCED PRACTICE MIDWIFE

## 2019-06-13 PROCEDURE — 76819 FETAL BIOPHYS PROFIL W/O NST: CPT | Mod: 59,PBBFAC | Performed by: OBSTETRICS & GYNECOLOGY

## 2019-06-13 PROCEDURE — 72100003 HC LABOR CARE, EA. ADDL. 8 HRS

## 2019-06-13 PROCEDURE — 72100002 HC LABOR CARE, 1ST 8 HOURS

## 2019-06-13 PROCEDURE — 99212 OFFICE O/P EST SF 10 MIN: CPT | Mod: PBBFAC,TH | Performed by: ADVANCED PRACTICE MIDWIFE

## 2019-06-13 RX ORDER — BUTORPHANOL TARTRATE 2 MG/ML
1 INJECTION INTRAMUSCULAR; INTRAVENOUS ONCE
Status: COMPLETED | OUTPATIENT
Start: 2019-06-13 | End: 2019-06-14

## 2019-06-13 RX ORDER — SODIUM CHLORIDE, SODIUM LACTATE, POTASSIUM CHLORIDE, CALCIUM CHLORIDE 600; 310; 30; 20 MG/100ML; MG/100ML; MG/100ML; MG/100ML
INJECTION, SOLUTION INTRAVENOUS CONTINUOUS
Status: DISCONTINUED | OUTPATIENT
Start: 2019-06-13 | End: 2019-06-14

## 2019-06-13 RX ORDER — OXYTOCIN/RINGER'S LACTATE 20/1000 ML
41.7 PLASTIC BAG, INJECTION (ML) INTRAVENOUS CONTINUOUS
Status: ACTIVE | OUTPATIENT
Start: 2019-06-13 | End: 2019-06-13

## 2019-06-13 RX ORDER — SODIUM CHLORIDE 9 MG/ML
INJECTION, SOLUTION INTRAVENOUS
Status: DISCONTINUED | OUTPATIENT
Start: 2019-06-13 | End: 2019-06-14

## 2019-06-13 RX ORDER — ONDANSETRON 8 MG/1
8 TABLET, ORALLY DISINTEGRATING ORAL EVERY 8 HOURS PRN
Status: DISCONTINUED | OUTPATIENT
Start: 2019-06-13 | End: 2019-06-14

## 2019-06-13 RX ORDER — OXYTOCIN/RINGER'S LACTATE 20/1000 ML
333 PLASTIC BAG, INJECTION (ML) INTRAVENOUS CONTINUOUS
Status: ACTIVE | OUTPATIENT
Start: 2019-06-13 | End: 2019-06-13

## 2019-06-13 NOTE — H&P
[facility] -   Obstetrics  History & Physical    Patient Name: Mayelin Lynn  MRN: [id number]  Admission Date: 2019  Primary Care Provider: Primary Doctor No    Subjective:     Principal Problem:Normal labor    History of Present Illness:  , 40w 4d gestation, presents to unit from clinic for eval of labor and possible ROM    Obstetric HPI:  Patient reports contractions , active fetal movement, Yes vaginal bleeding , No loss of fluid     This pregnancy has been complicated by anemia, rh negative status    OB History    Para Term  AB Living   2 0 0 0 1 0   SAB TAB Ectopic Multiple Live Births   1 0 0 0 0      # Outcome Date GA Lbr Hakan/2nd Weight Sex Delivery Anes PTL Lv   2 Current            1 SAB 14 6w0d            No past medical history on file.  Past Surgical History:   Procedure Laterality Date    ORAL ANTRAL FISTULA CLOSURE      TONSILLECTOMY, ADENOIDECTOMY         PTA Medications   Medication Sig    ferrous sulfate (FEOSOL) 325 mg (65 mg iron) Tab tablet Take 1 tablet (325 mg total) by mouth once daily.    prenatal vit,yue 74/iron/folic (PRENATAL VITAMIN 1+1 ORAL) Take by mouth.    sertraline (ZOLOFT) 50 MG tablet Take 1 tablet (50 mg total) by mouth once daily.       Review of patient's allergies indicates:  No Known Allergies     Family History     None        Tobacco Use    Smoking status: Former Smoker     Packs/day: 0.50     Types: Cigarettes    Smokeless tobacco: Never Used   Substance and Sexual Activity    Alcohol use: No    Drug use: No    Sexual activity: Yes     Partners: Male     Birth control/protection: None     Review of Systems   Constitutional: Negative.    HENT: Negative.    Eyes: Negative.    Respiratory: Negative.    Cardiovascular: Negative.    Gastrointestinal: Positive for abdominal pain.   Endocrine: Negative.    Genitourinary: Positive for vaginal discharge.   Musculoskeletal: Positive for back pain.   Integumentary:  Negative.    Neurological: Negative.    Hematological: Negative.    Psychiatric/Behavioral: Negative.    Breast: negative.       Objective:     Vital Signs (Most Recent):  Temp: 97.7 °F (36.5 °C) (19 1130)  Pulse: 105 (19 1140)  Resp: 19 (19 1230)  BP: 125/86 (19 1140) Vital Signs (24h Range):  Temp:  [97.7 °F (36.5 °C)] 97.7 °F (36.5 °C)  Pulse:  [104-105] 105  Resp:  [19-20] 19  BP: (114-125)/(78-86) 125/86     Weight: 79.4 kg (175 lb)  Body mass index is 35.35 kg/m².    FHT: Cat 1 (reassuring)  TOCO:  Q 3-6 minutes    Physical Exam:   Constitutional: She is oriented to person, place, and time. She appears well-developed and well-nourished.    HENT:   Head: Normocephalic.     Neck: Normal range of motion.     Pulmonary/Chest: Effort normal.        Abdominal: Soft.   EFW 8# 5oz     Genitourinary: Vaginal discharge found.   Genitourinary Comments: Bloody show           Musculoskeletal: Normal range of motion and moves all extremeties.       Neurological: She is alert and oriented to person, place, and time.    Skin: Skin is warm and dry.    Psychiatric: She has a normal mood and affect. Her behavior is normal. Judgment and thought content normal.       Cervix:  Dilation:  3  Effacement:  90%  Station: -2  Presentation: Vertex     Significant Labs:  Lab Results   Component Value Date    GROUPTRH O NEG 2019    HEPBSAG Negative 2018    STREPBCULT No Group B Streptococcus isolated 2019       I have personallly reviewed all pertinent lab results from the last 24 hours.  Recent Lab Results     None        Assessment/Plan:     24 y.o. female  at 40w4d for:    * Normal labor  Admitted   Expectant management  Epidural if pt desires        Dequan Haynes CNM  Obstetrics  [facility] -

## 2019-06-13 NOTE — HOSPITAL COURSE
Observed   CEFM/TOCO  Rechecked cervix after 2 hours, mod amt bloody show  Admit for labor management  Epidural placed in the AM  May augment if necessary  14   @ 0825  PPD # 1 doing well, BF, baby B positive, will need rhogam  PPD#2 discharge home  Breastfeeding  Plans OC use

## 2019-06-13 NOTE — PROGRESS NOTES
Pt crying upon entering room, states that she has been connie since this morning when she lost her mucous plug  VE per pt request, BBOW noted, meconium stained fluid with mucous noted, nitrazine negative  Will send to hospital for monitoring and r/o labor, pt aware  CNM on call aware   Ultrasound today with cephalic presentation, anterior placenta, 3VC, MVP 7.0cm, MARGARET 15.4cm, BPP 8/8, EFW 73%, 8lb 5oz, reviewed with pt

## 2019-06-13 NOTE — SUBJECTIVE & OBJECTIVE
Obstetric HPI:  Patient reports contractions , active fetal movement, Yes vaginal bleeding , No loss of fluid     This pregnancy has been complicated by anemia, rh negative status    OB History    Para Term  AB Living   2 0 0 0 1 0   SAB TAB Ectopic Multiple Live Births   1 0 0 0 0      # Outcome Date GA Lbr Hakan/2nd Weight Sex Delivery Anes PTL Lv   2 Current            1 SAB 14 6w0d            No past medical history on file.  Past Surgical History:   Procedure Laterality Date    ORAL ANTRAL FISTULA CLOSURE      TONSILLECTOMY, ADENOIDECTOMY         PTA Medications   Medication Sig    ferrous sulfate (FEOSOL) 325 mg (65 mg iron) Tab tablet Take 1 tablet (325 mg total) by mouth once daily.    prenatal vit,yue 74/iron/folic (PRENATAL VITAMIN 1+1 ORAL) Take by mouth.    sertraline (ZOLOFT) 50 MG tablet Take 1 tablet (50 mg total) by mouth once daily.       Review of patient's allergies indicates:  No Known Allergies     Family History     None        Tobacco Use    Smoking status: Former Smoker     Packs/day: 0.50     Types: Cigarettes    Smokeless tobacco: Never Used   Substance and Sexual Activity    Alcohol use: No    Drug use: No    Sexual activity: Yes     Partners: Male     Birth control/protection: None     Review of Systems   Constitutional: Negative.    HENT: Negative.    Eyes: Negative.    Respiratory: Negative.    Cardiovascular: Negative.    Gastrointestinal: Positive for abdominal pain.   Endocrine: Negative.    Genitourinary: Positive for vaginal discharge.   Musculoskeletal: Positive for back pain.   Integumentary:  Negative.   Neurological: Negative.    Hematological: Negative.    Psychiatric/Behavioral: Negative.    Breast: negative.       Objective:     Vital Signs (Most Recent):  Temp: 97.7 °F (36.5 °C) (19 1130)  Pulse: 105 (19 1140)  Resp: 19 (19 1230)  BP: 125/86 (19 1140) Vital Signs (24h Range):  Temp:  [97.7 °F (36.5 °C)] 97.7 °F (36.5  °C)  Pulse:  [104-105] 105  Resp:  [19-20] 19  BP: (114-125)/(78-86) 125/86     Weight: 79.4 kg (175 lb)  Body mass index is 35.35 kg/m².    FHT: Cat 1 (reassuring)  TOCO:  Q 3-6 minutes    Physical Exam:   Constitutional: She is oriented to person, place, and time. She appears well-developed and well-nourished.    HENT:   Head: Normocephalic.     Neck: Normal range of motion.     Pulmonary/Chest: Effort normal.        Abdominal: Soft.   EFW 8# 5oz     Genitourinary: Vaginal discharge found.   Genitourinary Comments: Bloody show           Musculoskeletal: Normal range of motion and moves all extremeties.       Neurological: She is alert and oriented to person, place, and time.    Skin: Skin is warm and dry.    Psychiatric: She has a normal mood and affect. Her behavior is normal. Judgment and thought content normal.       Cervix:  Dilation:  3  Effacement:  90%  Station: -2  Presentation: Vertex     Significant Labs:  Lab Results   Component Value Date    GROUPTRH O NEG 03/21/2019    HEPBSAG Negative 11/08/2018    STREPBCULT No Group B Streptococcus isolated 05/17/2019       I have personallly reviewed all pertinent lab results from the last 24 hours.  Recent Lab Results     None

## 2019-06-13 NOTE — PROGRESS NOTES
"LABOR NOTE    S:  Pt resting comfortably, no complaints.  Family at bedside and supportive.     O: /78   Pulse 102   Temp 98.1 °F (36.7 °C) (Oral)   Resp 18   Ht 4' 11" (1.499 m)   Wt 79.4 kg (175 lb)   LMP 2018   BMI 35.35 kg/m²     GENERAL: Calm and appropriate affect  NEURO: Alert, oriented, normal speech  ABDOMEN: Nontender, Fundus palpates soft between UC's.  FHT: Baseline 145, moderate BTBV, positive accels, no decels. Cat 1, reassuring.  CTX: q 3-4 minutes  SVE: 4.5/90/-2      ASSESSMENT:   24 y.o.  IUP at 40w4d, FHT reassuring/ Cat 1    Patient Active Problem List   Diagnosis    Rh negative, antepartum    Anemia during pregnancy in third trimester    Dizziness    Post-term pregnancy, 40-42 weeks of gestation    Normal labor         PLAN:  Continue close Maternal/Fetal monitoring  Expectant management  Recheck 2 hours or PRN  Epidural per anesthesia at pt's request        "

## 2019-06-13 NOTE — NURSING
Discussed practices that support optimal maternity care and  feeding such as immediate skin to skin, the magic first hour, the importance of the first feeding, and delaying routine procedures. Discussed feeding choice with mother. Reviewed benefits of breastfeeding and risks of formula feeding. Mother states her intention is to breastfeed.

## 2019-06-14 PROBLEM — Z37.9 NORMAL LABOR: Status: RESOLVED | Noted: 2019-06-13 | Resolved: 2019-06-14

## 2019-06-14 PROBLEM — Z37.9 NORMAL LABOR: Status: ACTIVE | Noted: 2019-06-14

## 2019-06-14 LAB
ABO + RH BLD: NORMAL
FETAL CELL SCN BLD QL ROSETTE: NORMAL
INJECT RH IG VOL PATIENT: NORMAL ML

## 2019-06-14 PROCEDURE — 25000003 PHARM REV CODE 250: Performed by: ADVANCED PRACTICE MIDWIFE

## 2019-06-14 PROCEDURE — 62326 NJX INTERLAMINAR LMBR/SAC: CPT | Performed by: NURSE ANESTHETIST, CERTIFIED REGISTERED

## 2019-06-14 PROCEDURE — 63600519 RHOGAM PHARM REV CODE 636 ALT 250 W HCPCS: Performed by: ADVANCED PRACTICE MIDWIFE

## 2019-06-14 PROCEDURE — 63600175 PHARM REV CODE 636 W HCPCS: Performed by: ADVANCED PRACTICE MIDWIFE

## 2019-06-14 PROCEDURE — 59409 PR OBSTETRICAL CARE,VAG DELIV ONLY: ICD-10-PCS | Mod: GB,,, | Performed by: ADVANCED PRACTICE MIDWIFE

## 2019-06-14 PROCEDURE — 86901 BLOOD TYPING SEROLOGIC RH(D): CPT

## 2019-06-14 PROCEDURE — 11000001 HC ACUTE MED/SURG PRIVATE ROOM

## 2019-06-14 PROCEDURE — 63600175 PHARM REV CODE 636 W HCPCS: Performed by: MIDWIFE

## 2019-06-14 PROCEDURE — 72200004 HC VAGINAL DELIVERY LEVEL I

## 2019-06-14 PROCEDURE — 25000003 PHARM REV CODE 250: Performed by: MIDWIFE

## 2019-06-14 PROCEDURE — 63600175 PHARM REV CODE 636 W HCPCS: Performed by: NURSE ANESTHETIST, CERTIFIED REGISTERED

## 2019-06-14 PROCEDURE — 59409 OBSTETRICAL CARE: CPT | Mod: GB,,, | Performed by: ADVANCED PRACTICE MIDWIFE

## 2019-06-14 PROCEDURE — 51701 INSERT BLADDER CATHETER: CPT

## 2019-06-14 PROCEDURE — 72100003 HC LABOR CARE, EA. ADDL. 8 HRS

## 2019-06-14 PROCEDURE — 85461 HEMOGLOBIN FETAL: CPT

## 2019-06-14 RX ORDER — ROPIVACAINE HYDROCHLORIDE 2 MG/ML
INJECTION, SOLUTION EPIDURAL; INFILTRATION; PERINEURAL CONTINUOUS PRN
Status: DISCONTINUED | OUTPATIENT
Start: 2019-06-14 | End: 2019-06-14

## 2019-06-14 RX ORDER — ROPIVACAINE HYDROCHLORIDE 2 MG/ML
INJECTION, SOLUTION EPIDURAL; INFILTRATION; PERINEURAL
Status: DISCONTINUED | OUTPATIENT
Start: 2019-06-14 | End: 2019-06-14

## 2019-06-14 RX ORDER — DIPHENHYDRAMINE HYDROCHLORIDE 50 MG/ML
25 INJECTION INTRAMUSCULAR; INTRAVENOUS EVERY 4 HOURS PRN
Status: DISCONTINUED | OUTPATIENT
Start: 2019-06-14 | End: 2019-06-16 | Stop reason: HOSPADM

## 2019-06-14 RX ORDER — DIPHENHYDRAMINE HCL 25 MG
25 CAPSULE ORAL EVERY 4 HOURS PRN
Status: DISCONTINUED | OUTPATIENT
Start: 2019-06-14 | End: 2019-06-16 | Stop reason: HOSPADM

## 2019-06-14 RX ORDER — OXYTOCIN/RINGER'S LACTATE 20/1000 ML
41.65 PLASTIC BAG, INJECTION (ML) INTRAVENOUS CONTINUOUS
Status: DISPENSED | OUTPATIENT
Start: 2019-06-14 | End: 2019-06-14

## 2019-06-14 RX ORDER — DOCUSATE SODIUM 100 MG/1
200 CAPSULE, LIQUID FILLED ORAL 2 TIMES DAILY PRN
Status: DISCONTINUED | OUTPATIENT
Start: 2019-06-14 | End: 2019-06-16 | Stop reason: HOSPADM

## 2019-06-14 RX ORDER — HYDROCORTISONE 25 MG/G
CREAM TOPICAL 3 TIMES DAILY PRN
Status: DISCONTINUED | OUTPATIENT
Start: 2019-06-14 | End: 2019-06-16 | Stop reason: HOSPADM

## 2019-06-14 RX ORDER — ACETAMINOPHEN 325 MG/1
650 TABLET ORAL EVERY 6 HOURS PRN
Status: DISCONTINUED | OUTPATIENT
Start: 2019-06-14 | End: 2019-06-16 | Stop reason: HOSPADM

## 2019-06-14 RX ORDER — ONDANSETRON 8 MG/1
8 TABLET, ORALLY DISINTEGRATING ORAL EVERY 8 HOURS PRN
Status: DISCONTINUED | OUTPATIENT
Start: 2019-06-14 | End: 2019-06-16 | Stop reason: HOSPADM

## 2019-06-14 RX ORDER — IBUPROFEN 600 MG/1
600 TABLET ORAL EVERY 6 HOURS
Status: DISCONTINUED | OUTPATIENT
Start: 2019-06-14 | End: 2019-06-16 | Stop reason: HOSPADM

## 2019-06-14 RX ORDER — OXYTOCIN 10 [USP'U]/ML
10 INJECTION, SOLUTION INTRAMUSCULAR; INTRAVENOUS ONCE
Status: COMPLETED | OUTPATIENT
Start: 2019-06-14 | End: 2019-06-14

## 2019-06-14 RX ORDER — MISOPROSTOL 200 UG/1
600 TABLET ORAL
Status: DISCONTINUED | OUTPATIENT
Start: 2019-06-14 | End: 2019-06-14

## 2019-06-14 RX ORDER — HYDROCODONE BITARTRATE AND ACETAMINOPHEN 5; 325 MG/1; MG/1
1 TABLET ORAL EVERY 4 HOURS PRN
Status: DISCONTINUED | OUTPATIENT
Start: 2019-06-14 | End: 2019-06-16 | Stop reason: HOSPADM

## 2019-06-14 RX ORDER — FENTANYL CITRATE 50 UG/ML
INJECTION, SOLUTION INTRAMUSCULAR; INTRAVENOUS
Status: DISCONTINUED | OUTPATIENT
Start: 2019-06-14 | End: 2019-06-14

## 2019-06-14 RX ADMIN — HUMAN RHO(D) IMMUNE GLOBULIN 300 MCG: 300 INJECTION, SOLUTION INTRAMUSCULAR at 10:06

## 2019-06-14 RX ADMIN — ROPIVACAINE HYDROCHLORIDE 12 ML/HR: 2 INJECTION, SOLUTION EPIDURAL; INFILTRATION at 05:06

## 2019-06-14 RX ADMIN — BUTORPHANOL TARTRATE 1 MG: 2 INJECTION, SOLUTION INTRAMUSCULAR; INTRAVENOUS at 01:06

## 2019-06-14 RX ADMIN — ROPIVACAINE HYDROCHLORIDE 10 ML: 2 INJECTION, SOLUTION EPIDURAL; INFILTRATION at 05:06

## 2019-06-14 RX ADMIN — Medication 333 MILLI-UNITS/MIN: at 01:06

## 2019-06-14 RX ADMIN — IBUPROFEN 600 MG: 600 TABLET ORAL at 11:06

## 2019-06-14 RX ADMIN — SODIUM CHLORIDE, SODIUM LACTATE, POTASSIUM CHLORIDE, AND CALCIUM CHLORIDE 1000 ML: .6; .31; .03; .02 INJECTION, SOLUTION INTRAVENOUS at 05:06

## 2019-06-14 RX ADMIN — FENTANYL CITRATE 100 MCG: 50 INJECTION, SOLUTION INTRAMUSCULAR; INTRAVENOUS at 05:06

## 2019-06-14 RX ADMIN — IBUPROFEN 600 MG: 600 TABLET ORAL at 05:06

## 2019-06-14 RX ADMIN — OXYTOCIN 10 UNITS: 10 INJECTION, SOLUTION INTRAMUSCULAR; INTRAVENOUS at 01:06

## 2019-06-14 NOTE — NURSING
Assisted pt to bathroom. Pt voided for 150 mL. Rosalba care instructions given. Pt verbalized and demonstrated understanding.

## 2019-06-14 NOTE — ANESTHESIA PROCEDURE NOTES
CSE    Patient location during procedure: OB  Staffing  Resident/CRNA: Mauricio Puentes CRNA  Performed: resident/CRNA   Preanesthetic Checklist  Completed: patient identified, site marked, surgical consent, pre-op evaluation, timeout performed, IV checked, risks and benefits discussed and monitors and equipment checked  CSE  Patient position: sitting  Prep: Betadine  Patient monitoring: heart rate, cardiac monitor and continuous pulse ox  Approach: midline  Spinal Needle  Needle type: pencil-tip   Needle gauge: 27 G  Needle length: 5 in  Epidural Needle  Injection technique: MARIAH air  Needle type: Tuohy   Needle gauge: 18 G  Needle length: 3.5 in  Needle insertion depth: 5 cm  Location: L3-4  Needle localization: anatomical landmarks  Catheter  Catheter type: end hole  Catheter size: 20 G  Catheter at skin depth: 14 cm  Test dose: lidocaine 1.5% with Epi 1-to-200,000  Additional Documentation: incremental injection, negative aspiration for CSF, negative aspiration for heme, no paresthesia on injection and negative test dose  Assessment  Sensory level: T10

## 2019-06-14 NOTE — ANESTHESIA PREPROCEDURE EVALUATION
06/13/2019  Mayelin Lynn is a 24 y.o., female.    Anesthesia Evaluation    I have reviewed the Patient Summary Reports.    I have reviewed the Nursing Notes.      Review of Systems  Anesthesia Hx:  No problems with previous Anesthesia    Cardiovascular:   Exercise tolerance: good        Physical Exam  General:  Well nourished    Airway/Jaw/Neck:  Airway Findings: General Airway Assessment: Adult       Eyes/Ears/Nose:  EYES/EARS/NOSE FINDINGS: Normal    Chest/Lungs:  Chest/Lungs Findings: Clear to auscultation, Normal Respiratory Rate     Heart/Vascular:  Heart Findings: Rate: Normal  Rhythm: Regular Rhythm  Sounds: Normal        Mental Status:  Mental Status Findings: Normal        Anesthesia Plan  Type of Anesthesia, risks & benefits discussed:  Anesthesia Type:  epidural, spinal  Patient's Preference:   Intra-op Monitoring Plan:   Intra-op Monitoring Plan Comments:   Post Op Pain Control Plan: multimodal analgesia  Post Op Pain Control Plan Comments:   Induction:   IV  Beta Blocker:         Informed Consent: Patient understands risks and agrees with Anesthesia plan.  Questions answered. Anesthesia consent signed with patient.  ASA Score: 2     Day of Surgery Review of History & Physical: I have interviewed and examined the patient. I have reviewed the patient's H&P dated:  There are no significant changes.  H&P update referred to the surgeon.  H&P completed by Anesthesiologist.       Ready For Surgery From Anesthesia Perspective.

## 2019-06-14 NOTE — PLAN OF CARE
Problem: Adult Inpatient Plan of Care  Goal: Plan of Care Review  Outcome: Ongoing (interventions implemented as appropriate)  Pt sleeping after epidural.  Pt 6/90/-3.  Call light within reach, bed in lowest position, wheels locked.  No needs at this time.  Report given to day shift RN

## 2019-06-14 NOTE — PROGRESS NOTES
"LABOR NOTE    S:  Pt hurting, desires an epidural.  Family at bedside and supportive.     O: BP (!) 151/79   Pulse 104   Temp 98.1 °F (36.7 °C) (Oral)   Resp 16   Ht 4' 11" (1.499 m)   Wt 79.4 kg (175 lb)   LMP 2018   Breastfeeding? No   BMI 35.35 kg/m²     GENERAL: Calm and appropriate affect  NEURO: Alert, oriented, normal speech  ABDOMEN: Nontender, Fundus palpates soft between UC's.  FHT: Baseline 135, moderate BTBV, positive accels, no decels. Cat 1, reassuring.  CTX: q 3-5 minutes  SVE: /-2      ASSESSMENT:   24 y.o.  IUP at 40w5d, FHT reassuring/ Cat 1    Patient Active Problem List   Diagnosis    Rh negative, antepartum    Anemia during pregnancy in third trimester    Dizziness    Post-term pregnancy, 40-42 weeks of gestation    Normal labor         PLAN:  Continue close Maternal/Fetal monitoring  Recheck 2 hours or PRN  Epidural per anesthesia at pt's request          "

## 2019-06-14 NOTE — PROGRESS NOTES
"LABOR NOTE    S:  Pt resting comfortably no complaints.  Family at bedside and supportive.     O: /83   Pulse 102   Temp 99.3 °F (37.4 °C) (Axillary)   Resp 20   Ht 4' 11" (1.499 m)   Wt 79.4 kg (175 lb)   LMP 2018   Breastfeeding? No   BMI 35.35 kg/m²     GENERAL: Calm and appropriate affect  NEURO: Alert, oriented, normal speech  ABDOMEN: Nontender, Fundus palpates soft between UC's.  FHT: Baseline 145, moderate BTBV, positive accels, no decels. Intermittent auscultation  CTX: q 4-5 minutes  SVE: /-2      ASSESSMENT:   24 y.o.  IUP at 40w4d, FHT reassuring/ Cat 1    Patient Active Problem List   Diagnosis    Rh negative, antepartum    Anemia during pregnancy in third trimester    Dizziness    Post-term pregnancy, 40-42 weeks of gestation    Normal labor         PLAN:  Continue close Maternal/Fetal monitoring  Recheck 2 hours or PRN  Epidural per anesthesia at pt's request          "

## 2019-06-14 NOTE — SUBJECTIVE & OBJECTIVE
Interval History:  Mayelin SANCHEZ is a 24 y.o.  at 40w5d. She is doing well s/p TERESE placement. Family x 3 at bedside.     Objective:     Vital Signs (Most Recent):  Temp: 99.3 °F (37.4 °C) (19)  Pulse: 92 (19)  Resp: 20 (19)  BP: 111/75 (19) Vital Signs (24h Range):  Temp:  [97.6 °F (36.4 °C)-99.3 °F (37.4 °C)] 99.3 °F (37.4 °C)  Pulse:  [] 92  Resp:  [16-20] 20  BP: (101-151)/(62-86) 111/75     Weight: 79.4 kg (175 lb)  Body mass index is 35.35 kg/m².    FHT: 130 bpm with moderate variability and accelerations, no decelerations, Cat 1 (reassuring)  TOCO:  Q 5 minutes  AROM using amnihook with moderate meconium-stained fluid noted, NICU aware    Cervical Exam:  Dilation:  9.5cm  Effacement:  100%  Station: -1  Presentation: Vertex     Significant Labs:  Lab Results   Component Value Date    GROUPTRH O NEG 2019    HEPBSAG Negative 2018    STREPBCULT No Group B Streptococcus isolated 2019       I have personallly reviewed all pertinent lab results from the last 24 hours.    Physical Exam:   Constitutional: She is oriented to person, place, and time. She appears well-developed and well-nourished.    HENT:   Head: Normocephalic.     Neck: Normal range of motion.    Cardiovascular: Normal rate.     Pulmonary/Chest: Effort normal.        Abdominal: Soft.   Non-Tender     Genitourinary: Vagina normal and uterus normal.           Musculoskeletal: Normal range of motion.       Neurological: She is alert and oriented to person, place, and time.    Skin: Skin is warm and dry.    Psychiatric: She has a normal mood and affect. Her behavior is normal. Judgment and thought content normal.

## 2019-06-14 NOTE — PROGRESS NOTES
[facility] [place]  Obstetrics  Labor Progress Note    Patient Name: Mayelin Lynn  MRN: [id number]  Admission Date: 2019  Hospital Length of Stay: 1 days  Attending Physician: Meagan Deluca MD  Primary Care Provider: Primary Doctor No    Subjective:     Principal Problem:Normal labor    Hospital Course:  Observed   CEFM/TOCO  Rechecked cervix after 2 hours, mod amt bloody show  Admit for labor management  Epidural placed in the AM  May augment if necessary      Interval History:  Mayelin SANCHEZ is a 24 y.o.  at 40w5d. She is doing well s/p TERESE placement. Family x 3 at bedside.     Objective:     Vital Signs (Most Recent):  Temp: 99.3 °F (37.4 °C) (19)  Pulse: 92 (19)  Resp: 20 (19)  BP: 111/75 (19) Vital Signs (24h Range):  Temp:  [97.6 °F (36.4 °C)-99.3 °F (37.4 °C)] 99.3 °F (37.4 °C)  Pulse:  [] 92  Resp:  [16-20] 20  BP: (101-151)/(62-86) 111/75     Weight: 79.4 kg (175 lb)  Body mass index is 35.35 kg/m².    FHT: 130 bpm with moderate variability and accelerations, no decelerations, Cat 1 (reassuring)  TOCO:  Q 5 minutes  AROM using amnihook with moderate meconium-stained fluid noted, NICU aware    Cervical Exam:  Dilation:  9.5cm  Effacement:  100%  Station: -1  Presentation: Vertex     Significant Labs:  Lab Results   Component Value Date    GROUPTRH O NEG 2019    HEPBSAG Negative 2018    STREPBCULT No Group B Streptococcus isolated 2019       I have personallly reviewed all pertinent lab results from the last 24 hours.    Physical Exam:   Constitutional: She is oriented to person, place, and time. She appears well-developed and well-nourished.    HENT:   Head: Normocephalic.     Neck: Normal range of motion.    Cardiovascular: Normal rate.     Pulmonary/Chest: Effort normal.        Abdominal: Soft.   Non-Tender     Genitourinary: Vagina normal and uterus normal.           Musculoskeletal: Normal range of motion.        Neurological: She is alert and oriented to person, place, and time.    Skin: Skin is warm and dry.    Psychiatric: She has a normal mood and affect. Her behavior is normal. Judgment and thought content normal.       Assessment/Plan:     24 y.o. female  at 40w5d for:    * Normal labor  Expectant management  Epidural placed  AROM @ 0825 with meconium stained fluid    Anemia during pregnancy in third trimester  Iron Daily    Rh negative, antepartum  Rhogam given 3/21/19          Sd Serrato CNM  Obstetrics  [facility]

## 2019-06-14 NOTE — LACTATION NOTE
Lactation Rounds:    Lactation packet given and reviewed. Discussed early feeding cues and encouraged mother to feed baby in response to those cues. Encouraged unrestricted feedings rather than timed/amount limits, procedural schedules, or visitation schedules. Reviewed normal feeding expectations of 8 or more feedings per 24 hour period, cues that babies use to signal hunger and satiety, and the importance of physical contact during feeding.   Ongoing education provided including correct positioning and latch, signs of an effective feeding, early feeding cues and baby-led feeds, frequency of feeds including the normality of cluster feeding, hand expression, exclusive breastfeeding for 6 months, as well as when and  how to seek the assistance of a qualified health care professional for concerns related to  feeding. Encouraged mother to call for assistance when desired or when infant is showing signs of hunger, contact number provided, mother verbalizes understanding.  Discussed risks of introducing a pacifier to the breastfeeding  and discouraged use at this time. Discussed the AAP recommendation to avoid the use of pacifiers until 1 month of age for breastfeeding infants. Encouraged mother to offer feeding when feeding cues are present and reviewed how to assess the effectiveness of feedings.  Mother states understanding and verbalized appropriate recall.        19 1500   Maternal Assessment   Left Nipple Symptoms blisters  (per mom )   Maternal Infant Feeding   Maternal Emotional State relaxed

## 2019-06-15 PROBLEM — Z37.9 NORMAL LABOR: Status: RESOLVED | Noted: 2019-06-14 | Resolved: 2019-06-15

## 2019-06-15 PROBLEM — O48.0 POST-TERM PREGNANCY, 40-42 WEEKS OF GESTATION: Status: RESOLVED | Noted: 2019-06-13 | Resolved: 2019-06-15

## 2019-06-15 PROBLEM — R42 DIZZINESS: Status: RESOLVED | Noted: 2019-05-07 | Resolved: 2019-06-15

## 2019-06-15 PROCEDURE — 99231 PR SUBSEQUENT HOSPITAL CARE,LEVL I: ICD-10-PCS | Mod: ,,, | Performed by: ADVANCED PRACTICE MIDWIFE

## 2019-06-15 PROCEDURE — 11000001 HC ACUTE MED/SURG PRIVATE ROOM

## 2019-06-15 PROCEDURE — 99231 SBSQ HOSP IP/OBS SF/LOW 25: CPT | Mod: ,,, | Performed by: ADVANCED PRACTICE MIDWIFE

## 2019-06-15 PROCEDURE — 25000003 PHARM REV CODE 250: Performed by: ADVANCED PRACTICE MIDWIFE

## 2019-06-15 RX ADMIN — IBUPROFEN 600 MG: 600 TABLET ORAL at 06:06

## 2019-06-15 RX ADMIN — IBUPROFEN 600 MG: 600 TABLET ORAL at 05:06

## 2019-06-15 RX ADMIN — IBUPROFEN 600 MG: 600 TABLET ORAL at 12:06

## 2019-06-15 NOTE — SUBJECTIVE & OBJECTIVE
Hospital course: Observed   CEFM/TOCO  Rechecked cervix after 2 hours, mod amt bloody show  Admit for labor management  Epidural placed in the AM  May augment if necessary  14   @ 0825  PPD # 1 doing well, BF, baby B positive, will need rhogam    Interval History:     She is doing well this morning. She is tolerating a regular diet without nausea or vomiting. She is voiding spontaneously. She is ambulating. She has passed flatus, and has not a BM. Vaginal bleeding is mild. She denies fever or chills. Abdominal pain is mild and controlled with oral medications. She is breastfeeding. She desires circumcision for her male baby: not applicable.    Objective:     Vital Signs (Most Recent):  Temp: 97.9 °F (36.6 °C) (06/15/19 0752)  Pulse: 74 (06/15/19 0752)  Resp: 18 (06/15/19 0752)  BP: 105/66 (06/15/19 0752)  SpO2: 100 % (19 1452) Vital Signs (24h Range):  Temp:  [97.7 °F (36.5 °C)-99.3 °F (37.4 °C)] 97.9 °F (36.6 °C)  Pulse:  [] 74  Resp:  [16-20] 18  SpO2:  [100 %] 100 %  BP: (105-126)/(56-75) 105/66     Weight: 79.4 kg (175 lb)  Body mass index is 35.35 kg/m².      Intake/Output Summary (Last 24 hours) at 6/15/2019 1007  Last data filed at 2019 2000  Gross per 24 hour   Intake 1003.43 ml   Output 1500 ml   Net -496.57 ml       Significant Labs:  Lab Results   Component Value Date    GROUPTRH O NEG 2019    HEPBSAG Negative 2018    STREPBCULT No Group B Streptococcus isolated 2019     Recent Labs   Lab 19  1350   HGB 11.4*   HCT 36.1*       I have personallly reviewed all pertinent lab results from the last 24 hours.    Physical Exam:   Constitutional: She is oriented to person, place, and time.        Pulmonary/Chest: Effort normal.        Abdominal: Soft.     Genitourinary: Vagina normal and uterus normal.           Musculoskeletal: Normal range of motion and moves all extremeties.       Neurological: She is alert and oriented to person, place, and time.      Psychiatric: She has a normal mood and affect. Her behavior is normal.

## 2019-06-15 NOTE — DISCHARGE INSTRUCTIONS

## 2019-06-15 NOTE — LACTATION NOTE
"This note was copied from a baby's chart.  Lactation Rounds:    Observed mother latch infant in football hold to the right breast. Mother attempting to place nipple directly in infant mouths and infants chin touching chest with nose pressed into breast. Educated mother on proper positioning and assisted her with position change. Discussed the importance of a deep latch, deep latch technique and referred mother to the [employer] video "attaching your baby to the breast." Infant able to obtain an asymmetric latch with audible swallows heard. Infant remains feeding at the breast.      Infants weight loss wnl. Infants intake and output wnl. Reinforced infant feeding & output pattern, cue based feeds & unrestricted access to the breast. Hand expression reviewed. Mother denies any further needs or concerns at this time. Mother verbalizes understanding of education. Discussed early feeding cues and encouraged mother to feed baby in response to those cues. Encouraged unrestricted feedings rather than timed/amount limits, procedural schedules, or visitation schedules. Reviewed normal feeding expectations of 8 or more feedings per 24 hour period, cues that babies use to signal hunger and satiety, and the importance of physical contact during feeding.      06/15/19 0920   Maternal Assessment   Breast Shape Bilateral:;round   Breast Density Bilateral:;soft   Areola Bilateral:;elastic   Nipples Bilateral:;everted   Maternal Infant Feeding   Maternal Emotional State independent;relaxed   Infant Positioning clutch/football   Signs of Milk Transfer audible swallow;infant jaw motion present   Pain with Feeding no   Nipple Shape After Feeding, Right wnl         "

## 2019-06-15 NOTE — PROGRESS NOTES
[facility] -   Obstetrics  Postpartum Progress Note    Patient Name: Mayelin Lynn  MRN: [id number]  Admission Date: 2019  Hospital Length of Stay: 2 days  Attending Physician: Meagan Deluca MD  Primary Care Provider: Primary Doctor No    Subjective:     Principal Problem: (normal spontaneous vaginal delivery)    Hospital course: Observed   CEFM/TOCO  Rechecked cervix after 2 hours, mod amt bloody show  Admit for labor management  Epidural placed in the AM  May augment if necessary  14   @ 0825  PPD # 1 doing well, BF, baby B positive, will need rhogam    Interval History:     She is doing well this morning. She is tolerating a regular diet without nausea or vomiting. She is voiding spontaneously. She is ambulating. She has passed flatus, and has not a BM. Vaginal bleeding is mild. She denies fever or chills. Abdominal pain is mild and controlled with oral medications. She is breastfeeding. She desires circumcision for her male baby: not applicable.    Objective:     Vital Signs (Most Recent):  Temp: 97.9 °F (36.6 °C) (06/15/19 0752)  Pulse: 74 (06/15/19 0752)  Resp: 18 (06/15/19 0752)  BP: 105/66 (06/15/19 0752)  SpO2: 100 % (19 1452) Vital Signs (24h Range):  Temp:  [97.7 °F (36.5 °C)-99.3 °F (37.4 °C)] 97.9 °F (36.6 °C)  Pulse:  [] 74  Resp:  [16-20] 18  SpO2:  [100 %] 100 %  BP: (105-126)/(56-75) 105/66     Weight: 79.4 kg (175 lb)  Body mass index is 35.35 kg/m².      Intake/Output Summary (Last 24 hours) at 6/15/2019 1007  Last data filed at 2019 2000  Gross per 24 hour   Intake 1003.43 ml   Output 1500 ml   Net -496.57 ml       Significant Labs:  Lab Results   Component Value Date    GROUPTRH O NEG 2019    HEPBSAG Negative 2018    STREPBCULT No Group B Streptococcus isolated 2019     Recent Labs   Lab 19  1350   HGB 11.4*   HCT 36.1*       I have personallly reviewed all pertinent lab results from the last 24 hours.    Physical Exam:    Constitutional: She is oriented to person, place, and time.        Pulmonary/Chest: Effort normal.        Abdominal: Soft.     Genitourinary: Vagina normal and uterus normal.           Musculoskeletal: Normal range of motion and moves all extremeties.       Neurological: She is alert and oriented to person, place, and time.     Psychiatric: She has a normal mood and affect. Her behavior is normal.       Assessment/Plan:     24 y.o. female  for:    Anemia during pregnancy in third trimester  Iron Daily    Rh negative, antepartum  Rhogam given 3/21/19        Disposition: As patient meets milestones, will plan to discharge 6/16/15.    Carmelita Delgado CNM  Obstetrics  [facility]

## 2019-06-15 NOTE — PLAN OF CARE
Problem: Adult Inpatient Plan of Care  Goal: Plan of Care Review  Outcome: Ongoing (interventions implemented as appropriate)  Patient progressing well.  Ambulating and voiding without difficulty.  Bonding well with .  Pain well controlled with po pain medication.  Vital signs stable.

## 2019-06-15 NOTE — ANESTHESIA POSTPROCEDURE EVALUATION
Anesthesia Post Evaluation    Patient: Mayelin Lynn    Procedure(s) Performed: * No procedures listed *    Final Anesthesia Type: epidural  Patient location during evaluation: labor & delivery  Patient participation: Yes- Able to Participate  Level of consciousness: awake and alert  Post-procedure vital signs: reviewed and stable  Pain management: adequate  Airway patency: patent  PONV status at discharge: No PONV  Anesthetic complications: no      Cardiovascular status: hemodynamically stable  Respiratory status: spontaneous ventilation and room air  Hydration status: euvolemic  Follow-up not needed.          Vitals Value Taken Time   /68 6/14/2019  4:50 PM   Temp 36.9 °C (98.5 °F) 6/14/2019  4:50 PM   Pulse 104 6/14/2019  4:50 PM   Resp 18 6/14/2019  4:50 PM   SpO2 99 % 6/14/2019  4:02 PM   Vitals shown include unvalidated device data.      No case tracking events are documented in the log.      Pain/Pio Score: Pain Rating Prior to Med Admin: 0 (6/14/2019  5:12 PM)  Pain Rating Post Med Admin: 0 (6/14/2019  6:00 PM)  Pio Score: 10 (6/14/2019  3:20 PM)

## 2019-06-15 NOTE — TRANSFER OF CARE
"Anesthesia Transfer of Care Note    Patient: Mayelin Lynn    Procedure(s) Performed: * No procedures listed *    Patient location: Labor and Delivery    Anesthesia Type: epidural    Post pain: adequate analgesia    Post assessment: no apparent anesthetic complications and tolerated procedure well    Level of consciousness: awake, alert and oriented    Nausea/Vomiting: no nausea/vomiting    Complications: none    Transfer of care protocol was followed      Last vitals:   Visit Vitals  /68   Pulse 104   Temp 36.9 °C (98.5 °F) (Oral)   Resp 18   Ht 4' 11" (1.499 m)   Wt 79.4 kg (175 lb)   LMP 08/26/2018   SpO2 100%   Breastfeeding? Unknown   BMI 35.35 kg/m²     "

## 2019-06-16 VITALS
DIASTOLIC BLOOD PRESSURE: 59 MMHG | OXYGEN SATURATION: 100 % | TEMPERATURE: 98 F | HEART RATE: 81 BPM | HEIGHT: 59 IN | SYSTOLIC BLOOD PRESSURE: 114 MMHG | BODY MASS INDEX: 35.28 KG/M2 | WEIGHT: 175 LBS | RESPIRATION RATE: 18 BRPM

## 2019-06-16 PROCEDURE — 99238 HOSP IP/OBS DSCHRG MGMT 30/<: CPT | Mod: ,,, | Performed by: ADVANCED PRACTICE MIDWIFE

## 2019-06-16 PROCEDURE — 25000003 PHARM REV CODE 250: Performed by: ADVANCED PRACTICE MIDWIFE

## 2019-06-16 PROCEDURE — 99238 PR HOSPITAL DISCHARGE DAY,<30 MIN: ICD-10-PCS | Mod: ,,, | Performed by: ADVANCED PRACTICE MIDWIFE

## 2019-06-16 RX ADMIN — IBUPROFEN 600 MG: 600 TABLET ORAL at 06:06

## 2019-06-16 RX ADMIN — IBUPROFEN 600 MG: 600 TABLET ORAL at 12:06

## 2019-06-16 NOTE — DISCHARGE SUMMARY
[facility] -   Obstetrics  Discharge Summary      Patient Name: Mayelin Lynn  MRN: [id number]  Admission Date: 2019  Hospital Length of Stay: 3 days  Discharge Date and Time:  2019 2:44 PM  Attending Physician: No att. providers found   Discharging Provider: Eden Can CNM   Primary Care Provider: Primary Doctor Isaura    HPI: , 40w 4d gestation, presents to unit from clinic for eval of labor and possible ROM        * No surgery found *     Hospital Course:   Observed   CEFM/TOCO  Rechecked cervix after 2 hours, mod amt bloody show  Admit for labor management  Epidural placed in the AM  May augment if necessary  14   @ 0825  PPD # 1 doing well, BF, baby B positive, will need rhogam  PPD#2 discharge home  Breastfeeding  Plans OC use         Final Active Diagnoses:    Diagnosis Date Noted POA    PRINCIPAL PROBLEM:   (normal spontaneous vaginal delivery) [O80] 2019 Not Applicable    Single liveborn [Z38.2] 2019 No    Obstetric labial laceration, delivered, current hospitalization [O70.0] 2019 No    Anemia during pregnancy in third trimester [O99.013] 2019 Yes    Rh negative, antepartum [O09.899, Z67.91] 2018 Not Applicable      Problems Resolved During this Admission:    Diagnosis Date Noted Date Resolved POA    Normal labor [O80, Z37.9] 2019 06/15/2019 Not Applicable    Normal labor [O80, Z37.9] 2019 Not Applicable          Feeding Method: breast    Immunizations     Date Immunization Status Dose Route/Site Given by    196 Rho (D) Immune Globulin - IM Given 300 mcg Intramuscular/Left upper quad. gluteus Urmila Spears LPN    19 1020 MMR Deferred (Other) 0.5 mL Subcutaneous/Left deltoid Nakia Hanson RN    19 1020 Tdap Deferred 0.5 mL Intramuscular/Left deltoid Nakia Hanson RN    19 1020 Rho (D) Immune Globulin - IM Deferred (Other) 300 mcg Intramuscular/ Nakia Hanson RN     06/16/19 1021 Rho (D) Immune Globulin - IM Deferred (Other) 300 mcg Intramuscular/ Nakia Hanson RN          Delivery:    Episiotomy: None   Lacerations: None   Repair suture:     Repair # of packets: 1   Blood loss (ml):       Birth information:  YOB: 2018   Time of birth: 1:01 PM   Sex: female   Delivery type: Vaginal, Spontaneous   Gestational Age: 40w5d    Delivery Clinician:      Other providers:       Additional  information:  Forceps:    Vacuum:    Breech:    Observed anomalies      Living?:           APGARS  One minute Five minutes Ten minutes   Skin color:         Heart rate:         Grimace:         Muscle tone:         Breathing:         Totals: 8  9        Placenta: Delivered:       appearance    Pending Diagnostic Studies:     None          Discharged Condition: good    Disposition: Home or Self Care    Follow Up:  Follow-up Information     Follow up In 4 weeks.               Patient Instructions:      Activity as tolerated     Medications:  Discharge Medication List as of 6/16/2019 10:21 AM      CONTINUE these medications which have NOT CHANGED    Details   ferrous sulfate (FEOSOL) 325 mg (65 mg iron) Tab tablet Take 1 tablet (325 mg total) by mouth once daily., Starting Thu 4/4/2019, Until Fri 4/3/2020, Normal      prenatal vit,yue 74/iron/folic (PRENATAL VITAMIN 1+1 ORAL) Take by mouth., Historical Med      sertraline (ZOLOFT) 50 MG tablet Take 1 tablet (50 mg total) by mouth once daily., Starting Tue 4/16/2019, Until Wed 4/15/2020, Normal             Eden Can CNM  Obstetrics  [facility]

## 2019-06-16 NOTE — PROGRESS NOTES
[facility] -   Obstetrics  Postpartum Progress Note    Patient Name: Mayelin Lynn  MRN: [id number]  Admission Date: 2019  Hospital Length of Stay: 3 days  Attending Physician: Meagan Deluca MD  Primary Care Provider: Primary Doctor No    Subjective:     Principal Problem: (normal spontaneous vaginal delivery)    Hospital course: Observed   CEFM/TOCO  Rechecked cervix after 2 hours, mod amt bloody show  Admit for labor management  Epidural placed in the AM  May augment if necessary  14   @ 0825  PPD # 1 doing well, BF, baby B positive, will need rhogam  PPD#2 discharge home  Breastfeeding  Plans OC use    Interval History:     She is doing well this morning. She is tolerating a regular diet without nausea or vomiting. She is voiding spontaneously. She is ambulating. She has passed flatus, and has a BM. Vaginal bleeding is mild. She denies fever or chills. Abdominal pain is mild and controlled with oral medications. She is breastfeeding. She desires circumcision for her male baby: not applicable.    Objective:     Vital Signs (Most Recent):  Temp: 98.2 °F (36.8 °C) (19 0000)  Pulse: 79 (19 0000)  Resp: 18 (19 0000)  BP: 110/67 (19 0000)  SpO2: 100 % (19 1452) Vital Signs (24h Range):  Temp:  [98.1 °F (36.7 °C)-98.2 °F (36.8 °C)] 98.2 °F (36.8 °C)  Pulse:  [78-85] 79  Resp:  [18] 18  BP: (108-111)/(62-67) 110/67     Weight: 79.4 kg (175 lb)  Body mass index is 35.35 kg/m².    No intake or output data in the 24 hours ending 19 0910    Significant Labs:  Lab Results   Component Value Date    GROUPTRH O NEG 2019    HEPBSAG Negative 2018    STREPBCULT No Group B Streptococcus isolated 2019     No results for input(s): HGB, HCT in the last 48 hours.    I have personallly reviewed all pertinent lab results from the last 24 hours.    Physical Exam:   Constitutional: She is oriented to person, place, and time. She appears well-developed  and well-nourished.     Eyes: Pupils are equal, round, and reactive to light. Conjunctivae are normal.    Neck: Normal range of motion.    Cardiovascular: Normal rate and regular rhythm.     Pulmonary/Chest: Breath sounds normal.        Abdominal: Soft.     Genitourinary: Vagina normal and uterus normal.           Musculoskeletal: Normal range of motion and moves all extremeties.       Neurological: She is alert and oriented to person, place, and time.    Skin: Skin is warm.    Psychiatric: She has a normal mood and affect. Her behavior is normal. Thought content normal.       Assessment/Plan:     24 y.o. female  for:    *  (normal spontaneous vaginal delivery)  19 routine pp care   Breastfeeding  Plans OC use  Discharge home    Anemia during pregnancy in third trimester  Iron Daily    Rh negative, antepartum  Rhogam given 3/21/19        Disposition: As patient meets milestones, will plan to discharge today    Eden Can CNM  Obstetrics  [facility]

## 2019-06-16 NOTE — LACTATION NOTE
This note was copied from a baby's chart.  Lactation Rounds:     Visited patient at bedside. She was nursing her baby in right cross-cradle hold. Infant's body was rolled away from mother; gape was wide and suckling pattern was rhythmic. Patient describes intense initial latch on pain that subsides to painless feeding. She is experiencing more tenderness on her left nipple. Discussed nipple care and hand expression on this side until mother feels that she is able to latch her baby to left breast again. Mother reports hearing swallows with feedings. Discussed signs of milk transfer and deep latch. Infant released right nipple, which was WNL after feeding. Infant displayed signs of satiety.    Discharge teaching done with mother, including expectations for feeding and output, engorgement/mastitis, diet/medications ([facility]), support groups/warmline, etc. First Alert form was reviewed, and mother was strongly encouraged to call warmline if nipple pain is persistent or with any other concerns about breastfeeding. Mother stated that Chemo's father is also interested in feeding baby with a bottle of expressed milk. Discussed appropriate timing of pumping and artificial nipple use introduction at patient's request. Discussed additional bonding techniques for both parents. Patient has a Medela pump at home and states that she will have breastfeeding support from her sister. Additional support, including lactation warmline, discussed. Mother verbalized her understanding of education.       06/16/19 1000   LATCH Score   Latch 2-->grasps breast, tongue down, lips flanged, rhythmic sucking   Audible Swallowing 2-->spontaneous and intermittent (24 hrs old)   Type of Nipple 2-->everted (after stimulation)   Comfort (Breast/Nipple) 1-->filling, red/small blisters/bruises, mild/mod discomfort   Hold (Positioning) 2-->no assist from staff, mother able to position/hold infant   Score 9

## 2019-06-16 NOTE — SUBJECTIVE & OBJECTIVE
Hospital course: Observed   CEFM/TOCO  Rechecked cervix after 2 hours, mod amt bloody show  Admit for labor management  Epidural placed in the AM  May augment if necessary  14   @ 0825  PPD # 1 doing well, BF, baby B positive, will need rhogam  PPD#2 discharge home  Breastfeeding  Plans OC use    Interval History:     She is doing well this morning. She is tolerating a regular diet without nausea or vomiting. She is voiding spontaneously. She is ambulating. She has passed flatus, and has a BM. Vaginal bleeding is mild. She denies fever or chills. Abdominal pain is mild and controlled with oral medications. She is breastfeeding. She desires circumcision for her male baby: not applicable.    Objective:     Vital Signs (Most Recent):  Temp: 98.2 °F (36.8 °C) (19 0000)  Pulse: 79 (19 0000)  Resp: 18 (19 0000)  BP: 110/67 (19 0000)  SpO2: 100 % (19 1452) Vital Signs (24h Range):  Temp:  [98.1 °F (36.7 °C)-98.2 °F (36.8 °C)] 98.2 °F (36.8 °C)  Pulse:  [78-85] 79  Resp:  [18] 18  BP: (108-111)/(62-67) 110/67     Weight: 79.4 kg (175 lb)  Body mass index is 35.35 kg/m².    No intake or output data in the 24 hours ending 19 0910    Significant Labs:  Lab Results   Component Value Date    GROUPTRH O NEG 2019    HEPBSAG Negative 2018    STREPBCULT No Group B Streptococcus isolated 2019     No results for input(s): HGB, HCT in the last 48 hours.    I have personallly reviewed all pertinent lab results from the last 24 hours.    Physical Exam:   Constitutional: She is oriented to person, place, and time. She appears well-developed and well-nourished.     Eyes: Pupils are equal, round, and reactive to light. Conjunctivae are normal.    Neck: Normal range of motion.    Cardiovascular: Normal rate and regular rhythm.     Pulmonary/Chest: Breath sounds normal.        Abdominal: Soft.     Genitourinary: Vagina normal and uterus normal.           Musculoskeletal: Normal  range of motion and moves all extremeties.       Neurological: She is alert and oriented to person, place, and time.    Skin: Skin is warm.    Psychiatric: She has a normal mood and affect. Her behavior is normal. Thought content normal.

## 2019-06-22 ENCOUNTER — PATIENT MESSAGE (OUTPATIENT)
Dept: ADMINISTRATIVE | Facility: OTHER | Age: 24
End: 2019-06-22

## 2019-10-01 ENCOUNTER — TELEPHONE (OUTPATIENT)
Dept: OBSTETRICS AND GYNECOLOGY | Facility: CLINIC | Age: 24
End: 2019-10-01

## 2021-05-18 ENCOUNTER — LAB VISIT (OUTPATIENT)
Dept: LAB | Facility: HOSPITAL | Age: 26
End: 2021-05-18
Attending: ADVANCED PRACTICE MIDWIFE
Payer: OTHER GOVERNMENT

## 2021-05-18 ENCOUNTER — OFFICE VISIT (OUTPATIENT)
Dept: OBSTETRICS AND GYNECOLOGY | Facility: CLINIC | Age: 26
End: 2021-05-18
Payer: OTHER GOVERNMENT

## 2021-05-18 VITALS
WEIGHT: 129.31 LBS | DIASTOLIC BLOOD PRESSURE: 64 MMHG | BODY MASS INDEX: 26.07 KG/M2 | HEIGHT: 59 IN | SYSTOLIC BLOOD PRESSURE: 108 MMHG

## 2021-05-18 DIAGNOSIS — Z32.01 PREGNANCY EXAMINATION OR TEST, POSITIVE RESULT: Primary | ICD-10-CM

## 2021-05-18 DIAGNOSIS — Z32.01 PREGNANCY EXAMINATION OR TEST, POSITIVE RESULT: ICD-10-CM

## 2021-05-18 PROBLEM — O99.013 ANEMIA DURING PREGNANCY IN THIRD TRIMESTER: Status: RESOLVED | Noted: 2019-04-04 | Resolved: 2021-05-18

## 2021-05-18 LAB
BASOPHILS # BLD AUTO: 0.08 K/UL (ref 0–0.2)
BASOPHILS NFR BLD: 1.1 % (ref 0–1.9)
DIFFERENTIAL METHOD: NORMAL
EOSINOPHIL # BLD AUTO: 0.1 K/UL (ref 0–0.5)
EOSINOPHIL NFR BLD: 1.1 % (ref 0–8)
ERYTHROCYTE [DISTWIDTH] IN BLOOD BY AUTOMATED COUNT: 13.3 % (ref 11.5–14.5)
HCT VFR BLD AUTO: 43.6 % (ref 37–48.5)
HGB BLD-MCNC: 14.1 G/DL (ref 12–16)
IMM GRANULOCYTES # BLD AUTO: 0.02 K/UL (ref 0–0.04)
IMM GRANULOCYTES NFR BLD AUTO: 0.3 % (ref 0–0.5)
LYMPHOCYTES # BLD AUTO: 2 K/UL (ref 1–4.8)
LYMPHOCYTES NFR BLD: 26.7 % (ref 18–48)
MCH RBC QN AUTO: 27.3 PG (ref 27–31)
MCHC RBC AUTO-ENTMCNC: 32.3 G/DL (ref 32–36)
MCV RBC AUTO: 84 FL (ref 82–98)
MONOCYTES # BLD AUTO: 0.4 K/UL (ref 0.3–1)
MONOCYTES NFR BLD: 5.6 % (ref 4–15)
NEUTROPHILS # BLD AUTO: 4.9 K/UL (ref 1.8–7.7)
NEUTROPHILS NFR BLD: 65.2 % (ref 38–73)
NRBC BLD-RTO: 0 /100 WBC
PLATELET # BLD AUTO: 286 K/UL (ref 150–450)
PMV BLD AUTO: 9.8 FL (ref 9.2–12.9)
RBC # BLD AUTO: 5.17 M/UL (ref 4–5.4)
WBC # BLD AUTO: 7.46 K/UL (ref 3.9–12.7)

## 2021-05-18 PROCEDURE — 99213 PR OFFICE/OUTPT VISIT, EST, LEVL III, 20-29 MIN: ICD-10-PCS | Mod: S$PBB,,, | Performed by: ADVANCED PRACTICE MIDWIFE

## 2021-05-18 PROCEDURE — 87491 CHLMYD TRACH DNA AMP PROBE: CPT | Performed by: ADVANCED PRACTICE MIDWIFE

## 2021-05-18 PROCEDURE — 87591 N.GONORRHOEAE DNA AMP PROB: CPT | Performed by: ADVANCED PRACTICE MIDWIFE

## 2021-05-18 PROCEDURE — 87086 URINE CULTURE/COLONY COUNT: CPT | Performed by: ADVANCED PRACTICE MIDWIFE

## 2021-05-18 PROCEDURE — 99212 OFFICE O/P EST SF 10 MIN: CPT | Mod: PBBFAC,PO | Performed by: ADVANCED PRACTICE MIDWIFE

## 2021-05-18 PROCEDURE — 86900 BLOOD TYPING SEROLOGIC ABO: CPT | Performed by: ADVANCED PRACTICE MIDWIFE

## 2021-05-18 PROCEDURE — 99999 PR PBB SHADOW E&M-EST. PATIENT-LVL II: CPT | Mod: PBBFAC,,, | Performed by: ADVANCED PRACTICE MIDWIFE

## 2021-05-18 PROCEDURE — 80053 COMPREHEN METABOLIC PANEL: CPT | Performed by: ADVANCED PRACTICE MIDWIFE

## 2021-05-18 PROCEDURE — 80074 ACUTE HEPATITIS PANEL: CPT | Performed by: ADVANCED PRACTICE MIDWIFE

## 2021-05-18 PROCEDURE — 86703 HIV-1/HIV-2 1 RESULT ANTBDY: CPT | Performed by: ADVANCED PRACTICE MIDWIFE

## 2021-05-18 PROCEDURE — 99213 OFFICE O/P EST LOW 20 MIN: CPT | Mod: S$PBB,,, | Performed by: ADVANCED PRACTICE MIDWIFE

## 2021-05-18 PROCEDURE — 85025 COMPLETE CBC W/AUTO DIFF WBC: CPT | Performed by: ADVANCED PRACTICE MIDWIFE

## 2021-05-18 PROCEDURE — 86592 SYPHILIS TEST NON-TREP QUAL: CPT | Performed by: ADVANCED PRACTICE MIDWIFE

## 2021-05-18 PROCEDURE — 99999 PR PBB SHADOW E&M-EST. PATIENT-LVL II: ICD-10-PCS | Mod: PBBFAC,,, | Performed by: ADVANCED PRACTICE MIDWIFE

## 2021-05-18 PROCEDURE — 86762 RUBELLA ANTIBODY: CPT | Performed by: ADVANCED PRACTICE MIDWIFE

## 2021-05-19 LAB
ABO + RH BLD: NORMAL
ALBUMIN SERPL BCP-MCNC: 4.2 G/DL (ref 3.5–5.2)
ALP SERPL-CCNC: 77 U/L (ref 55–135)
ALT SERPL W/O P-5'-P-CCNC: 13 U/L (ref 10–44)
ANION GAP SERPL CALC-SCNC: 10 MMOL/L (ref 8–16)
AST SERPL-CCNC: 18 U/L (ref 10–40)
BACTERIA UR CULT: NO GROWTH
BILIRUB SERPL-MCNC: 0.7 MG/DL (ref 0.1–1)
BLD GP AB SCN CELLS X3 SERPL QL: NORMAL
BUN SERPL-MCNC: 8 MG/DL (ref 6–20)
C TRACH DNA SPEC QL NAA+PROBE: NOT DETECTED
CALCIUM SERPL-MCNC: 9.4 MG/DL (ref 8.7–10.5)
CHLORIDE SERPL-SCNC: 104 MMOL/L (ref 95–110)
CO2 SERPL-SCNC: 22 MMOL/L (ref 23–29)
CREAT SERPL-MCNC: 0.8 MG/DL (ref 0.5–1.4)
EST. GFR  (AFRICAN AMERICAN): >60 ML/MIN/1.73 M^2
EST. GFR  (NON AFRICAN AMERICAN): >60 ML/MIN/1.73 M^2
GLUCOSE SERPL-MCNC: 84 MG/DL (ref 70–110)
HAV IGM SERPL QL IA: NEGATIVE
HBV CORE IGM SERPL QL IA: NEGATIVE
HBV SURFACE AG SERPL QL IA: NEGATIVE
HCV AB SERPL QL IA: NEGATIVE
HIV 1+2 AB+HIV1 P24 AG SERPL QL IA: NEGATIVE
N GONORRHOEA DNA SPEC QL NAA+PROBE: NOT DETECTED
POTASSIUM SERPL-SCNC: 3.9 MMOL/L (ref 3.5–5.1)
PROT SERPL-MCNC: 7.9 G/DL (ref 6–8.4)
RPR SER QL: NORMAL
RUBV IGG SER-ACNC: 20.6 IU/ML
RUBV IGG SER-IMP: REACTIVE
SODIUM SERPL-SCNC: 136 MMOL/L (ref 136–145)

## 2021-06-07 ENCOUNTER — INITIAL PRENATAL (OUTPATIENT)
Dept: OBSTETRICS AND GYNECOLOGY | Facility: CLINIC | Age: 26
End: 2021-06-07
Payer: OTHER GOVERNMENT

## 2021-06-07 VITALS
DIASTOLIC BLOOD PRESSURE: 70 MMHG | WEIGHT: 125.56 LBS | BODY MASS INDEX: 25.36 KG/M2 | SYSTOLIC BLOOD PRESSURE: 104 MMHG

## 2021-06-07 DIAGNOSIS — Z32.01 PREGNANCY EXAMINATION OR TEST, POSITIVE RESULT: ICD-10-CM

## 2021-06-07 DIAGNOSIS — Z3A.01 7 WEEKS GESTATION OF PREGNANCY: ICD-10-CM

## 2021-06-07 PROCEDURE — 99999 PR PBB SHADOW E&M-EST. PATIENT-LVL II: CPT | Mod: PBBFAC,,, | Performed by: ADVANCED PRACTICE MIDWIFE

## 2021-06-07 PROCEDURE — 99999 PR PBB SHADOW E&M-EST. PATIENT-LVL II: ICD-10-PCS | Mod: PBBFAC,,, | Performed by: ADVANCED PRACTICE MIDWIFE

## 2021-06-07 PROCEDURE — 76801 OB US < 14 WKS SINGLE FETUS: CPT | Mod: 26,S$PBB,, | Performed by: OBSTETRICS & GYNECOLOGY

## 2021-06-07 PROCEDURE — 99212 OFFICE O/P EST SF 10 MIN: CPT | Mod: PBBFAC,PO,25 | Performed by: ADVANCED PRACTICE MIDWIFE

## 2021-06-07 PROCEDURE — 0500F PR INITIAL PRENATAL CARE VISIT: ICD-10-PCS | Mod: ,,, | Performed by: ADVANCED PRACTICE MIDWIFE

## 2021-06-07 PROCEDURE — 76801 OB US < 14 WKS SINGLE FETUS: CPT | Mod: PBBFAC,PO | Performed by: OBSTETRICS & GYNECOLOGY

## 2021-06-07 PROCEDURE — 0500F INITIAL PRENATAL CARE VISIT: CPT | Mod: ,,, | Performed by: ADVANCED PRACTICE MIDWIFE

## 2021-06-07 PROCEDURE — 76801 PR US, OB <14WKS, TRANSABD, SINGLE GESTATION: ICD-10-PCS | Mod: 26,S$PBB,, | Performed by: OBSTETRICS & GYNECOLOGY

## 2021-06-07 RX ORDER — PROMETHAZINE HYDROCHLORIDE 25 MG/1
25 TABLET ORAL EVERY 6 HOURS PRN
Qty: 20 TABLET | Refills: 1 | Status: SHIPPED | OUTPATIENT
Start: 2021-06-07

## 2024-06-07 NOTE — TELEPHONE ENCOUNTER
----- Message from Patricia West sent at 10/1/2019 11:08 AM CDT -----  Contact: pt  The pt request a call to schedule a appt, the pt gave no additional info and can be reached at [phone]///[id number]  
Returned call to patient.  Well woman visit scheduled 10/02/19 at 10:45 am, she confirmed appt, provider and location.  
[facility]

## 2024-12-04 ENCOUNTER — TELEPHONE (OUTPATIENT)
Dept: OBSTETRICS AND GYNECOLOGY | Facility: CLINIC | Age: 29
End: 2024-12-04
Payer: MEDICAID

## 2024-12-04 NOTE — TELEPHONE ENCOUNTER
----- Message from Mae sent at 12/4/2024 10:09 AM CST -----  Contact: Mayelin  Patient 18 weeks pregnant due date May 3rd 2025, previously being seen in [place] for pregnancy. Has moved back to [place] and needing to schedule routine ob. Has out of state medicaid. Pt can be reached at [phone].

## 2024-12-04 NOTE — TELEPHONE ENCOUNTER
Called patient and advised she needs to sign up for [place] Medicaid and get her number from them, then call back for appointment.  Patient verbalized understanding.

## 2025-01-15 ENCOUNTER — LAB VISIT (OUTPATIENT)
Dept: LAB | Facility: HOSPITAL | Age: 30
End: 2025-01-15
Attending: MIDWIFE
Payer: MEDICAID

## 2025-01-15 ENCOUNTER — PATIENT MESSAGE (OUTPATIENT)
Dept: OBSTETRICS AND GYNECOLOGY | Facility: CLINIC | Age: 30
End: 2025-01-15

## 2025-01-15 ENCOUNTER — INITIAL PRENATAL (OUTPATIENT)
Dept: OBSTETRICS AND GYNECOLOGY | Facility: CLINIC | Age: 30
End: 2025-01-15
Payer: MEDICAID

## 2025-01-15 VITALS — BODY MASS INDEX: 34.6 KG/M2 | WEIGHT: 171.31 LBS | DIASTOLIC BLOOD PRESSURE: 66 MMHG | SYSTOLIC BLOOD PRESSURE: 114 MMHG

## 2025-01-15 DIAGNOSIS — Z86.19 HISTORY OF CHLAMYDIA: ICD-10-CM

## 2025-01-15 DIAGNOSIS — Z34.82 ENCOUNTER FOR SUPERVISION OF OTHER NORMAL PREGNANCY, SECOND TRIMESTER: ICD-10-CM

## 2025-01-15 DIAGNOSIS — F31.61 BIPOLAR DISORDER, CURRENT EPISODE MIXED, MILD: Primary | ICD-10-CM

## 2025-01-15 DIAGNOSIS — E03.8 OTHER SPECIFIED HYPOTHYROIDISM: ICD-10-CM

## 2025-01-15 DIAGNOSIS — Z36.89 ENCOUNTER FOR FETAL ANATOMIC SURVEY: ICD-10-CM

## 2025-01-15 LAB
T3 SERPL-MCNC: 163 NG/DL (ref 60–180)
T4 FREE SERPL-MCNC: 0.77 NG/DL (ref 0.71–1.51)
TSH SERPL DL<=0.005 MIU/L-ACNC: 3.56 UIU/ML (ref 0.4–4)

## 2025-01-15 PROCEDURE — 84443 ASSAY THYROID STIM HORMONE: CPT | Performed by: MIDWIFE

## 2025-01-15 PROCEDURE — 99999 PR PBB SHADOW E&M-EST. PATIENT-LVL II: CPT | Mod: PBBFAC,,, | Performed by: MIDWIFE

## 2025-01-15 PROCEDURE — 99204 OFFICE O/P NEW MOD 45 MIN: CPT | Mod: TH,S$PBB,, | Performed by: MIDWIFE

## 2025-01-15 PROCEDURE — 36415 COLL VENOUS BLD VENIPUNCTURE: CPT | Performed by: MIDWIFE

## 2025-01-15 PROCEDURE — 84480 ASSAY TRIIODOTHYRONINE (T3): CPT | Performed by: MIDWIFE

## 2025-01-15 PROCEDURE — 99212 OFFICE O/P EST SF 10 MIN: CPT | Mod: PBBFAC,TH | Performed by: MIDWIFE

## 2025-01-15 PROCEDURE — 84439 ASSAY OF FREE THYROXINE: CPT | Performed by: MIDWIFE

## 2025-01-15 PROCEDURE — 87591 N.GONORRHOEAE DNA AMP PROB: CPT | Performed by: MIDWIFE

## 2025-01-15 RX ORDER — FLUOXETINE 10 MG/1
10 CAPSULE ORAL DAILY
Qty: 30 CAPSULE | Refills: 11 | Status: SHIPPED | OUTPATIENT
Start: 2025-01-15 | End: 2026-01-15

## 2025-01-17 LAB
C TRACH DNA SPEC QL NAA+PROBE: NOT DETECTED
N GONORRHOEA DNA SPEC QL NAA+PROBE: NOT DETECTED

## 2025-01-21 ENCOUNTER — PATIENT MESSAGE (OUTPATIENT)
Dept: OBSTETRICS AND GYNECOLOGY | Facility: CLINIC | Age: 30
End: 2025-01-21
Payer: MEDICAID

## 2025-01-25 ENCOUNTER — PATIENT MESSAGE (OUTPATIENT)
Dept: OTHER | Facility: OTHER | Age: 30
End: 2025-01-25
Payer: MEDICAID

## 2025-01-27 NOTE — PROGRESS NOTES
29 y.o. female  at 24w4d   Reports + FM, denies VB, LOF or CTX  Doing well without concerns, transfer from [place], records release sent, was having thyroid issues, was on Prozac, was helping, would like refill, psych resources given, had chlamydia in [place], retest today     28wk labs at [facility] (O NEG)  Rhogam at [facility]  1. Bipolar disorder, current episode mixed, mild  -     FLUoxetine 10 MG capsule; Take 1 capsule (10 mg total) by mouth once daily.  Dispense: 30 capsule; Refill: 11    2. Encounter for fetal anatomic survey  -     US OB/GYN Procedure ([facility])-Future; Future    3. Other specified hypothyroidism  -     TSH; Future; Expected date: 01/15/2025  -     T3; Future; Expected date: 01/15/2025  -     T4, FREE; Future; Expected date: 01/15/2025    4. Encounter for supervision of other normal pregnancy, second trimester  -     CBC Auto Differential; Future; Expected date: 01/15/2025  -     HIV 1/2 Ag/Ab (4th Gen); Future; Expected date: 01/15/2025  -     OB Glucose Screen; Future; Expected date: 01/15/2025  -     Treponema Pallidium Antibodies IgG, IgM; Future; Expected date: 01/15/2025    5. History of chlamydia  -     C. trachomatis/N. gonorrhoeae by AMP DNA         Reviewed warning signs, normal FKCs,  labor precautions and how/when to call.  RTC x 2 wks, call or present sooner prn.

## 2025-01-28 ENCOUNTER — PROCEDURE VISIT (OUTPATIENT)
Dept: OBSTETRICS AND GYNECOLOGY | Facility: CLINIC | Age: 30
End: 2025-01-28
Payer: MEDICAID

## 2025-01-28 DIAGNOSIS — Z36.89 ENCOUNTER FOR FETAL ANATOMIC SURVEY: ICD-10-CM

## 2025-01-28 PROCEDURE — 76805 OB US >/= 14 WKS SNGL FETUS: CPT | Mod: PBBFAC | Performed by: STUDENT IN AN ORGANIZED HEALTH CARE EDUCATION/TRAINING PROGRAM

## 2025-02-08 ENCOUNTER — PATIENT MESSAGE (OUTPATIENT)
Dept: OTHER | Facility: OTHER | Age: 30
End: 2025-02-08
Payer: MEDICAID

## 2025-02-12 ENCOUNTER — LAB VISIT (OUTPATIENT)
Dept: LAB | Facility: HOSPITAL | Age: 30
End: 2025-02-12
Attending: MIDWIFE
Payer: MEDICAID

## 2025-02-12 ENCOUNTER — ROUTINE PRENATAL (OUTPATIENT)
Dept: OBSTETRICS AND GYNECOLOGY | Facility: CLINIC | Age: 30
End: 2025-02-12
Payer: MEDICAID

## 2025-02-12 VITALS
BODY MASS INDEX: 34.87 KG/M2 | WEIGHT: 172.63 LBS | DIASTOLIC BLOOD PRESSURE: 64 MMHG | SYSTOLIC BLOOD PRESSURE: 104 MMHG

## 2025-02-12 DIAGNOSIS — O26.899 RH NEGATIVE, ANTEPARTUM: ICD-10-CM

## 2025-02-12 DIAGNOSIS — Z36.2 ENCOUNTER FOR FOLLOW-UP ULTRASOUND OF FETAL ANATOMY: ICD-10-CM

## 2025-02-12 DIAGNOSIS — O26.899 RH NEGATIVE STATE IN ANTEPARTUM PERIOD: Primary | ICD-10-CM

## 2025-02-12 DIAGNOSIS — Z67.91 RH NEGATIVE STATE IN ANTEPARTUM PERIOD: ICD-10-CM

## 2025-02-12 DIAGNOSIS — E03.8 OTHER SPECIFIED HYPOTHYROIDISM: ICD-10-CM

## 2025-02-12 DIAGNOSIS — Z67.91 RH NEGATIVE STATE IN ANTEPARTUM PERIOD: Primary | ICD-10-CM

## 2025-02-12 DIAGNOSIS — Z67.91 RH NEGATIVE, ANTEPARTUM: ICD-10-CM

## 2025-02-12 DIAGNOSIS — Z34.82 ENCOUNTER FOR SUPERVISION OF OTHER NORMAL PREGNANCY, SECOND TRIMESTER: ICD-10-CM

## 2025-02-12 DIAGNOSIS — F31.61 BIPOLAR DISORDER, CURRENT EPISODE MIXED, MILD: ICD-10-CM

## 2025-02-12 DIAGNOSIS — O26.899 RH NEGATIVE STATE IN ANTEPARTUM PERIOD: ICD-10-CM

## 2025-02-12 LAB
ABO + RH BLD: NORMAL
BASOPHILS # BLD AUTO: 0.05 K/UL (ref 0–0.2)
BASOPHILS NFR BLD: 0.4 % (ref 0–1.9)
BLD GP AB SCN CELLS X3 SERPL QL: NORMAL
DIFFERENTIAL METHOD BLD: ABNORMAL
EOSINOPHIL # BLD AUTO: 0.1 K/UL (ref 0–0.5)
EOSINOPHIL NFR BLD: 1 % (ref 0–8)
ERYTHROCYTE [DISTWIDTH] IN BLOOD BY AUTOMATED COUNT: 14.1 % (ref 11.5–14.5)
GLUCOSE SERPL-MCNC: 132 MG/DL (ref 70–140)
HCT VFR BLD AUTO: 32.9 % (ref 37–48.5)
HGB BLD-MCNC: 10.2 G/DL (ref 12–16)
HIV 1+2 AB+HIV1 P24 AG SERPL QL IA: NORMAL
IMM GRANULOCYTES # BLD AUTO: 0.1 K/UL (ref 0–0.04)
IMM GRANULOCYTES NFR BLD AUTO: 0.9 % (ref 0–0.5)
LYMPHOCYTES # BLD AUTO: 1.8 K/UL (ref 1–4.8)
LYMPHOCYTES NFR BLD: 15.8 % (ref 18–48)
MCH RBC QN AUTO: 27.2 PG (ref 27–31)
MCHC RBC AUTO-ENTMCNC: 31 G/DL (ref 32–36)
MCV RBC AUTO: 88 FL (ref 82–98)
MONOCYTES # BLD AUTO: 0.6 K/UL (ref 0.3–1)
MONOCYTES NFR BLD: 4.8 % (ref 4–15)
NEUTROPHILS # BLD AUTO: 8.8 K/UL (ref 1.8–7.7)
NEUTROPHILS NFR BLD: 77.1 % (ref 38–73)
NRBC BLD-RTO: 0 /100 WBC
PLATELET # BLD AUTO: 302 K/UL (ref 150–450)
PMV BLD AUTO: 9.2 FL (ref 9.2–12.9)
RBC # BLD AUTO: 3.75 M/UL (ref 4–5.4)
SPECIMEN OUTDATE: NORMAL
TREPONEMA PALLIDUM IGG+IGM AB [PRESENCE] IN SERUM OR PLASMA BY IMMUNOASSAY: NONREACTIVE
WBC # BLD AUTO: 11.45 K/UL (ref 3.9–12.7)

## 2025-02-12 PROCEDURE — 86850 RBC ANTIBODY SCREEN: CPT | Performed by: ADVANCED PRACTICE MIDWIFE

## 2025-02-12 PROCEDURE — 85025 COMPLETE CBC W/AUTO DIFF WBC: CPT | Performed by: MIDWIFE

## 2025-02-12 PROCEDURE — 82950 GLUCOSE TEST: CPT | Performed by: MIDWIFE

## 2025-02-12 PROCEDURE — 99999 PR PBB SHADOW E&M-EST. PATIENT-LVL III: CPT | Mod: PBBFAC,,, | Performed by: ADVANCED PRACTICE MIDWIFE

## 2025-02-12 PROCEDURE — 96372 THER/PROPH/DIAG INJ SC/IM: CPT | Mod: PBBFAC

## 2025-02-12 PROCEDURE — 99213 OFFICE O/P EST LOW 20 MIN: CPT | Mod: PBBFAC,25,TH | Performed by: ADVANCED PRACTICE MIDWIFE

## 2025-02-12 PROCEDURE — 86593 SYPHILIS TEST NON-TREP QUANT: CPT | Performed by: MIDWIFE

## 2025-02-12 PROCEDURE — 99999PBSHW PR PBB SHADOW TECHNICAL ONLY FILED TO HB: Mod: PBBFAC,,,

## 2025-02-12 PROCEDURE — 36415 COLL VENOUS BLD VENIPUNCTURE: CPT | Performed by: MIDWIFE

## 2025-02-12 PROCEDURE — 87389 HIV-1 AG W/HIV-1&-2 AB AG IA: CPT | Performed by: MIDWIFE

## 2025-02-12 RX ADMIN — HUMAN RHO(D) IMMUNE GLOBULIN 300 MCG: 300 INJECTION, SOLUTION INTRAMUSCULAR at 10:02

## 2025-02-12 NOTE — NURSING
1500 IU RhoGAM injection administered IM to left ventrogluteal. Patient tolerated well. No pain or discomfort noted. Advised to wait 15 minutes after injection in clinic. Patient verbalized understanding.

## 2025-02-12 NOTE — PROGRESS NOTES
29 y.o. female  at 28w4d   Reports + FM, denies VB, LOF or CTX  Doing well without concerns   28wk labs today (O NEG)  Rhogam today  Tdap handout provided may ask for [facility]  Will start visits every 2 weeks    1. Rh negative state in antepartum period  -     Type & Screen; Future; Expected date: 2025  -     rho(D) immune globulin (RhoGAM/HyperRHO) IM injection    2. Other specified hypothyroidism  Overview:  Normal thyroid labs 25      3. Bipolar disorder, current episode mixed, mild  Overview:  Stable on Prozac       4. Rh negative, antepartum  Rhogam today    5. Lactating mother  -     BREAST PUMP FOR HOME USE    6. Encounter for follow-up ultrasound of fetal anatomy  Overview:  Re scan for anatomy completion @ 32 weeks     Reviewed warning signs, normal FKCs,  labor precautions and how/when to call.  RTC x 2 wks, call or present sooner prn.

## 2025-02-18 ENCOUNTER — RESULTS FOLLOW-UP (OUTPATIENT)
Dept: OBSTETRICS AND GYNECOLOGY | Facility: CLINIC | Age: 30
End: 2025-02-18
Payer: MEDICAID

## 2025-02-18 ENCOUNTER — PATIENT MESSAGE (OUTPATIENT)
Dept: OBSTETRICS AND GYNECOLOGY | Facility: CLINIC | Age: 30
End: 2025-02-18
Payer: MEDICAID

## 2025-02-18 DIAGNOSIS — O99.013 ANEMIA DURING PREGNANCY IN THIRD TRIMESTER: Primary | ICD-10-CM

## 2025-02-18 PROBLEM — Z36.2 ENCOUNTER FOR FOLLOW-UP ULTRASOUND OF FETAL ANATOMY: Status: RESOLVED | Noted: 2025-02-12 | Resolved: 2025-02-18

## 2025-02-18 RX ORDER — FERROUS SULFATE 325(65) MG
325 TABLET, DELAYED RELEASE (ENTERIC COATED) ORAL
Qty: 30 TABLET | Refills: 5 | Status: SHIPPED | OUTPATIENT
Start: 2025-02-18

## 2025-02-22 ENCOUNTER — PATIENT MESSAGE (OUTPATIENT)
Dept: OTHER | Facility: OTHER | Age: 30
End: 2025-02-22
Payer: MEDICAID

## 2025-03-08 ENCOUNTER — PATIENT MESSAGE (OUTPATIENT)
Dept: OTHER | Facility: OTHER | Age: 30
End: 2025-03-08
Payer: MEDICAID

## 2025-03-11 DIAGNOSIS — Z36.2 ENCOUNTER FOR FOLLOW-UP ULTRASOUND OF FETAL ANATOMY: Primary | ICD-10-CM

## 2025-03-12 ENCOUNTER — ROUTINE PRENATAL (OUTPATIENT)
Dept: OBSTETRICS AND GYNECOLOGY | Facility: CLINIC | Age: 30
End: 2025-03-12
Payer: MEDICAID

## 2025-03-12 VITALS
DIASTOLIC BLOOD PRESSURE: 75 MMHG | HEART RATE: 85 BPM | WEIGHT: 178.38 LBS | BODY MASS INDEX: 36.02 KG/M2 | SYSTOLIC BLOOD PRESSURE: 116 MMHG

## 2025-03-12 DIAGNOSIS — Z3A.32 32 WEEKS GESTATION OF PREGNANCY: ICD-10-CM

## 2025-03-12 DIAGNOSIS — F31.61 BIPOLAR DISORDER, CURRENT EPISODE MIXED, MILD: Primary | ICD-10-CM

## 2025-03-12 DIAGNOSIS — E03.8 OTHER SPECIFIED HYPOTHYROIDISM: ICD-10-CM

## 2025-03-12 DIAGNOSIS — Z36.2 ENCOUNTER FOR FOLLOW-UP ULTRASOUND OF FETAL ANATOMY: ICD-10-CM

## 2025-03-12 PROCEDURE — 90715 TDAP VACCINE 7 YRS/> IM: CPT | Mod: PBBFAC

## 2025-03-12 PROCEDURE — 99213 OFFICE O/P EST LOW 20 MIN: CPT | Mod: PBBFAC,TH | Performed by: ADVANCED PRACTICE MIDWIFE

## 2025-03-12 PROCEDURE — 99999 PR PBB SHADOW E&M-EST. PATIENT-LVL III: CPT | Mod: PBBFAC,,, | Performed by: ADVANCED PRACTICE MIDWIFE

## 2025-03-12 PROCEDURE — 90471 IMMUNIZATION ADMIN: CPT | Mod: PBBFAC

## 2025-03-12 PROCEDURE — 99999PBSHW PR PBB SHADOW TECHNICAL ONLY FILED TO HB: Mod: PBBFAC,,,

## 2025-03-12 RX ADMIN — TETANUS TOXOID, REDUCED DIPHTHERIA TOXOID AND ACELLULAR PERTUSSIS VACCINE, ADSORBED 0.5 ML: 5; 2.5; 8; 8; 2.5 SUSPENSION INTRAMUSCULAR at 09:03

## 2025-03-12 NOTE — PROGRESS NOTES
29 y.o. female  at 32w4d   Reports + FM, denies VB, LOF or CTX  Doing well without concerns   TWG:  lbs   Reviewed 28wk lab results (O NEG) received rhogam  Passed   Tdap today      1. Bipolar disorder, current episode mixed, mild  Overview:  Stable on Prozac       2. Other specified hypothyroidism  Overview:  Normal thyroid labs 25      3. Encounter for follow-up ultrasound of fetal anatomy  Overview:  Re scan for anatomy @ 32 weeks scheduled to be done at [facility]      4. 32 weeks gestation of pregnancy  -     Tdap (BOOSTRIX) vaccine injection 0.5 mL     Reviewed warning signs, normal FKCs,  labor precautions and how/when to call.  RTC x 2 wks, call or present sooner prn.

## 2025-03-26 ENCOUNTER — ROUTINE PRENATAL (OUTPATIENT)
Dept: OBSTETRICS AND GYNECOLOGY | Facility: CLINIC | Age: 30
End: 2025-03-26
Payer: MEDICAID

## 2025-03-26 ENCOUNTER — PROCEDURE VISIT (OUTPATIENT)
Dept: OBSTETRICS AND GYNECOLOGY | Facility: CLINIC | Age: 30
End: 2025-03-26
Payer: MEDICAID

## 2025-03-26 VITALS
DIASTOLIC BLOOD PRESSURE: 72 MMHG | WEIGHT: 178.13 LBS | BODY MASS INDEX: 35.98 KG/M2 | SYSTOLIC BLOOD PRESSURE: 116 MMHG

## 2025-03-26 DIAGNOSIS — Z36.2 ENCOUNTER FOR FOLLOW-UP ULTRASOUND OF FETAL ANATOMY: ICD-10-CM

## 2025-03-26 DIAGNOSIS — O99.013 ANEMIA DURING PREGNANCY IN THIRD TRIMESTER: ICD-10-CM

## 2025-03-26 DIAGNOSIS — F31.61 BIPOLAR DISORDER, CURRENT EPISODE MIXED, MILD: ICD-10-CM

## 2025-03-26 DIAGNOSIS — E03.8 OTHER SPECIFIED HYPOTHYROIDISM: ICD-10-CM

## 2025-03-26 DIAGNOSIS — Z67.91 RH NEGATIVE, ANTEPARTUM: Primary | ICD-10-CM

## 2025-03-26 DIAGNOSIS — O26.899 RH NEGATIVE, ANTEPARTUM: Primary | ICD-10-CM

## 2025-03-26 PROCEDURE — 76816 OB US FOLLOW-UP PER FETUS: CPT | Mod: PBBFAC | Performed by: OBSTETRICS & GYNECOLOGY

## 2025-03-26 PROCEDURE — 99999 PR PBB SHADOW E&M-EST. PATIENT-LVL II: CPT | Mod: PBBFAC,,, | Performed by: MIDWIFE

## 2025-03-26 PROCEDURE — 99212 OFFICE O/P EST SF 10 MIN: CPT | Mod: PBBFAC,TH | Performed by: MIDWIFE

## 2025-03-26 PROCEDURE — 99213 OFFICE O/P EST LOW 20 MIN: CPT | Mod: TH,S$PBB,, | Performed by: MIDWIFE

## 2025-03-26 NOTE — PROGRESS NOTES
29 y.o. female  at 34w4d   Reports + FM, denies VB, LOF or CTX  Doing well without concerns, voices no complaints.   US today for growth and f/u anatomy: cephalic, EFW 40%%tile, 09936b, 5lbs 7oz, normal MVP 3.0cm  GBS collection reviewed, will collect [facility] at 36w  Birth preferences reviewed     1. Rh negative, antepartum  Overview:   RhoGAM 25    2. Other specified hypothyroidism  Overview:  Normal thyroid labs 25      3. Anemia during pregnancy in third trimester  Overview:  Fe daily      4. Bipolar disorder, current episode mixed, mild  Overview:  Stable on Prozac            Reviewed warning signs, normal FKCs,  labor precautions and how/when to call.  RTC x 2 wks, call or present sooner prn.

## 2025-03-29 ENCOUNTER — PATIENT MESSAGE (OUTPATIENT)
Dept: OTHER | Facility: OTHER | Age: 30
End: 2025-03-29
Payer: MEDICAID

## 2025-04-09 ENCOUNTER — ROUTINE PRENATAL (OUTPATIENT)
Dept: OBSTETRICS AND GYNECOLOGY | Facility: CLINIC | Age: 30
End: 2025-04-09
Payer: MEDICAID

## 2025-04-09 ENCOUNTER — LAB VISIT (OUTPATIENT)
Dept: LAB | Facility: HOSPITAL | Age: 30
End: 2025-04-09
Attending: ADVANCED PRACTICE MIDWIFE
Payer: MEDICAID

## 2025-04-09 VITALS
WEIGHT: 179.88 LBS | SYSTOLIC BLOOD PRESSURE: 104 MMHG | DIASTOLIC BLOOD PRESSURE: 64 MMHG | BODY MASS INDEX: 36.33 KG/M2

## 2025-04-09 DIAGNOSIS — O99.013 ANEMIA DURING PREGNANCY IN THIRD TRIMESTER: Primary | ICD-10-CM

## 2025-04-09 DIAGNOSIS — E03.8 OTHER SPECIFIED HYPOTHYROIDISM: ICD-10-CM

## 2025-04-09 DIAGNOSIS — F31.61 BIPOLAR DISORDER, CURRENT EPISODE MIXED, MILD: ICD-10-CM

## 2025-04-09 DIAGNOSIS — Z3A.36 36 WEEKS GESTATION OF PREGNANCY: ICD-10-CM

## 2025-04-09 LAB
T4 FREE SERPL-MCNC: 0.79 NG/DL (ref 0.71–1.51)
TSH SERPL-ACNC: 4.47 UIU/ML (ref 0.4–4)

## 2025-04-09 PROCEDURE — 87653 STREP B DNA AMP PROBE: CPT | Performed by: ADVANCED PRACTICE MIDWIFE

## 2025-04-09 PROCEDURE — 84439 ASSAY OF FREE THYROXINE: CPT

## 2025-04-09 PROCEDURE — 84443 ASSAY THYROID STIM HORMONE: CPT

## 2025-04-09 PROCEDURE — 99999 PR PBB SHADOW E&M-EST. PATIENT-LVL III: CPT | Mod: PBBFAC,,, | Performed by: ADVANCED PRACTICE MIDWIFE

## 2025-04-09 PROCEDURE — 99213 OFFICE O/P EST LOW 20 MIN: CPT | Mod: PBBFAC,TH | Performed by: ADVANCED PRACTICE MIDWIFE

## 2025-04-09 PROCEDURE — 36415 COLL VENOUS BLD VENIPUNCTURE: CPT

## 2025-04-09 NOTE — PROGRESS NOTES
29 y.o. female  at 36w4d  Reports + FM, denies VB, LOF or regular CTX  Doing well without concerns   GBS collected today   The skin of the suprapubic region was evaluated and appears intact without lesions.      1. Anemia during pregnancy in third trimester  Overview:  Fe daily      2. Bipolar disorder, current episode mixed, mild  Overview:  Stable on Prozac       3. Other specified hypothyroidism  Overview:  Normal thyroid labs 25  Will draw new labs 25    Orders:  -     TSH; Future; Expected date: 2025  -     T4, Free; Future; Expected date: 2025    4. 36 weeks gestation of pregnancy  -     Group B Streptococcus, PCR     Reviewed warning signs, normal FKCs, labor precautions and how/when to call.  RTC x 1 wk, call or present sooner prn.

## 2025-04-10 ENCOUNTER — RESULTS FOLLOW-UP (OUTPATIENT)
Dept: OBSTETRICS AND GYNECOLOGY | Facility: CLINIC | Age: 30
End: 2025-04-10

## 2025-04-11 LAB — GROUP B STREPTOCOCCUS, PCR (OHS): NEGATIVE

## 2025-04-17 ENCOUNTER — ROUTINE PRENATAL (OUTPATIENT)
Dept: OBSTETRICS AND GYNECOLOGY | Facility: CLINIC | Age: 30
End: 2025-04-17
Payer: MEDICAID

## 2025-04-17 VITALS
WEIGHT: 181.19 LBS | BODY MASS INDEX: 36.6 KG/M2 | DIASTOLIC BLOOD PRESSURE: 77 MMHG | SYSTOLIC BLOOD PRESSURE: 123 MMHG | HEART RATE: 83 BPM

## 2025-04-17 DIAGNOSIS — Z34.80 ENCOUNTER FOR SUPERVISION OF NORMAL PREGNANCY IN MULTIGRAVIDA: ICD-10-CM

## 2025-04-17 DIAGNOSIS — F31.61 BIPOLAR DISORDER, CURRENT EPISODE MIXED, MILD: Primary | ICD-10-CM

## 2025-04-17 DIAGNOSIS — O99.013 ANEMIA DURING PREGNANCY IN THIRD TRIMESTER: ICD-10-CM

## 2025-04-17 PROBLEM — Z36.2 ENCOUNTER FOR FOLLOW-UP ULTRASOUND OF FETAL ANATOMY: Status: RESOLVED | Noted: 2025-02-12 | Resolved: 2025-04-17

## 2025-04-17 PROCEDURE — 99213 OFFICE O/P EST LOW 20 MIN: CPT | Mod: PBBFAC,TH | Performed by: ADVANCED PRACTICE MIDWIFE

## 2025-04-17 PROCEDURE — 99999 PR PBB SHADOW E&M-EST. PATIENT-LVL III: CPT | Mod: PBBFAC,,, | Performed by: ADVANCED PRACTICE MIDWIFE

## 2025-04-17 NOTE — PROGRESS NOTES
29 y.o. female  at 37w5d   Reports + FM, denies VB, LOF or regular CTX  Doing well without concerns       1. Bipolar disorder, current episode mixed, mild  Overview:  Stable on Prozac       2. Anemia during pregnancy in third trimester  Overview:  Fe daily      3. Encounter for supervision of normal pregnancy in multigravida         Reviewed warning signs, normal FKCs, labor precautions and how/when to call.  RTC x 1 wk, call or present sooner prn.

## 2025-04-25 ENCOUNTER — ROUTINE PRENATAL (OUTPATIENT)
Dept: OBSTETRICS AND GYNECOLOGY | Facility: CLINIC | Age: 30
End: 2025-04-25
Payer: MEDICAID

## 2025-04-25 VITALS
SYSTOLIC BLOOD PRESSURE: 126 MMHG | DIASTOLIC BLOOD PRESSURE: 86 MMHG | WEIGHT: 182.56 LBS | BODY MASS INDEX: 36.87 KG/M2

## 2025-04-25 DIAGNOSIS — F31.61 BIPOLAR DISORDER, CURRENT EPISODE MIXED, MILD: ICD-10-CM

## 2025-04-25 DIAGNOSIS — O99.013 ANEMIA DURING PREGNANCY IN THIRD TRIMESTER: Primary | ICD-10-CM

## 2025-04-25 DIAGNOSIS — Z34.80 ENCOUNTER FOR SUPERVISION OF NORMAL PREGNANCY IN MULTIGRAVIDA: ICD-10-CM

## 2025-04-25 PROCEDURE — 99999 PR PBB SHADOW E&M-EST. PATIENT-LVL II: CPT | Mod: PBBFAC,,, | Performed by: ADVANCED PRACTICE MIDWIFE

## 2025-04-25 PROCEDURE — 99212 OFFICE O/P EST SF 10 MIN: CPT | Mod: PBBFAC,TH | Performed by: ADVANCED PRACTICE MIDWIFE

## 2025-04-25 NOTE — PROGRESS NOTES
29 y.o. female  at 38w6d   Reports + FM, denies VB, LOF or regular CTX  Doing well without concerns; irregular contractions    TW lbs     /-2-small amount of blood noted after exam    1. Anemia during pregnancy in third trimester  Overview:  Fe daily      2. Encounter for supervision of normal pregnancy in multigravida  Discussed [place] circuit    3. Bipolar disorder, current episode mixed, mild  Overview:  Stable on Prozac       Reviewed warning signs, normal FKCs, labor precautions and how/when to call.  RTC x 1 wk, call or present sooner prn.

## 2025-04-28 ENCOUNTER — PATIENT MESSAGE (OUTPATIENT)
Dept: OBSTETRICS AND GYNECOLOGY | Facility: CLINIC | Age: 30
End: 2025-04-28
Payer: MEDICAID

## 2025-05-01 ENCOUNTER — ROUTINE PRENATAL (OUTPATIENT)
Dept: OBSTETRICS AND GYNECOLOGY | Facility: CLINIC | Age: 30
End: 2025-05-01
Payer: MEDICAID

## 2025-05-01 VITALS
SYSTOLIC BLOOD PRESSURE: 110 MMHG | WEIGHT: 182.56 LBS | DIASTOLIC BLOOD PRESSURE: 72 MMHG | BODY MASS INDEX: 36.87 KG/M2

## 2025-05-01 DIAGNOSIS — Z34.80 ENCOUNTER FOR SUPERVISION OF NORMAL PREGNANCY IN MULTIGRAVIDA: ICD-10-CM

## 2025-05-01 DIAGNOSIS — F31.61 BIPOLAR DISORDER, CURRENT EPISODE MIXED, MILD: Primary | ICD-10-CM

## 2025-05-01 DIAGNOSIS — O48.0 POST-TERM PREGNANCY, 40-42 WEEKS OF GESTATION: ICD-10-CM

## 2025-05-01 PROCEDURE — 99212 OFFICE O/P EST SF 10 MIN: CPT | Mod: PBBFAC,TH | Performed by: ADVANCED PRACTICE MIDWIFE

## 2025-05-01 PROCEDURE — 99999 PR PBB SHADOW E&M-EST. PATIENT-LVL II: CPT | Mod: PBBFAC,,, | Performed by: ADVANCED PRACTICE MIDWIFE

## 2025-05-01 NOTE — PROGRESS NOTES
29 y.o. female  at 39w5d   Reports + FM, denies VB, LOF or regular CTX  TW lbs     1. Bipolar disorder, current episode mixed, mild  Overview:  Stable on Prozac       2. Encounter for supervision of normal pregnancy in multigravida    3. Post-term pregnancy, 40-42 weeks of gestation  -     US OB/GYN Procedure ([facility])-Future; Future    Reviewed warning signs, normal FKCs, labor precautions and how/when to call.  RTC x 1 wk, call or present sooner prn.

## 2025-05-03 ENCOUNTER — PATIENT MESSAGE (OUTPATIENT)
Dept: OBSTETRICS AND GYNECOLOGY | Facility: CLINIC | Age: 30
End: 2025-05-03
Payer: MEDICAID

## 2025-05-07 ENCOUNTER — ROUTINE PRENATAL (OUTPATIENT)
Dept: OBSTETRICS AND GYNECOLOGY | Facility: CLINIC | Age: 30
End: 2025-05-07
Payer: MEDICAID

## 2025-05-07 ENCOUNTER — PROCEDURE VISIT (OUTPATIENT)
Dept: OBSTETRICS AND GYNECOLOGY | Facility: CLINIC | Age: 30
End: 2025-05-07
Payer: MEDICAID

## 2025-05-07 ENCOUNTER — TELEPHONE (OUTPATIENT)
Dept: OBSTETRICS AND GYNECOLOGY | Facility: CLINIC | Age: 30
End: 2025-05-07
Payer: MEDICAID

## 2025-05-07 VITALS
SYSTOLIC BLOOD PRESSURE: 114 MMHG | BODY MASS INDEX: 37.09 KG/M2 | WEIGHT: 183.63 LBS | HEART RATE: 86 BPM | DIASTOLIC BLOOD PRESSURE: 76 MMHG

## 2025-05-07 DIAGNOSIS — O48.0 POST-TERM PREGNANCY, 40-42 WEEKS OF GESTATION: ICD-10-CM

## 2025-05-07 DIAGNOSIS — O48.0 POST-TERM PREGNANCY, 40-42 WEEKS OF GESTATION: Primary | ICD-10-CM

## 2025-05-07 DIAGNOSIS — Z34.90 ENCOUNTER FOR INDUCTION OF LABOR: Primary | ICD-10-CM

## 2025-05-07 DIAGNOSIS — O26.899 RH NEGATIVE, ANTEPARTUM: ICD-10-CM

## 2025-05-07 DIAGNOSIS — Z67.91 RH NEGATIVE, ANTEPARTUM: ICD-10-CM

## 2025-05-07 PROCEDURE — 76819 FETAL BIOPHYS PROFIL W/O NST: CPT | Mod: PBBFAC | Performed by: OBSTETRICS & GYNECOLOGY

## 2025-05-07 PROCEDURE — 99213 OFFICE O/P EST LOW 20 MIN: CPT | Mod: PBBFAC,TH,25 | Performed by: ADVANCED PRACTICE MIDWIFE

## 2025-05-07 PROCEDURE — 99999 PR PBB SHADOW E&M-EST. PATIENT-LVL III: CPT | Mod: PBBFAC,,, | Performed by: ADVANCED PRACTICE MIDWIFE

## 2025-05-07 RX ORDER — CARBOPROST TROMETHAMINE 250 UG/ML
250 INJECTION, SOLUTION INTRAMUSCULAR
Status: CANCELLED | OUTPATIENT
Start: 2025-05-07

## 2025-05-07 RX ORDER — SODIUM CHLORIDE 9 MG/ML
INJECTION, SOLUTION INTRAVENOUS
Status: CANCELLED | OUTPATIENT
Start: 2025-05-07

## 2025-05-07 RX ORDER — OXYTOCIN-SODIUM CHLORIDE 0.9% IV SOLN 30 UNIT/500ML 30-0.9/5 UT/ML-%
10 SOLUTION INTRAVENOUS ONCE AS NEEDED
Status: CANCELLED | OUTPATIENT
Start: 2025-05-07 | End: 2036-10-03

## 2025-05-07 RX ORDER — OXYTOCIN/0.9 % SODIUM CHLORIDE 15/250 ML
0-32 PLASTIC BAG, INJECTION (ML) INTRAVENOUS CONTINUOUS
Status: CANCELLED | OUTPATIENT
Start: 2025-05-07

## 2025-05-07 RX ORDER — LIDOCAINE HYDROCHLORIDE 10 MG/ML
10 INJECTION, SOLUTION INFILTRATION; PERINEURAL ONCE AS NEEDED
Status: CANCELLED | OUTPATIENT
Start: 2025-05-07 | End: 2036-10-03

## 2025-05-07 RX ORDER — SIMETHICONE 80 MG
1 TABLET,CHEWABLE ORAL 4 TIMES DAILY PRN
Status: CANCELLED | OUTPATIENT
Start: 2025-05-07

## 2025-05-07 RX ORDER — OXYTOCIN/0.9 % SODIUM CHLORIDE 15/250 ML
95 PLASTIC BAG, INJECTION (ML) INTRAVENOUS ONCE AS NEEDED
Status: CANCELLED | OUTPATIENT
Start: 2025-05-07 | End: 2036-10-03

## 2025-05-07 RX ORDER — MISOPROSTOL 200 UG/1
800 TABLET ORAL ONCE AS NEEDED
Status: CANCELLED | OUTPATIENT
Start: 2025-05-07

## 2025-05-07 RX ORDER — OXYTOCIN/0.9 % SODIUM CHLORIDE 15/250 ML
95 PLASTIC BAG, INJECTION (ML) INTRAVENOUS CONTINUOUS PRN
Status: CANCELLED | OUTPATIENT
Start: 2025-05-07

## 2025-05-07 RX ORDER — ONDANSETRON 8 MG/1
8 TABLET, ORALLY DISINTEGRATING ORAL EVERY 8 HOURS PRN
Status: CANCELLED | OUTPATIENT
Start: 2025-05-07

## 2025-05-07 RX ORDER — OXYTOCIN 10 [USP'U]/ML
10 INJECTION, SOLUTION INTRAMUSCULAR; INTRAVENOUS ONCE AS NEEDED
Status: CANCELLED | OUTPATIENT
Start: 2025-05-07 | End: 2036-10-03

## 2025-05-07 RX ORDER — METHYLERGONOVINE MALEATE 0.2 MG/ML
200 INJECTION INTRAVENOUS ONCE AS NEEDED
Status: CANCELLED | OUTPATIENT
Start: 2025-05-07 | End: 2036-10-03

## 2025-05-07 RX ORDER — SODIUM CHLORIDE, SODIUM LACTATE, POTASSIUM CHLORIDE, CALCIUM CHLORIDE 600; 310; 30; 20 MG/100ML; MG/100ML; MG/100ML; MG/100ML
INJECTION, SOLUTION INTRAVENOUS CONTINUOUS
Status: CANCELLED | OUTPATIENT
Start: 2025-05-07

## 2025-05-07 RX ORDER — MISOPROSTOL 200 UG/1
800 TABLET ORAL ONCE AS NEEDED
Status: CANCELLED | OUTPATIENT
Start: 2025-05-07 | End: 2036-10-03

## 2025-05-07 RX ORDER — MUPIROCIN 20 MG/G
OINTMENT TOPICAL
Status: CANCELLED | OUTPATIENT
Start: 2025-05-07

## 2025-05-07 RX ORDER — CALCIUM CARBONATE 200(500)MG
500 TABLET,CHEWABLE ORAL 3 TIMES DAILY PRN
Status: CANCELLED | OUTPATIENT
Start: 2025-05-07

## 2025-05-07 RX ORDER — DIPHENOXYLATE HYDROCHLORIDE AND ATROPINE SULFATE 2.5; .025 MG/1; MG/1
2 TABLET ORAL EVERY 6 HOURS PRN
Status: CANCELLED | OUTPATIENT
Start: 2025-05-07

## 2025-05-07 NOTE — TELEPHONE ENCOUNTER
Left message for patient to return call to [phone].    Ultrasound rescheduled to 2:20 today before 3:30  appointment.

## 2025-05-07 NOTE — PROGRESS NOTES
29 y.o. female  at 40w4d   Reports + FM, denies VB, LOF or regular CTX  TW lbs   Desires IOL.  Pitocin and cooks reviewed  Scheduled for tomorrow 0800.  Orders placed  BPP today 8 of 8, VTX, MVP 5.4      1. Encounter for induction of labor  -     IP OB Labor Induction; Future  -     Vital Signs; Standing  -     Vital signs- Early Labor(0-4 cm); Standing  -     Vital Signs- Active Labor (4-10 cm); Standing  -     Vital Signs Post Delivery; Standing  -     Check Temperature, Membranes Intact; Standing  -     Check Temperature, Membranes Ruptured; Standing  -     Pulse Oximetry Continous while CONTINUOUS electronic fetal Monitor in use; Standing  -     Cervical Exam; Standing  -     Fetal / Uterine Monitoring - Continuous; Standing  -     Fetal monitoring - scalp electrode; Standing  -     Insert peripheral IV; Standing  -     [place] to Gravity; Standing  -     [place] Catheter Care every 12 hours; Standing  -     Nurse to discontinue ov[place]o when patient no longer meets criteria; Standing  -     Post [place] Catheter Removal Protocol; Standing  -     Perform Bladder scan; Standing  -     Perform Intermittent Catheterization; Standing  -     Perform Bladder Scan, post intermittent cath; Standing  -     Place indwelling catheter; Standing  -     Watch for excessive vaginal bleeding; Standing  -     Chlorhexidine Bath; Standing  -     Decrease Oxytocin; Standing  -     Discontinue oxytocin; Standing  -     Oxytocin Titration Resumption; Standing  -     Amnioinfusion PRN recurrent variable decelerations; Standing  -     Administer a 500 -1000 ml IV fluid bolus as needed for; Standing  -     Assess fundal height/uterine firmness, lochia, perineum; Standing  -     Notify Physician; Standing  -     Notify OB care provider; Standing  -     Notify physician of cervical change or lack of change; Standing  -     Notify physician for excessive uterine activity; Standing  -     Notify physician for Fetal Heart Tones  consistent with Category II or Category III tracing; Standing  -     Notify Pediatrician after delivery; Standing  -     Notify Nursery / Level II Nursery; Standing  -     Notify Nursery / Level II Nursery; Standing  -     Abdominal prep for  Section; Standing  -     Chlorhexidine (CHG) 2% Wipes; Standing  -     Chlorohexidine Gluconate Bath; Standing  -     Vital signs every 15 minutes; Standing  -     Vital signs every 15 minutes; Standing  -     miSOPROStoL tablet 800 mcg  -     miSOPROStoL tablet 800 mcg  -     CBC with Auto Differential; Standing  -     Type & Screen, Labor & Delivery; Standing  -     Treponema Pallidium Antibodies IgG, IgM; Standing  -     HIV 1/2 Ag/Ab (4th Gen); Standing  -     Inpatient consult to Anesthesiology; Standing  -     Full code; Standing  -     Admit to Inpatient; Standing  -     Diet Adult Regular; Standing  -     Reason for no VTE Prophylaxis; Standing  -     Decrease Oxytocin by 2 mu/min PRN for:; Standing  -     Decrease the oxytocin by 50% if the FHR tracing shows no improvement or there is no resolution of tachysystole within 30 minutes or if the FHR tracing worsens despite the reduction by 2mU/min; Standing  -     Discontinue the oxytocin infusion if the non-reassuring FHR tracing or tachysystole do not resolve within 30 minutes or worsens despite the 50% reduction in the oxytocin rate; Standing  -     For tachysystole :; Standing  -     Decrease Oxytocin by 2 mu/min PRN for tachysystole that persists after lateral position change and (if not contraindicated) an IV fluid bolus; Standing  -     Decrease Oxytocin by 50% if tachysystole persists after decreasing by 2 mu/min after 30 minutes.; Standing  -     Discontinue Oxytocin PRN if tachysystole persists after decreasing Oxytocin by 50% after 30 minutes.; Standing  -     Resume Oxytocin when uterine activity is appropriate and fetal oxygenation is demonstrated.; Standing  -     Notify OB; Standing    2. Rh  negative, antepartum  Overview:  Cord blood at delivery      3. Post-term pregnancy, 40-42 weeks of gestation    Other orders  -     Change position, roll left or right; Standing  -     lactated ringers bolus 1,000 mL  -     lactated ringers bolus 500 mL  -     lactated ringers infusion  -     0.9% NaCl infusion  -     IP VTE LOW RISK PATIENT; Standing  -     mupirocin 2 % ointment  -     oxytocin 15 units/250 mL (60 milliunits/mL) in 0.9% NaCl IV bolus from bag  -     oxytocin 15 units/250 mL (60 milliunits/mL) in 0.9% NaCl infusion (non-titrating)  -     ondansetron disintegrating tablet 8 mg  -     calcium carbonate 200 mg calcium (500 mg) chewable tablet 500 mg  -     simethicone chewable tablet 80 mg  -     LIDOcaine HCL 10 mg/ml (1%) injection 10 mL  -     oxytocin 15 units/250 mL (60 milliunits/mL) in 0.9% NaCl IV bolus from bag  -     oxytocin 15 units/250 mL (60 milliunits/mL) in 0.9% NaCl infusion (non-titrating)  -     oxytocin injection 10 Units  -     methylergonovine injection 200 mcg  -     carboprost injection 250 mcg  -     tranexamic acid (CYKLOKAPRON) 1,000 mg in 0.9% NaCl 100 mL  -     diphenoxylate-atropine 2.5-0.025 mg per tablet 2 tablet  -     lactated ringers bolus 500 mL  -     oxytocin 15 units/250 mL (60 milliunits/mL) in 0.9% NaCl infusion (titrating)         Reviewed warning signs, normal FKCs, labor precautions and how/when to call.

## 2025-05-07 NOTE — TELEPHONE ENCOUNTER
----- Message from Carmelita Delgado sent at 5/7/2025  7:51 AM CDT -----  This patient needs to get rescheduled with a post dates ultrasound.  I placed the order.  TY

## 2025-05-08 ENCOUNTER — HOSPITAL ENCOUNTER (INPATIENT)
Facility: HOSPITAL | Age: 30
LOS: 2 days | Discharge: HOME | End: 2025-05-10
Attending: OBSTETRICS & GYNECOLOGY | Admitting: OBSTETRICS & GYNECOLOGY
Payer: MEDICAID

## 2025-05-08 DIAGNOSIS — Z34.90 ENCOUNTER FOR INDUCTION OF LABOR: ICD-10-CM

## 2025-05-08 LAB
ABO GROUP BLD: NORMAL
ABSOLUTE NEUTROPHIL MANUAL (OHS): 7.8 K/UL
BLD GP AB SCN CELLS X3 SERPL QL: NORMAL
ERYTHROCYTE [DISTWIDTH] IN BLOOD BY AUTOMATED COUNT: 17.5 % (ref 11.5–14.5)
HBV SURFACE AG SERPL QL IA: NORMAL
HCT VFR BLD AUTO: 31.1 % (ref 37–48.5)
HGB BLD-MCNC: 9.5 GM/DL (ref 12–16)
HIV 1+2 AB+HIV1 P24 AG SERPL QL IA: NEGATIVE
LYMPHOCYTES NFR BLD MANUAL: 19 % (ref 18–48)
MCH RBC QN AUTO: 22.3 PG (ref 27–31)
MCHC RBC AUTO-ENTMCNC: 30.5 G/DL (ref 32–36)
MCV RBC AUTO: 73 FL (ref 82–98)
METAMYELOCYTES NFR BLD MANUAL: 3 %
MONOCYTES NFR BLD MANUAL: 10 % (ref 4–15)
NEUTROPHILS NFR BLD MANUAL: 68 % (ref 38–73)
NUCLEATED RBC (/100WBC) (OHS): 0 /100 WBC
PLATELET # BLD AUTO: 289 K/UL (ref 150–450)
PMV BLD AUTO: 9.2 FL (ref 9.2–12.9)
RBC # BLD AUTO: 4.26 M/UL (ref 4–5.4)
RH BLD: NORMAL
RH BLD: NORMAL
RH IMMUNE GLOBULIN: NORMAL
ROSETTE - FMH (FETAL BLEED SCREEN): NORMAL
T PALLIDUM IGG+IGM SER QL: NORMAL
WBC # BLD AUTO: 11.43 K/UL (ref 3.9–12.7)

## 2025-05-08 PROCEDURE — 63600175 PHARM REV CODE 636 W HCPCS: Performed by: ADVANCED PRACTICE MIDWIFE

## 2025-05-08 PROCEDURE — 3E033VJ INTRODUCTION OF OTHER HORMONE INTO PERIPHERAL VEIN, PERCUTANEOUS APPROACH: ICD-10-PCS | Performed by: OBSTETRICS & GYNECOLOGY

## 2025-05-08 PROCEDURE — 86901 BLOOD TYPING SEROLOGIC RH(D): CPT | Performed by: ADVANCED PRACTICE MIDWIFE

## 2025-05-08 PROCEDURE — 63600519 RHOGAM PHARM REV CODE 636 ALT 250 W HCPCS: Performed by: ADVANCED PRACTICE MIDWIFE

## 2025-05-08 PROCEDURE — 87340 HEPATITIS B SURFACE AG IA: CPT | Performed by: ADVANCED PRACTICE MIDWIFE

## 2025-05-08 PROCEDURE — 25000003 PHARM REV CODE 250: Performed by: ADVANCED PRACTICE MIDWIFE

## 2025-05-08 PROCEDURE — 85461 HEMOGLOBIN FETAL: CPT | Performed by: OBSTETRICS & GYNECOLOGY

## 2025-05-08 PROCEDURE — 72200005 HC VAGINAL DELIVERY LEVEL II

## 2025-05-08 PROCEDURE — 11000001 HC ACUTE MED/SURG PRIVATE ROOM

## 2025-05-08 PROCEDURE — 86900 BLOOD TYPING SEROLOGIC ABO: CPT | Performed by: ADVANCED PRACTICE MIDWIFE

## 2025-05-08 PROCEDURE — 72100002 HC LABOR CARE, 1ST 8 HOURS

## 2025-05-08 PROCEDURE — 36415 COLL VENOUS BLD VENIPUNCTURE: CPT | Performed by: ADVANCED PRACTICE MIDWIFE

## 2025-05-08 PROCEDURE — 86593 SYPHILIS TEST NON-TREP QUANT: CPT | Performed by: ADVANCED PRACTICE MIDWIFE

## 2025-05-08 PROCEDURE — 59409 OBSTETRICAL CARE: CPT | Mod: GB,,, | Performed by: ADVANCED PRACTICE MIDWIFE

## 2025-05-08 PROCEDURE — 86900 BLOOD TYPING SEROLOGIC ABO: CPT | Mod: 91 | Performed by: ADVANCED PRACTICE MIDWIFE

## 2025-05-08 PROCEDURE — 86850 RBC ANTIBODY SCREEN: CPT | Performed by: ADVANCED PRACTICE MIDWIFE

## 2025-05-08 PROCEDURE — 3E0234Z INTRODUCTION OF SERUM, TOXOID AND VACCINE INTO MUSCLE, PERCUTANEOUS APPROACH: ICD-10-PCS | Performed by: OBSTETRICS & GYNECOLOGY

## 2025-05-08 PROCEDURE — 85007 BL SMEAR W/DIFF WBC COUNT: CPT | Performed by: ADVANCED PRACTICE MIDWIFE

## 2025-05-08 PROCEDURE — 87389 HIV-1 AG W/HIV-1&-2 AB AG IA: CPT | Performed by: ADVANCED PRACTICE MIDWIFE

## 2025-05-08 PROCEDURE — 86762 RUBELLA ANTIBODY: CPT | Performed by: ADVANCED PRACTICE MIDWIFE

## 2025-05-08 RX ORDER — TRANEXAMIC ACID 10 MG/ML
1000 INJECTION, SOLUTION INTRAVENOUS EVERY 30 MIN PRN
Status: DISCONTINUED | OUTPATIENT
Start: 2025-05-08 | End: 2025-05-10 | Stop reason: HOSPADM

## 2025-05-08 RX ORDER — TRANEXAMIC ACID 10 MG/ML
1000 INJECTION, SOLUTION INTRAVENOUS EVERY 30 MIN PRN
Status: DISCONTINUED | OUTPATIENT
Start: 2025-05-08 | End: 2025-05-08

## 2025-05-08 RX ORDER — OXYTOCIN 10 [USP'U]/ML
10 INJECTION, SOLUTION INTRAMUSCULAR; INTRAVENOUS ONCE AS NEEDED
Status: DISCONTINUED | OUTPATIENT
Start: 2025-05-08 | End: 2025-05-10 | Stop reason: HOSPADM

## 2025-05-08 RX ORDER — IBUPROFEN 600 MG/1
600 TABLET, FILM COATED ORAL EVERY 6 HOURS
Status: DISCONTINUED | OUTPATIENT
Start: 2025-05-08 | End: 2025-05-10 | Stop reason: HOSPADM

## 2025-05-08 RX ORDER — OXYTOCIN-SODIUM CHLORIDE 0.9% IV SOLN 30 UNIT/500ML 30-0.9/5 UT/ML-%
95 SOLUTION INTRAVENOUS CONTINUOUS PRN
Status: DISCONTINUED | OUTPATIENT
Start: 2025-05-08 | End: 2025-05-08

## 2025-05-08 RX ORDER — OXYTOCIN 10 [USP'U]/ML
10 INJECTION, SOLUTION INTRAMUSCULAR; INTRAVENOUS ONCE AS NEEDED
Status: DISCONTINUED | OUTPATIENT
Start: 2025-05-08 | End: 2025-05-08

## 2025-05-08 RX ORDER — ONDANSETRON 8 MG/1
8 TABLET, ORALLY DISINTEGRATING ORAL EVERY 8 HOURS PRN
Status: DISCONTINUED | OUTPATIENT
Start: 2025-05-08 | End: 2025-05-08

## 2025-05-08 RX ORDER — OXYTOCIN-SODIUM CHLORIDE 0.9% IV SOLN 30 UNIT/500ML 30-0.9/5 UT/ML-%
10 SOLUTION INTRAVENOUS ONCE AS NEEDED
Status: DISCONTINUED | OUTPATIENT
Start: 2025-05-08 | End: 2025-05-10 | Stop reason: HOSPADM

## 2025-05-08 RX ORDER — SODIUM CHLORIDE 0.9 % (FLUSH) 0.9 %
10 SYRINGE (ML) INJECTION
Status: DISCONTINUED | OUTPATIENT
Start: 2025-05-08 | End: 2025-05-10 | Stop reason: HOSPADM

## 2025-05-08 RX ORDER — SIMETHICONE 80 MG
1 TABLET,CHEWABLE ORAL EVERY 6 HOURS PRN
Status: DISCONTINUED | OUTPATIENT
Start: 2025-05-08 | End: 2025-05-10 | Stop reason: HOSPADM

## 2025-05-08 RX ORDER — HYDROCORTISONE 25 MG/G
CREAM TOPICAL 3 TIMES DAILY PRN
Status: DISCONTINUED | OUTPATIENT
Start: 2025-05-08 | End: 2025-05-10 | Stop reason: HOSPADM

## 2025-05-08 RX ORDER — OXYTOCIN-SODIUM CHLORIDE 0.9% IV SOLN 30 UNIT/500ML 30-0.9/5 UT/ML-%
95 SOLUTION INTRAVENOUS CONTINUOUS PRN
Status: DISCONTINUED | OUTPATIENT
Start: 2025-05-08 | End: 2025-05-10 | Stop reason: HOSPADM

## 2025-05-08 RX ORDER — METHYLERGONOVINE MALEATE 0.2 MG/ML
200 INJECTION INTRAVENOUS ONCE AS NEEDED
Status: DISCONTINUED | OUTPATIENT
Start: 2025-05-08 | End: 2025-05-08

## 2025-05-08 RX ORDER — CARBOPROST TROMETHAMINE 250 UG/ML
250 INJECTION, SOLUTION INTRAMUSCULAR
Status: DISCONTINUED | OUTPATIENT
Start: 2025-05-08 | End: 2025-05-10 | Stop reason: HOSPADM

## 2025-05-08 RX ORDER — SIMETHICONE 80 MG
1 TABLET,CHEWABLE ORAL 4 TIMES DAILY PRN
Status: DISCONTINUED | OUTPATIENT
Start: 2025-05-08 | End: 2025-05-08

## 2025-05-08 RX ORDER — LIDOCAINE HYDROCHLORIDE 10 MG/ML
10 INJECTION, SOLUTION EPIDURAL; INFILTRATION; INTRACAUDAL; PERINEURAL ONCE AS NEEDED
Status: DISCONTINUED | OUTPATIENT
Start: 2025-05-08 | End: 2025-05-08

## 2025-05-08 RX ORDER — HYDROCODONE BITARTRATE AND ACETAMINOPHEN 5; 325 MG/1; MG/1
1 TABLET ORAL EVERY 4 HOURS PRN
Status: DISCONTINUED | OUTPATIENT
Start: 2025-05-08 | End: 2025-05-10 | Stop reason: HOSPADM

## 2025-05-08 RX ORDER — HYDROCODONE BITARTRATE AND ACETAMINOPHEN 7.5; 325 MG/1; MG/1
1 TABLET ORAL EVERY 4 HOURS PRN
Status: DISCONTINUED | OUTPATIENT
Start: 2025-05-08 | End: 2025-05-10 | Stop reason: HOSPADM

## 2025-05-08 RX ORDER — OXYTOCIN-SODIUM CHLORIDE 0.9% IV SOLN 30 UNIT/500ML 30-0.9/5 UT/ML-%
0-32 SOLUTION INTRAVENOUS CONTINUOUS
Status: DISCONTINUED | OUTPATIENT
Start: 2025-05-08 | End: 2025-05-08

## 2025-05-08 RX ORDER — DIPHENOXYLATE HYDROCHLORIDE AND ATROPINE SULFATE 2.5; .025 MG/1; MG/1
2 TABLET ORAL EVERY 6 HOURS PRN
Status: DISCONTINUED | OUTPATIENT
Start: 2025-05-08 | End: 2025-05-10 | Stop reason: HOSPADM

## 2025-05-08 RX ORDER — ACETAMINOPHEN 325 MG/1
650 TABLET ORAL EVERY 6 HOURS SCHEDULED
Status: DISCONTINUED | OUTPATIENT
Start: 2025-05-08 | End: 2025-05-10 | Stop reason: HOSPADM

## 2025-05-08 RX ORDER — DIPHENHYDRAMINE HYDROCHLORIDE 50 MG/ML
25 INJECTION, SOLUTION INTRAMUSCULAR; INTRAVENOUS EVERY 4 HOURS PRN
Status: DISCONTINUED | OUTPATIENT
Start: 2025-05-08 | End: 2025-05-10 | Stop reason: HOSPADM

## 2025-05-08 RX ORDER — CALCIUM CARBONATE 200(500)MG
500 TABLET,CHEWABLE ORAL 3 TIMES DAILY PRN
Status: DISCONTINUED | OUTPATIENT
Start: 2025-05-08 | End: 2025-05-08

## 2025-05-08 RX ORDER — FLUOXETINE 10 MG/1
10 CAPSULE ORAL DAILY
Status: DISCONTINUED | OUTPATIENT
Start: 2025-05-09 | End: 2025-05-10 | Stop reason: HOSPADM

## 2025-05-08 RX ORDER — DIPHENHYDRAMINE HCL 25 MG
25 CAPSULE ORAL EVERY 4 HOURS PRN
Status: DISCONTINUED | OUTPATIENT
Start: 2025-05-08 | End: 2025-05-10 | Stop reason: HOSPADM

## 2025-05-08 RX ORDER — MUPIROCIN 20 MG/G
OINTMENT TOPICAL
Status: DISCONTINUED | OUTPATIENT
Start: 2025-05-08 | End: 2025-05-08

## 2025-05-08 RX ORDER — OXYTOCIN-SODIUM CHLORIDE 0.9% IV SOLN 30 UNIT/500ML 30-0.9/5 UT/ML-%
10 SOLUTION INTRAVENOUS ONCE AS NEEDED
Status: COMPLETED | OUTPATIENT
Start: 2025-05-08 | End: 2025-05-08

## 2025-05-08 RX ORDER — LANOLIN ALCOHOL/MO/W.PET/CERES
1 CREAM (GRAM) TOPICAL DAILY
Status: DISCONTINUED | OUTPATIENT
Start: 2025-05-09 | End: 2025-05-10 | Stop reason: HOSPADM

## 2025-05-08 RX ORDER — OXYTOCIN-SODIUM CHLORIDE 0.9% IV SOLN 30 UNIT/500ML 30-0.9/5 UT/ML-%
95 SOLUTION INTRAVENOUS ONCE AS NEEDED
Status: DISCONTINUED | OUTPATIENT
Start: 2025-05-08 | End: 2025-05-08

## 2025-05-08 RX ORDER — CARBOPROST TROMETHAMINE 250 UG/ML
250 INJECTION, SOLUTION INTRAMUSCULAR
Status: DISCONTINUED | OUTPATIENT
Start: 2025-05-08 | End: 2025-05-08

## 2025-05-08 RX ORDER — DIPHENOXYLATE HYDROCHLORIDE AND ATROPINE SULFATE 2.5; .025 MG/1; MG/1
2 TABLET ORAL EVERY 6 HOURS PRN
Status: DISCONTINUED | OUTPATIENT
Start: 2025-05-08 | End: 2025-05-08

## 2025-05-08 RX ORDER — PRENATAL WITH FERROUS FUM AND FOLIC ACID 3080; 920; 120; 400; 22; 1.84; 3; 20; 10; 1; 12; 200; 27; 25; 2 [IU]/1; [IU]/1; MG/1; [IU]/1; MG/1; MG/1; MG/1; MG/1; MG/1; MG/1; UG/1; MG/1; MG/1; MG/1; MG/1
1 TABLET ORAL DAILY
Status: DISCONTINUED | OUTPATIENT
Start: 2025-05-09 | End: 2025-05-10 | Stop reason: HOSPADM

## 2025-05-08 RX ORDER — DOCUSATE SODIUM 100 MG/1
200 CAPSULE, LIQUID FILLED ORAL 2 TIMES DAILY PRN
Status: DISCONTINUED | OUTPATIENT
Start: 2025-05-08 | End: 2025-05-10 | Stop reason: HOSPADM

## 2025-05-08 RX ORDER — METHYLERGONOVINE MALEATE 0.2 MG/ML
200 INJECTION INTRAVENOUS ONCE AS NEEDED
Status: DISCONTINUED | OUTPATIENT
Start: 2025-05-08 | End: 2025-05-10 | Stop reason: HOSPADM

## 2025-05-08 RX ORDER — OXYTOCIN-SODIUM CHLORIDE 0.9% IV SOLN 30 UNIT/500ML 30-0.9/5 UT/ML-%
10 SOLUTION INTRAVENOUS ONCE AS NEEDED
Status: DISCONTINUED | OUTPATIENT
Start: 2025-05-08 | End: 2025-05-08

## 2025-05-08 RX ORDER — SODIUM CHLORIDE, SODIUM LACTATE, POTASSIUM CHLORIDE, CALCIUM CHLORIDE 600; 310; 30; 20 MG/100ML; MG/100ML; MG/100ML; MG/100ML
INJECTION, SOLUTION INTRAVENOUS CONTINUOUS
Status: DISCONTINUED | OUTPATIENT
Start: 2025-05-08 | End: 2025-05-08

## 2025-05-08 RX ORDER — MISOPROSTOL 200 UG/1
800 TABLET ORAL ONCE AS NEEDED
Status: DISCONTINUED | OUTPATIENT
Start: 2025-05-08 | End: 2025-05-08

## 2025-05-08 RX ORDER — OXYTOCIN-SODIUM CHLORIDE 0.9% IV SOLN 30 UNIT/500ML 30-0.9/5 UT/ML-%
95 SOLUTION INTRAVENOUS ONCE AS NEEDED
Status: DISCONTINUED | OUTPATIENT
Start: 2025-05-08 | End: 2025-05-10 | Stop reason: HOSPADM

## 2025-05-08 RX ORDER — SODIUM CHLORIDE 9 MG/ML
INJECTION, SOLUTION INTRAVENOUS
Status: DISCONTINUED | OUTPATIENT
Start: 2025-05-08 | End: 2025-05-08

## 2025-05-08 RX ORDER — MISOPROSTOL 200 UG/1
800 TABLET ORAL ONCE AS NEEDED
Status: DISCONTINUED | OUTPATIENT
Start: 2025-05-08 | End: 2025-05-10 | Stop reason: HOSPADM

## 2025-05-08 RX ORDER — ONDANSETRON 8 MG/1
8 TABLET, ORALLY DISINTEGRATING ORAL EVERY 8 HOURS PRN
Status: DISCONTINUED | OUTPATIENT
Start: 2025-05-08 | End: 2025-05-10 | Stop reason: HOSPADM

## 2025-05-08 RX ADMIN — OXYTOCIN-SODIUM CHLORIDE 0.9% IV SOLN 30 UNIT/500ML 10 UNITS: 30-0.9/5 SOLUTION at 02:05

## 2025-05-08 RX ADMIN — HUMAN RHO(D) IMMUNE GLOBULIN 300 MCG: 300 INJECTION, SOLUTION INTRAMUSCULAR at 10:05

## 2025-05-08 RX ADMIN — ACETAMINOPHEN 650 MG: 325 TABLET ORAL at 03:05

## 2025-05-08 RX ADMIN — Medication 4 MILLI-UNITS/MIN: at 09:05

## 2025-05-08 RX ADMIN — ACETAMINOPHEN 650 MG: 325 TABLET ORAL at 11:05

## 2025-05-08 RX ADMIN — SODIUM CHLORIDE, POTASSIUM CHLORIDE, SODIUM LACTATE AND CALCIUM CHLORIDE: 600; 310; 30; 20 INJECTION, SOLUTION INTRAVENOUS at 09:05

## 2025-05-08 RX ADMIN — TRANEXAMIC ACID 1000 MG: 10 INJECTION, SOLUTION INTRAVENOUS at 02:05

## 2025-05-08 RX ADMIN — IBUPROFEN 600 MG: 600 TABLET ORAL at 11:05

## 2025-05-08 RX ADMIN — IBUPROFEN 600 MG: 600 TABLET ORAL at 03:05

## 2025-05-08 NOTE — L&D DELIVERY NOTE
"[facility] - Labor & Delivery  Vaginal Delivery   Operative Note    SUMMARY   Patient standing on the side of the bed, leaning on the bed for support.  Normal spontaneous vaginal delivery of live infant, was placed on mothers abdomen for skin to skin and bulb suctioning performed.  Infant delivered position OA over intact perineum.  Nuchal cord: Yes, cord reduced at perineum.    Spontaneous delivery of placenta and IV pitocin given noting good uterine tone. Prophylactic TXA given.  L labial  laceration noted, hemostatic, did not require repair.  Patient tolerated delivery well. Sponge needle and lap counted correctly x2.      Apgars 8/9    Indications:  (normal spontaneous vaginal delivery)  Pregnancy complicated by: Problem List[1]  Admitting GA: 40w5d    Delivery Information for Girl Mayelin Lynn    Birth information:  YOB: 2024   Time of birth: 2:30 PM   Sex: female   Head Delivery Date/Time: 2025  2:30 PM   Delivery type: Vaginal, Spontaneous   Gestational Age: 40w5d       Delivery Providers    Delivering clinician: Nolvia Denton CNM   Provider Role    Erin Pettit RN Registered Nurse    aMyelin Vance RN Registered Nurse              Measurements    Weight: 3720 g  Weight (lbs): 8 lb 3.2 oz  Length: 48.3 cm  Length (in): 19"  Head circumference: 34.3 cm  Chest circumference: 34.3 cm  Abdominal girth: 31.8 cm         Apgars    Living status: Living  Apgar Component Scores:  1 min.:  5 min.:  10 min.:  15 min.:  20 min.:    Skin color:  0  1       Heart rate:  2  2       Reflex irritability:  2  2       Muscle tone:  2  2       Respiratory effort:  2  2       Total:  8  9       Apgars assigned by: FELY NG         Operative Delivery    Forceps attempted?: No  Vacuum extractor attempted?: No         Shoulder Dystocia    Shoulder dystocia present?: No           Presentation    Presentation: Vertex  Position: Occiput Anterior           Interventions/Resuscitation  " PHYSICAL THERAPY NOTE          Patient Name: Anshul Werner Date: 9/23/2022 09/23/22 8436   PT Last Visit   PT Visit Date 09/23/22   Note Type   Note Type Treatment for insurance authorization   Pain Assessment   Pain Assessment Tool 0-10   Pain Score No Pain   Restrictions/Precautions   Weight Bearing Precautions Per Order No   Other Precautions   (Fall Risk; Multiple lines; Cognitive)   General   Family/Caregiver Present No   Cognition   Overall Cognitive Status Impaired   Arousal/Participation Alert   Following Commands Follows one step commands with increased time or repetition   Subjective   Subjective Agreeable to therapy  Reports her  helps her at home  Bed Mobility   Supine to Sit 3  Moderate assistance   Additional items Assist x 2;HOB elevated; Bedrails; Increased time required;Verbal cues   Transfers   Sit to Stand 3  Moderate assistance   Additional items Assist x 2; Increased time required;Verbal cues   Stand to Sit 3  Moderate assistance   Additional items Increased time required;Verbal cues   Stand pivot 3  Moderate assistance   Additional items Verbal cues; Increased time required   Ambulation/Elevation   Gait pattern Not appropriate; Not tested   Balance   Static Sitting Fair   Dynamic Sitting Fair   Static Standing Poor +   Endurance Deficit   Endurance Deficit Yes   Activity Tolerance   Activity Tolerance Patient limited by fatigue   Exercises   Hip Flexion Sitting;15 reps;AROM; Bilateral   Hip Abduction Sitting;15 reps;AROM; Bilateral   Hip Adduction Sitting;15 reps;AROM; Bilateral   Knee AROM Long Arc Quad Sitting;15 reps;AROM; Bilateral   Ankle Pumps Sitting;15 reps;AROM; Bilateral   Assessment   Prognosis Fair   Problem List   (Decreased strength; Decreased endurance; Impaired balance; Decreased mobility;  Impaired judgement; Decreased cognition; Decreased safety awareness)   Assessment Pt seen for PT   Method: Bulb Suctioning, Tactile Stimulation, Deep Suctioning       Cord    Vessels: 3 vessels  Complications: Nuchal  Nuchal Intervention: reduced  Nuchal Cord Description: loose nuchal cord  Number of Loops: 1  Delayed Cord Clamping?: Yes  Cord Clamped Date/Time: 2025  2:32 PM  Cord Blood Disposition: Lab  Gases Sent?: No  Stem Cell Collection (by MD): No       Placenta    Placenta delivery date/time: 2025 14:39  Placenta removal: Spontaneous  Placenta appearance: Intact  Placenta disposition: Discarded           Labor Events:       labor: No     Labor Onset Date/Time: 2025 03:00     Dilation Complete Date/Time: 2025 14:28     Start Pushing Date/Time: 2025 14:28       Start Pushing Date/Time: 2025 14:28     Rupture Date/Time: 25 1428        Rupture type: SRM (Spontaneous Rupture)        Fluid Amount:       Fluid Color: Meconium Thin              steroids: None     Antibiotics given for GBS: No     Induction: oxytocin     Indications for induction:        Augmentation:       Indications for augmentation:       Labor complications: None     Additional complications:          Cervical ripening:                     Delivery:      Episiotomy: None     Indication for Episiotomy:       Perineal Lacerations: None Repaired:      Periurethral Laceration:   Repaired:     Labial Laceration: left Repaired: No   Sulcus Laceration:   Repaired:     Vaginal Laceration:   Repaired:     Cervical Laceration:   Repaired:     Repair suture: None     Repair # of packets: 0     Last Value - EBL - Nursing (mL):       Sum - EBL - Nursing (mL): 0     Last Value - EBL - Anesthesia (mL):      Calculated QBL (mL): 400     Running total QBL (mL): 400     Vaginal Sweep Performed: No     Surgicount Correct: Yes     Vaginal Packing: No Quantity:       Other providers:       Anesthesia    Method: None          Details (if applicable):  Trial of Labor      Categorization:    treatment session this date with interventions consisting of therapeutic exercise to improve endurance to improve functional mobility and therapeutic activity to improve transfers and increase activity tolerance with functional mobility to decrease fall risk  Pt agreeable to PT treatment session upon arrival, pt found in bed  in no apparent distress  Since previous session, pt has made good progress as evidenced by improved transfers  Barriers during this session include weakness and fatigue  Pt continues to be functioning below baseline level, and remains limited 2* factors listed above and including impaired activity tolerance  Pt prognosis for achieving goals is good, pending pt progress with hospitalization/medical status improvements, and indicated by motivated to participate in therapy and ability to follow cues  PT will continue to see pt during current hospitalization in order to address the deficits listed above and provide interventions consistent w/ POC in effort to achieve goals  Current goals and POC remain appropriate, pt continues to have rehab potential  Pt is in need of continued activity in PT to improve strength balance endurance mobility transfers  with return to maximize LOF  From PT/mobility standpoint, recommendation at time of d/c would be post acute rehab  in order to promote return to PLOF and independence  The patient's AM-PAC Basic Mobility Inpatient Short Form Raw Score is 10  A Raw score of less than or equal to 16 suggests the patient may benefit from discharge to post-acute rehabilitation services  Please also refer to the recommendation of  Physical Therapy for safe discharge planning  Goals   LTG Expiration Date 10/06/22   PT Treatment Day 1   Plan   Treatment/Interventions   (Functional transfer training; LE strengthening/ROM; Elevations; Therapeutic exercise;  Endurance training; Bed mobility; Gait training)   Progress Slow progress, decreased activity tolerance   PT Frequency    Priority:     Indications for :     Incision Type:       Additional  information:  Forceps:    Vacuum:    Breech:    Observed anomalies    Other (Comments):              [1]   Patient Active Problem List  Diagnosis    Rh negative, antepartum     (normal spontaneous vaginal delivery)    Other specified hypothyroidism    Bipolar disorder, current episode mixed, mild    Anemia during pregnancy in third trimester    Encounter for supervision of normal pregnancy in multigravida    Single live birth      3-5x/wk   Recommendation   PT Discharge Recommendation Post acute rehabilitation services   AM-PAC Basic Mobility Inpatient   Turning in Bed Without Bedrails 2   Lying on Back to Sitting on Edge of Flat Bed 2   Moving Bed to Chair 2   Standing Up From Chair 2   Walk in Room 1   Climb 3-5 Stairs 1   Basic Mobility Inpatient Raw Score 10   Turning Head Towards Sound 4   Follow Simple Instructions 3   Low Function Basic Mobility Raw Score 17   Low Function Basic Mobility Standardized Score 27 46   Highest Level Of Mobility   JH-HLM Goal 4: Move to chair/commode   JH-HLM Achieved 4: Move to chair/commode   Education   Education Provided Mobility training   Patient Reinforcement needed;Demonstrates verbal understanding   End of Consult   Patient Position at End of Consult Bedside chair;Bed/Chair alarm activated; All needs within reach   End of Consult Comments discussed POC with PT

## 2025-05-08 NOTE — ASSESSMENT & PLAN NOTE
Continue home meds-prozac   Patient with increased edema increased warmth as well as bilateral lower extremity wounds, likely venous stasis ulcers bilateral lower extremities, however cellulitis initially was a possible concern  Patient received 2 days of IV rocephin  will consult wound care, continue to monitor off IV antibiotics

## 2025-05-08 NOTE — HOSPITAL COURSE
5/8/25 0945  Admit for IOL  Pitocin per protocol  Desires NCB  Anticipate progress and NVD    5/9/25 PPD #1, continue with routine PP care  5/10/25 PPD2: routine PP discharge instructions reviewed.   Please notify if experiencing increased vaginal bleeding. Saturating a pad in 20 min or passage of clots the size of a baseball.    Notify if experiencing dizziness, headache, blurred vision. Please take blood pressure if able. If blood pressure is greater than 140/90 repeat blood pressure again in 15 min.  If still greater than 140/90 notify provider.   Notify provider for signs of postpartum depression such as feelings of being overwhelmed and uninterested in caring for self or baby   Discussed increased risk.   Needs thyroid labs PP

## 2025-05-08 NOTE — H&P
[facility] - Labor & Delivery  Obstetrics  History & Physical    Patient Name: Mayelin Lynn  MRN: [id number]  Admission Date: 2025  Primary Care Provider: Isaura, Primary Doctor    Subjective:     Principal Problem:Post-term pregnancy, 40-42 weeks of gestation    History of Present Illness:  28yo  EGA 40w5d presents to  for scheduled IOL for post dates. Reports she started connie overnight, possible leaking fluid overnight but unsure. Reports good fetal movement. No VB.     Obstetric HPI:  This pregnancy has been complicated by   Post dates  Rh negative  Bipolar  Anemia     OB History    Para Term  AB Living   4 2 2 0 1 2   SAB IAB Ectopic Multiple Live Births   1 0 0 0 2      # Outcome Date GA Lbr Hakan/2nd Weight Sex Type Anes PTL Lv   4 Current            3 Term 22 39w4d   F Vag-Spont EPI  FLAVIO      Complications: Blood pressure abnormally low   2 Term 19 40w5d / 01:31 3.72 kg (8 lb 3.2 oz) F Vag-Spont EPI, Local N FLAVIO      Name: ELIZABETH, GIRL MAYELIN SANCHEZ      Apgar1: 8  Apgar5: 9   1 SAB 14 6w0d            History reviewed. No pertinent past medical history.  Past Surgical History:   Procedure Laterality Date    ORAL ANTRAL FISTULA CLOSURE      TONSILLECTOMY, ADENOIDECTOMY         PTA Medications   Medication Sig    ferrous sulfate 325 (65 FE) MG EC tablet Take 1 tablet (325 mg total) by mouth 3 (three) times daily with meals.    FLUoxetine 10 MG capsule Take 1 capsule (10 mg total) by mouth once daily.    prenatal no115/iron/folic acid (PRENATAL 19 ORAL) Take by mouth.       Review of patient's allergies indicates:  No Known Allergies     Family History    None       Tobacco Use    Smoking status: Former     Current packs/day: 0.50     Types: Cigarettes    Smokeless tobacco: Never   Substance and Sexual Activity    Alcohol use: No    Drug use: No    Sexual activity: Yes     Partners: Male     Birth control/protection: None     Review of Systems   Gastrointestinal:  Positive  for abdominal pain.   Genitourinary:  Negative for vaginal bleeding and vaginal discharge.      Objective:     Vital Signs (Most Recent):  Temp: 98.3 °F (36.8 °C) (05/08/25 1050)  Pulse: 89 (05/08/25 1050)  Resp: 16 (05/08/25 1050)  BP: 108/75 (05/08/25 1050) Vital Signs (24h Range):  Temp:  [97.9 °F (36.6 °C)-98.3 °F (36.8 °C)] 98.3 °F (36.8 °C)  Pulse:  [] 89  Resp:  [16-18] 16  BP: (108-114)/(75-79) 108/75        There is no height or weight on file to calculate BMI.    FHT: Cat 1 (reassuring)  TOCO:  irregular     Physical Exam:   Constitutional: She is oriented to person, place, and time. Vital signs are normal. She appears well-developed and well-nourished. She is cooperative.    HENT:   Head: Normocephalic.      Cardiovascular:  Normal rate.             Pulmonary/Chest: Effort normal.        Abdominal: Soft.   Gravid, non-tender     Genitourinary:    Vagina and uterus normal.             Musculoskeletal: Normal range of motion and moves all extremeties.       Neurological: She is alert and oriented to person, place, and time. She has normal strength.    Skin: Skin is warm and dry. Capillary refill takes less than 2 seconds.    Psychiatric: She has a normal mood and affect. Her speech is normal and behavior is normal. Judgment and thought content normal.        Cervix:  Dilation:  4.5  Effacement:  70  Station: -2  Presentation: Vertex     Significant Labs:  Lab Results   Component Value Date    GROUPTRH O NEG 02/12/2025    HEPBSAG Negative 05/18/2021    STREPBCULT No Group B Streptococcus isolated 05/17/2019       I have personallly reviewed all pertinent lab results from the last 24 hours.  Recent Lab Results         05/08/25  0912        ABO O       Antibody Screen NEG       Gran # (ANC) 7.8       Hematocrit 31.1       Hemoglobin 9.5       HIV 1/2 Ag/Ab Negative       Lymph % 19.0       MCH 22.3       MCHC 30.5       MCV 73       Metamyelocytes 3.0       Mono % 10.0       MPV 9.2       nRBC 0        Platelet Count 289       RBC 4.26       RDW 17.5       Rh Type NEG       Segmented Neutrophil % 68.0       WBC 11.43             Assessment/Plan:     29 y.o. female  at 40w5d for:    * Post-term pregnancy, 40-42 weeks of gestation  25 0945  Admit for IOL  Pitocin per protocol  Desires NCB  Anticipate progress and NVD    Anemia during pregnancy in third trimester  H&H on admit 9..1  Continue iron PP    Bipolar disorder, current episode mixed, mild  Continue home meds-prozac    Rh negative, antepartum  Rhogam workup PP        Nolvia Denton CNM  Obstetrics  [facility] - Labor & Delivery

## 2025-05-08 NOTE — LACTATION NOTE
Lactation rounds- Mother resting in bed with infant latched in cradle on L breast. Mother states infant latched without difficulty and denies pain. Mother states she  her other children for over a year each.     Lactation packet reviewed for days 1-2.  Discussed early feeding cues and encouraged mother to feed baby in response to those cues. Encouraged on demand feedings and skin to skin.  Reviewed normal feeding expectations of 8 or more feedings per 24 hour period, cues that babies use to signal hunger and satiety and cluster feeding. Discussed the adequacy of colostrum and baby belly size for the first 3 days of life along with expected output.     Mother states she received a Medela breast pump through her insurance during this pregnancy.     Mother states understanding and verbalized appropriate recall. Encouraged mother to call for assistance when desired or when infant is showing signs of hunger, contact number provided, mother verbalizes understanding.    Transition nurse, ruben [place].

## 2025-05-08 NOTE — SUBJECTIVE & OBJECTIVE
Obstetric HPI:  This pregnancy has been complicated by   Post dates  Rh negative  Bipolar  Anemia     OB History    Para Term  AB Living   4 2 2 0 1 2   SAB IAB Ectopic Multiple Live Births   1 0 0 0 2      # Outcome Date GA Lbr Hakan/2nd Weight Sex Type Anes PTL Lv   4 Current            3 Term 22 39w4d   F Vag-Spont EPI  FLAVIO      Complications: Blood pressure abnormally low   2 Term 19 40w5d / 01:31 3.72 kg (8 lb 3.2 oz) F Vag-Spont EPI, Local N FLAVIO      Name: RICK JOHNSON      Apgar1: 8  Apgar5: 9   1 SAB 14 6w0d            History reviewed. No pertinent past medical history.  Past Surgical History:   Procedure Laterality Date    ORAL ANTRAL FISTULA CLOSURE      TONSILLECTOMY, ADENOIDECTOMY         PTA Medications   Medication Sig    ferrous sulfate 325 (65 FE) MG EC tablet Take 1 tablet (325 mg total) by mouth 3 (three) times daily with meals.    FLUoxetine 10 MG capsule Take 1 capsule (10 mg total) by mouth once daily.    prenatal no115/iron/folic acid (PRENATAL 19 ORAL) Take by mouth.       Review of patient's allergies indicates:  No Known Allergies     Family History    None       Tobacco Use    Smoking status: Former     Current packs/day: 0.50     Types: Cigarettes    Smokeless tobacco: Never   Substance and Sexual Activity    Alcohol use: No    Drug use: No    Sexual activity: Yes     Partners: Male     Birth control/protection: None     Review of Systems   Gastrointestinal:  Positive for abdominal pain.   Genitourinary:  Negative for vaginal bleeding and vaginal discharge.      Objective:     Vital Signs (Most Recent):  Temp: 98.3 °F (36.8 °C) (25 1050)  Pulse: 89 (25 1050)  Resp: 16 (25 1050)  BP: 108/75 (25 1050) Vital Signs (24h Range):  Temp:  [97.9 °F (36.6 °C)-98.3 °F (36.8 °C)] 98.3 °F (36.8 °C)  Pulse:  [] 89  Resp:  [16-18] 16  BP: (108-114)/(75-79) 108/75        There is no height or weight on file to calculate BMI.    FHT:  Cat 1 (reassuring)  TOCO:  irregular     Physical Exam:   Constitutional: She is oriented to person, place, and time. Vital signs are normal. She appears well-developed and well-nourished. She is cooperative.    HENT:   Head: Normocephalic.      Cardiovascular:  Normal rate.             Pulmonary/Chest: Effort normal.        Abdominal: Soft.   Gravid, non-tender     Genitourinary:    Vagina and uterus normal.             Musculoskeletal: Normal range of motion and moves all extremeties.       Neurological: She is alert and oriented to person, place, and time. She has normal strength.    Skin: Skin is warm and dry. Capillary refill takes less than 2 seconds.    Psychiatric: She has a normal mood and affect. Her speech is normal and behavior is normal. Judgment and thought content normal.        Cervix:  Dilation:  4.5  Effacement:  70  Station: -2  Presentation: Vertex     Significant Labs:  Lab Results   Component Value Date    GROUPTRH O NEG 02/12/2025    HEPBSAG Negative 05/18/2021    STREPBCULT No Group B Streptococcus isolated 05/17/2019       I have personallly reviewed all pertinent lab results from the last 24 hours.  Recent Lab Results         05/08/25  0912        ABO O       Antibody Screen NEG       Gran # (ANC) 7.8       Hematocrit 31.1       Hemoglobin 9.5       HIV 1/2 Ag/Ab Negative       Lymph % 19.0       MCH 22.3       MCHC 30.5       MCV 73       Metamyelocytes 3.0       Mono % 10.0       MPV 9.2       nRBC 0       Platelet Count 289       RBC 4.26       RDW 17.5       Rh Type NEG       Segmented Neutrophil % 68.0       WBC 11.43

## 2025-05-08 NOTE — HPI
30yo  EGA 40w5d presents to LD for scheduled IOL for post dates. Reports she started connie overnight, possible leaking fluid overnight but unsure. Reports good fetal movement. No VB.

## 2025-05-09 PROBLEM — Z34.80 ENCOUNTER FOR SUPERVISION OF NORMAL PREGNANCY IN MULTIGRAVIDA: Status: RESOLVED | Noted: 2025-04-17 | Resolved: 2025-05-09

## 2025-05-09 LAB
RUBV IGG SER-ACNC: 16.5 IU/ML
RUBV IGG SER-IMP: REACTIVE

## 2025-05-09 PROCEDURE — 25000003 PHARM REV CODE 250: Performed by: ADVANCED PRACTICE MIDWIFE

## 2025-05-09 PROCEDURE — 99232 SBSQ HOSP IP/OBS MODERATE 35: CPT | Mod: ,,, | Performed by: ADVANCED PRACTICE MIDWIFE

## 2025-05-09 PROCEDURE — 11000001 HC ACUTE MED/SURG PRIVATE ROOM

## 2025-05-09 RX ADMIN — FLUOXETINE HYDROCHLORIDE 10 MG: 10 CAPSULE ORAL at 08:05

## 2025-05-09 RX ADMIN — IBUPROFEN 600 MG: 600 TABLET ORAL at 12:05

## 2025-05-09 RX ADMIN — IBUPROFEN 600 MG: 600 TABLET ORAL at 05:05

## 2025-05-09 RX ADMIN — FERROUS SULFATE TAB 325 MG (65 MG ELEMENTAL FE) 1 EACH: 325 (65 FE) TAB at 08:05

## 2025-05-09 RX ADMIN — PRENATAL VITAMINS-IRON FUMARATE 27 MG IRON-FOLIC ACID 0.8 MG TABLET 1 TABLET: at 08:05

## 2025-05-09 NOTE — PLAN OF CARE
Patient afebrile and had no falls this shift. Fundus firm without massage and below umbilicus. Bleeding light, no clots passed this shift. Voids spontaneously. Ambulates independently. Pain well controlled with oral pain medication. Vital signs stable at this time. Bonding well with infant; responds to infant cues and participates in infant care. Call light placed within reach; encouraged use if needed.

## 2025-05-09 NOTE — PLAN OF CARE
Patient is progressing well. Patient denies pain. She ambulates in the room and took a bath this morning. She is urinating without difficulty. Fundus is firm and vaginal bleeding is light. She is bonding well with her infant and participates in the infants needs.

## 2025-05-09 NOTE — LACTATION NOTE
This note was copied from a baby's chart.  Lactation Rounds: Upon entering room, nurse finds mother with infant nursing to right breast in cradle position. Mother verbalized no pain or discomfort with feeding. Mother states that she has no concerns at this time. Reviewed day 1 teaching and signs of good attachment with mother. Mother verbalizes understanding of all education and counseling. Opportunity for questions given. Mother denies any further lactation needs or concerns at this time. Discussed lactation availability. Encouraged mother to call for assistance when needs arise.

## 2025-05-09 NOTE — SUBJECTIVE & OBJECTIVE
Interval History:     She is doing well this morning. She is tolerating a regular diet without nausea or vomiting. She is voiding spontaneously. She is ambulating. She has passed flatus, and has not a BM. Vaginal bleeding is mild. She denies fever or chills. Abdominal pain is mild and controlled with oral medications. She Is breastfeeding. She desires circumcision for her male baby: not applicable.    Objective:     Vital Signs (Most Recent):  Temp: 98 °F (36.7 °C) (05/09/25 0741)  Pulse: 78 (05/09/25 0741)  Resp: 18 (05/09/25 0741)  BP: 109/67 (05/09/25 0741)  SpO2: 100 % (05/09/25 0741) Vital Signs (24h Range):  Temp:  [97.9 °F (36.6 °C)-98.3 °F (36.8 °C)] 98 °F (36.7 °C)  Pulse:  [] 78  Resp:  [16-20] 18  SpO2:  [100 %] 100 %  BP: (101-150)/(57-88) 109/67        There is no height or weight on file to calculate BMI.      Intake/Output Summary (Last 24 hours) at 5/9/2025 0742  Last data filed at 5/8/2025 1900  Gross per 24 hour   Intake 333.17 ml   Output 900 ml   Net -566.83 ml         Significant Labs:  Lab Results   Component Value Date    GROUPTRH O NEG 05/08/2025    HEPBSAG Non-Reactive 05/08/2025    STREPBCULT No Group B Streptococcus isolated 05/17/2019     Recent Labs   Lab 05/08/25  0912   HGB 9.5*   HCT 31.1*       I have personallly reviewed all pertinent lab results from the last 24 hours.    Physical Exam:   Constitutional: She is oriented to person, place, and time. She appears well-developed and well-nourished.       Cardiovascular:  Normal rate.             Pulmonary/Chest: Effort normal.                  Musculoskeletal: Normal range of motion and moves all extremeties.       Neurological: She is alert and oriented to person, place, and time.    Skin: Skin is warm and dry.    Psychiatric: She has a normal mood and affect. Her behavior is normal. Judgment and thought content normal.       Review of Systems

## 2025-05-09 NOTE — PLAN OF CARE
[facility] ([facility])  Discharge Assessment    Primary Care Provider: No, Primary Doctor     OB Screen (most recent)       OB Screen - 25 0850          OB SCREEN    Assessment Type Discharge Planning Assessment     Source of Information patient;health record     Received Prenatal Care Yes     Any indications/suspicions for None     Is this a teen pregnancy No     Is the baby in NICU No     Indication for adoption/[facility] No     Indication for DME/post-acute needs No     HIV (+) No     Any congenital  disorders No     Fetal demise/ death No

## 2025-05-09 NOTE — LACTATION NOTE
Lactation rounds: Visited patient at bedside and patient asked to call lactation nurse for next feeding. Lactation consultant name left on whiteboard.    Infants weight loss wnl. Infants intake and output wnl. Reinforced infant feeding & output pattern, cue based feeds & unrestricted access to the breast. Mother states baby started cluster-feeding last night. Instructed mother to feed 8 or more times in 24 hours. Hand expression reviewed and encouraged to hand express colostrum to give to baby if baby seems to not be getting satisfied at breast. Benefits of skin to skin and rooming in discussed. Mother denies any further needs or concerns at this time. Mother verbalizes understanding of education.

## 2025-05-09 NOTE — PROGRESS NOTES
[facility] ([facility])  Obstetrics  Postpartum Progress Note    Patient Name: Mayelin Lynn  MRN: [id number]  Admission Date: 2025  Hospital Length of Stay: 1 days  Attending Physician: Malou Parker,*  Primary Care Provider: Isaura, Primary Doctor    Subjective:     Principal Problem: (normal spontaneous vaginal delivery)    Hospital Course:  25  Admit for IOL  Pitocin per protocol  Desires NCB  Anticipate progress and NVD    25 PPD #1, continue with routine PP care    Interval History:     She is doing well this morning. She is tolerating a regular diet without nausea or vomiting. She is voiding spontaneously. She is ambulating. She has passed flatus, and has not a BM. Vaginal bleeding is mild. She denies fever or chills. Abdominal pain is mild and controlled with oral medications. She Is breastfeeding. She desires circumcision for her male baby: not applicable.    Objective:     Vital Signs (Most Recent):  Temp: 98 °F (36.7 °C) (25)  Pulse: 78 (25)  Resp: 18 (25)  BP: 109/67 (25)  SpO2: 100 % (25) Vital Signs (24h Range):  Temp:  [97.9 °F (36.6 °C)-98.3 °F (36.8 °C)] 98 °F (36.7 °C)  Pulse:  [] 78  Resp:  [16-20] 18  SpO2:  [100 %] 100 %  BP: (101-150)/(57-88) 109/67        There is no height or weight on file to calculate BMI.      Intake/Output Summary (Last 24 hours) at 2025 0742  Last data filed at 2025 1900  Gross per 24 hour   Intake 333.17 ml   Output 900 ml   Net -566.83 ml         Significant Labs:  Lab Results   Component Value Date    GROUPTRH O NEG 2025    HEPBSAG Non-Reactive 2025    STREPBCULT No Group B Streptococcus isolated 2019     Recent Labs   Lab 25  0912   HGB 9.5*   HCT 31.1*       I have personallly reviewed all pertinent lab results from the last 24 hours.    Physical Exam:   Constitutional: She is oriented to person, place, and time. She appears well-developed  and well-nourished.       Cardiovascular:  Normal rate.             Pulmonary/Chest: Effort normal.                  Musculoskeletal: Normal range of motion and moves all extremeties.       Neurological: She is alert and oriented to person, place, and time.    Skin: Skin is warm and dry.    Psychiatric: She has a normal mood and affect. Her behavior is normal. Judgment and thought content normal.       Review of Systems  Assessment/Plan:     29 y.o. female  for:    *  (normal spontaneous vaginal delivery)  25 0945  Admit for IOL  Pitocin per protocol  Desires NCB  Anticipate progress and NVD    Anemia during pregnancy in third trimester  H&H on admit ..1  Continue iron PP    Bipolar disorder, current episode mixed, mild  Continue home meds-prozac    Other specified hypothyroidism  Will need thyroid labs 4-6 weeks PP    Rh negative, antepartum  Rhogam workup PP  Baby B positive        Disposition: As patient meets milestones, will plan to discharge 5/10/25.    Carmelita Delgado CNM  Obstetrics  [facility] ([facility])

## 2025-05-10 VITALS
HEART RATE: 77 BPM | SYSTOLIC BLOOD PRESSURE: 120 MMHG | RESPIRATION RATE: 18 BRPM | DIASTOLIC BLOOD PRESSURE: 73 MMHG | OXYGEN SATURATION: 100 % | TEMPERATURE: 98 F

## 2025-05-10 PROCEDURE — 25000003 PHARM REV CODE 250: Performed by: ADVANCED PRACTICE MIDWIFE

## 2025-05-10 PROCEDURE — 99238 HOSP IP/OBS DSCHRG MGMT 30/<: CPT | Mod: TH,,,

## 2025-05-10 RX ORDER — ACETAMINOPHEN 325 MG/1
650 TABLET ORAL EVERY 6 HOURS
Qty: 60 TABLET | Refills: 0 | Status: SHIPPED | OUTPATIENT
Start: 2025-05-10

## 2025-05-10 RX ORDER — DOCUSATE SODIUM 100 MG/1
200 CAPSULE, LIQUID FILLED ORAL 2 TIMES DAILY PRN
Qty: 60 CAPSULE | Refills: 0 | Status: SHIPPED | OUTPATIENT
Start: 2025-05-10

## 2025-05-10 RX ORDER — IBUPROFEN 600 MG/1
600 TABLET, FILM COATED ORAL EVERY 6 HOURS
Qty: 60 TABLET | Refills: 0 | Status: SHIPPED | OUTPATIENT
Start: 2025-05-10

## 2025-05-10 RX ADMIN — FLUOXETINE HYDROCHLORIDE 10 MG: 10 CAPSULE ORAL at 08:05

## 2025-05-10 RX ADMIN — IBUPROFEN 600 MG: 600 TABLET ORAL at 12:05

## 2025-05-10 RX ADMIN — PRENATAL VITAMINS-IRON FUMARATE 27 MG IRON-FOLIC ACID 0.8 MG TABLET 1 TABLET: at 08:05

## 2025-05-10 RX ADMIN — FERROUS SULFATE TAB 325 MG (65 MG ELEMENTAL FE) 1 EACH: 325 (65 FE) TAB at 08:05

## 2025-05-10 NOTE — PROGRESS NOTES
[facility] ([facility])  Obstetrics  Postpartum Progress Note    Patient Name: Mayelin Lynn  MRN: [id number]  Admission Date: 2025  Hospital Length of Stay: 2 days  Attending Physician: Malou Parker,*  Primary Care Provider: Isaura, Primary Doctor    Subjective:     Principal Problem: (normal spontaneous vaginal delivery)    Hospital Course:  25 0945  Admit for IOL  Pitocin per protocol  Desires NCB  Anticipate progress and NVD    25 PPD #1, continue with routine PP care  5/10/25 PPD2: routine PP discharge instructions reviewed.   Please notify if experiencing increased vaginal bleeding. Saturating a pad in 20 min or passage of clots the size of a baseball.    Notify if experiencing dizziness, headache, blurred vision. Please take blood pressure if able. If blood pressure is greater than 140/90 repeat blood pressure again in 15 min.  If still greater than 140/90 notify provider.   Notify provider for signs of postpartum depression such as feelings of being overwhelmed and uninterested in caring for self or baby   Discussed increased risk.   Needs thyroid labs PP    Interval History:     She is doing well this morning. She is tolerating a regular diet without nausea or vomiting. She is voiding spontaneously. She is ambulating. She has passed flatus, and has not a BM. Vaginal bleeding is mild. She denies fever or chills. Abdominal pain is mild and controlled with oral medications. She Is breastfeeding. She desires circumcision for her male baby: not applicable.    Objective:     Vital Signs (Most Recent):  Temp: 98 °F (36.7 °C) (05/10/25 0807)  Pulse: 77 (05/10/25 0807)  Resp: 18 (05/10/25 0807)  BP: 120/73 (05/10/25 0807)  SpO2: 100 % (25 0741) Vital Signs (24h Range):  Temp:  [97.9 °F (36.6 °C)-98.1 °F (36.7 °C)] 98 °F (36.7 °C)  Pulse:  [] 77  Resp:  [18-19] 18  BP: (111-120)/(61-74) 120/73        There is no height or weight on file to calculate BMI.    No intake or  "output data in the 24 hours ending 05/10/25 1039      Significant Labs:  Lab Results   Component Value Date    GROUPTRH O NEG 2025    HEPBSAG Non-Reactive 2025    STREPBCULT No Group B Streptococcus isolated 2019     No results for input(s): "HGB", "HCT" in the last 48 hours.    I have personallly reviewed all pertinent lab results from the last 24 hours.    Physical Exam:   Constitutional: She is oriented to person, place, and time. She appears well-developed and well-nourished.       Cardiovascular:  Normal rate.             Pulmonary/Chest: Effort normal. No respiratory distress.        Abdominal: Soft.     Genitourinary:    Uterus normal.             Musculoskeletal: Moves all extremeties.       Neurological: She is alert and oriented to person, place, and time.    Skin: Skin is warm and dry.    Psychiatric: She has a normal mood and affect.       Review of Systems  Assessment/Plan:     30 y.o. female  for:    *  (normal spontaneous vaginal delivery)  25 0945  Admit for IOL  Pitocin per protocol  Desires NCB  Anticipate progress and NVD    Anemia during pregnancy in third trimester  H&H on admit 9.31.1  Continue iron PP    Bipolar disorder, current episode mixed, mild  Continue home meds-prozac    Other specified hypothyroidism  Will need thyroid labs 4-6 weeks PP    Rh negative, antepartum  Rhogam workup PP  Baby B positive        Disposition: As patient meets milestones, will plan to discharge today.    Akilah Bejarano CNM  Obstetrics  [facility])      "

## 2025-05-10 NOTE — SUBJECTIVE & OBJECTIVE
"Interval History:     She is doing well this morning. She is tolerating a regular diet without nausea or vomiting. She is voiding spontaneously. She is ambulating. She has passed flatus, and has not a BM. Vaginal bleeding is mild. She denies fever or chills. Abdominal pain is mild and controlled with oral medications. She Is breastfeeding. She desires circumcision for her male baby: not applicable.    Objective:     Vital Signs (Most Recent):  Temp: 98 °F (36.7 °C) (05/10/25 0807)  Pulse: 77 (05/10/25 0807)  Resp: 18 (05/10/25 0807)  BP: 120/73 (05/10/25 0807)  SpO2: 100 % (05/09/25 0741) Vital Signs (24h Range):  Temp:  [97.9 °F (36.6 °C)-98.1 °F (36.7 °C)] 98 °F (36.7 °C)  Pulse:  [] 77  Resp:  [18-19] 18  BP: (111-120)/(61-74) 120/73        There is no height or weight on file to calculate BMI.    No intake or output data in the 24 hours ending 05/10/25 1039      Significant Labs:  Lab Results   Component Value Date    GROUPTRH O NEG 05/08/2025    HEPBSAG Non-Reactive 05/08/2025    STREPBCULT No Group B Streptococcus isolated 05/17/2019     No results for input(s): "HGB", "HCT" in the last 48 hours.    I have personallly reviewed all pertinent lab results from the last 24 hours.    Physical Exam:   Constitutional: She is oriented to person, place, and time. She appears well-developed and well-nourished.       Cardiovascular:  Normal rate.             Pulmonary/Chest: Effort normal. No respiratory distress.        Abdominal: Soft.     Genitourinary:    Uterus normal.             Musculoskeletal: Moves all extremeties.       Neurological: She is alert and oriented to person, place, and time.    Skin: Skin is warm and dry.    Psychiatric: She has a normal mood and affect.       Review of Systems  "

## 2025-05-10 NOTE — DISCHARGE SUMMARY
[facility] ([facility])  Obstetrics  Discharge Summary      Patient Name: Mayelin Lynn  MRN: [id number]  Admission Date: 2025  Hospital Length of Stay: 2 days  Discharge Date and Time: 05/10/2025 10:45 AM  Attending Physician: Malou Parker,*   Discharging Provider: Akilah Bejarano CNM   Primary Care Provider: Isaura, Primary Doctor    HPI: 30yo  EGA 40w5d presents to LD for scheduled IOL for post dates. Reports she started connie overnight, possible leaking fluid overnight but unsure. Reports good fetal movement. No VB.         * No surgery found *     Hospital Course:   25 0945  Admit for IOL  Pitocin per protocol  Desires NCB  Anticipate progress and NVD    25 PPD #1, continue with routine PP care  5/10/25 PPD2: routine PP discharge instructions reviewed.   Please notify if experiencing increased vaginal bleeding. Saturating a pad in 20 min or passage of clots the size of a baseball.    Notify if experiencing dizziness, headache, blurred vision. Please take blood pressure if able. If blood pressure is greater than 140/90 repeat blood pressure again in 15 min.  If still greater than 140/90 notify provider.   Notify provider for signs of postpartum depression such as feelings of being overwhelmed and uninterested in caring for self or baby   Discussed increased risk.   Needs thyroid labs PP         Final Active Diagnoses:    Diagnosis Date Noted POA    PRINCIPAL PROBLEM:   (normal spontaneous vaginal delivery) [O80] 2019 Not Applicable    Single live birth [Z37.0] 2025 Not Applicable    Anemia during pregnancy in third trimester [O99.013] 01/15/2025 Yes    Bipolar disorder, current episode mixed, mild [F31.61] 01/15/2025 Yes    Other specified hypothyroidism [E03.8] 01/15/2025 Yes    Rh negative, antepartum [O26.899, Z67.91] 2018 Yes      Problems Resolved During this Admission:        Significant Diagnostic Studies: Labs: All labs within the past 24 hours  "have been reviewed      Feeding Method: breast    Immunizations       Date Immunization Status Dose Route/Site Given by    25 1606 MMR Incomplete 0.5 mL Subcutaneous/     25 1606 Tdap Incomplete 0.5 mL Intramuscular/     25 2259 Rho (D) Immune Globulin - IM Given 300 mcg Intramuscular/Left Ventrogluteal Iqra Bains RN            Delivery:    Episiotomy: None   Lacerations: None   Repair suture: None   Repair # of packets: 0   Blood loss (ml):       Birth information:  YOB: 2024   Time of birth: 2:30 PM   Sex: female   Delivery type: Vaginal, Spontaneous   Gestational Age: 40w5d     Measurements    Weight: 3720 g  Weight (lbs): 8 lb 3.2 oz  Length: 48.3 cm  Length (in): 19"  Head circumference: 34.3 cm  Chest circumference: 34.3 cm  Abdominal girth: 31.8 cm         Delivery Clinician: Delivery Providers    Delivering clinician: Nolvia Denton CNM   Provider Role    Erin Pettit RN Registered Nurse    aMyelin Vance RN Registered Nurse             Additional  information:  Forceps:    Vacuum:    Breech:    Observed anomalies      Living?:     Apgars    Living status: Living  Apgar Component Scores:  1 min.:  5 min.:  10 min.:  15 min.:  20 min.:    Skin color:  0  1       Heart rate:  2  2       Reflex irritability:  2  2       Muscle tone:  2  2       Respiratory effort:  2  2       Total:  8  9       Apgars assigned by: FELY NG         Placenta: Delivered:       appearance  Pending Diagnostic Studies:       None            Discharged Condition: good    Disposition: Home or Self Care    Follow Up:    Patient Instructions:      Pelvic Rest     Notify your health care provider if you experience any of the following:  temperature >100.4     Notify your health care provider if you experience any of the following:  persistent nausea and vomiting or diarrhea     Notify your health care provider if you experience any of the following:  severe uncontrolled pain "     Notify your health care provider if you experience any of the following:  difficulty breathing or increased cough     Notify your health care provider if you experience any of the following:  severe persistent headache     Notify your health care provider if you experience any of the following:  persistent dizziness, light-headedness, or visual disturbances     Notify your health care provider if you experience any of the following:  increased confusion or weakness     Notify your health care provider if you experience any of the following:   Order Comments: Please notify if experiencing increased vaginal bleeding. Saturating a pad in 20 min or passage of clots the size of a baseball.    Notify if experiencing dizziness, headache, blurred vision. Please take blood pressure if able. If blood pressure is greater than 140/90 repeat blood pressure again in 15 min.  If still greater than 140/90 notify provider.   Notify provider for signs of postpartum depression such as feelings of being overwhelmed and uninterested in caring for self or baby     Activity as tolerated     Medications:  Current Discharge Medication List        START taking these medications    Details   acetaminophen (TYLENOL) 325 MG tablet Take 2 tablets (650 mg total) by mouth every 6 (six) hours.  Qty: 60 tablet, Refills: 0      docusate sodium (COLACE) 100 MG capsule Take 2 capsules (200 mg total) by mouth 2 (two) times daily as needed for Constipation.  Qty: 60 capsule, Refills: 0      ibuprofen (ADVIL,MOTRIN) 600 MG tablet Take 1 tablet (600 mg total) by mouth every 6 (six) hours.  Qty: 60 tablet, Refills: 0           CONTINUE these medications which have NOT CHANGED    Details   ferrous sulfate 325 (65 FE) MG EC tablet Take 1 tablet (325 mg total) by mouth 3 (three) times daily with meals.  Qty: 30 tablet, Refills: 5    Associated Diagnoses: Anemia during pregnancy in third trimester      FLUoxetine 10 MG capsule Take 1 capsule (10 mg total)  by mouth once daily.  Qty: 30 capsule, Refills: 11    Associated Diagnoses: Bipolar disorder, current episode mixed, mild      prenatal no115/iron/folic acid (PRENATAL 19 ORAL) Take by mouth.             Akilah Bejarano CNM  Obstetrics  [facility] ([facility])

## 2025-05-10 NOTE — DISCHARGE INSTRUCTIONS
"Mother Self Care:    Activity: Avoid strenuous exercise and get adequate rest.  No driving until the physician consent given.  Emotional Changes: Most women find birth to be a time of great emotional upheaval.  Sense of loss, mood swings, fatigue, anxiety, and feeling "let down" are common.  If feelings worsen or last more than a week, call your physician.  Breast Care/Breastfeeding: Wear a bra for comfort.  Keep nipples dry and apply your own breast milk or lanolin cream as needed for soreness.  Engorgement can be relieved with warm, moist heat before feedings.  You may also take Ibuprofen.  Breast Care/Bottle Feeding: Wear support bra 24 hours a day for one week.  Avoid stimulation to breasts.  You may use ice packs for discomfort.  Kraig-Care/Vaginal Bleeding: Remember to use your kraig-bottle after urinating.  Your flow will change from red, to pink, to yellow/white color over a period of 2 weeks.  Menstruation will return in 3-8 weeks, or longer if breastfeeding.  Episiotomy Vaginal Delivery: Stitches will dissolve within 10 days to 3 weeks.  Warm baths, tucks, and dermoplast will promote healing.  Avoid bubble baths or strong soaps.  Sexual Activity/Pelvic Rest: No sexual activity, tampons, or douching until your physician gives you consent.  Diet: Continue to eat from the five basic food groups, including plenty of protein, fruits, vegetables, and whole grains.  Limit empty calories and high fat foods.  Drink enough fluids to satisfy thirst and add an extra 500 calories for breastfeeding.  Constipation/Hemorrhoids: Drink plenty of water.  You may take a stool softener or natural laxative (Metamucil). You may use tucks or hemorrhoid ointment and soak in a warm tub.    "What does help look like to you when you go home?"  "Is there any need that you anticipate that we may be able to assist with?"    CALL YOUR OB DOCTOR IF ANY OF THE FOLLOWING OCCURS:  *Heavy bleeding - saturating a pad an hour or passing any large " (2-3 inches in size) blood clots.  *Any pain, redness, or tenderness in lower leg.  *You cannot care for yourself or your baby.  *Any signs of infection-      - Temperature greater than 100.5 degrees F      - Foul smelling vaginal discharge and/or incisional drainage      - Increased episiotomy or incisional pain      - Hot, hard, red or sore area on breast      - Flu-like symptoms      - Any urgency, frequency or burning with urination    Return To the Hospital for further Evaluation:  Headache not relieved by tylenol or ibuprofen  Blurry vision, double vision, seeing spots, or flashing lights  Feeling faint or passing out  Right epigastric pain  Difficulty breathing  Swelling in hands, face, or feet  Any of these symptoms accompanied by nausea/vomiting  Gaining more than 5 pounds in one week  Seizures  These symptoms could be an indication of elevated blood pressure.     For patients that were treated for high blood pressure during pregnancy and or your hospital stay you will need a blood pressure check three days after you leave the hospital. Your nursing staff will assist you in an appointment if needed. If you have Connected Mom you may use your blood pressure cuff and report any readings 140/90 to your provider immediately.       If you have any questions that need to be answered immediately please call the Labor & Delivery Unit at [phone] and ask to speak to a nurse.      Please see [facility] folder for additional information and handouts.

## 2025-05-10 NOTE — LACTATION NOTE
Lactation rounds- Mother sitting in bed with infant swaddled and asleep in bassinet. Mother awaiting discharge education. Mother states nursing continues to go well and denies any problems or pain with latch. Weight loss and output WNL.     Mother anticipates discharge home today. Reviewed signs of good attachment. Reviewed breast massage and compression during feedings and indications for use. Reviewed signs of effective milk transfer and instructed to call pediatrician and lactation if signs not present. Discussed expected feeding and output pattern for days of life 2, 3, 4, & 5+; mother instructed to call pediatrician and lactation if infant is not meeting feeding and output goals.     Reviewed signs of engorgement and expectant management. Reviewed signs of mastitis and instructed mother to call OB provider and lactation if any signs present. Discussed proper use of First Alert Form. Reviewed proper milk handling, collection and storage guidelines. Reviewed nursing diet and nutrition. Discussed resources for medication safety while breastfeeding. Reviewed available outpatient lactation resources.     Mother verbalizes understanding of all education and counseling; she denies any further lactation needs or concerns at this time. Encouraged mother to contact lactation with any questions, concerns, or problems, contact number provided.    Primary nurse, ruben Clemente.

## 2025-05-11 ENCOUNTER — HOSPITAL ENCOUNTER (INPATIENT)
Facility: HOSPITAL | Age: 30
LOS: 1 days | Discharge: HOME | DRG: 776 | End: 2025-05-13
Attending: EMERGENCY MEDICINE | Admitting: OBSTETRICS & GYNECOLOGY
Payer: MEDICAID

## 2025-05-11 DIAGNOSIS — N71.9 ENDOMETRITIS: ICD-10-CM

## 2025-05-11 DIAGNOSIS — Z13.6 SCREENING FOR CARDIOVASCULAR CONDITION: ICD-10-CM

## 2025-05-11 DIAGNOSIS — R50.9 FEVER, UNSPECIFIED FEVER CAUSE: Primary | ICD-10-CM

## 2025-05-11 LAB
ABSOLUTE EOSINOPHIL (OHS): 0.14 K/UL
ABSOLUTE EOSINOPHIL (OHS): 0.19 K/UL
ABSOLUTE MONOCYTE (OHS): 1.16 K/UL (ref 0.3–1)
ABSOLUTE MONOCYTE (OHS): 1.29 K/UL (ref 0.3–1)
ABSOLUTE NEUTROPHIL COUNT (OHS): 16.99 K/UL (ref 1.8–7.7)
ABSOLUTE NEUTROPHIL COUNT (OHS): 17.24 K/UL (ref 1.8–7.7)
ALBUMIN SERPL BCP-MCNC: 2.2 G/DL (ref 3.5–5.2)
ALP SERPL-CCNC: 154 UNIT/L (ref 40–150)
ALT SERPL W/O P-5'-P-CCNC: 11 UNIT/L (ref 10–44)
ANION GAP (OHS): 9 MMOL/L (ref 8–16)
AST SERPL-CCNC: 14 UNIT/L (ref 11–45)
BASOPHILS # BLD AUTO: 0.05 K/UL
BASOPHILS # BLD AUTO: 0.06 K/UL
BASOPHILS NFR BLD AUTO: 0.2 %
BASOPHILS NFR BLD AUTO: 0.3 %
BILIRUB SERPL-MCNC: 0.3 MG/DL (ref 0.1–1)
BUN SERPL-MCNC: 7 MG/DL (ref 6–20)
CALCIUM SERPL-MCNC: 8.2 MG/DL (ref 8.7–10.5)
CHLORIDE SERPL-SCNC: 107 MMOL/L (ref 95–110)
CO2 SERPL-SCNC: 20 MMOL/L (ref 23–29)
CREAT SERPL-MCNC: 0.6 MG/DL (ref 0.5–1.4)
ERYTHROCYTE [DISTWIDTH] IN BLOOD BY AUTOMATED COUNT: 18 % (ref 11.5–14.5)
ERYTHROCYTE [DISTWIDTH] IN BLOOD BY AUTOMATED COUNT: 18.3 % (ref 11.5–14.5)
GFR SERPLBLD CREATININE-BSD FMLA CKD-EPI: >60 ML/MIN/1.73/M2
GLUCOSE SERPL-MCNC: 82 MG/DL (ref 70–110)
HCT VFR BLD AUTO: 24.7 % (ref 37–48.5)
HCT VFR BLD AUTO: 25.5 % (ref 37–48.5)
HGB BLD-MCNC: 7.2 GM/DL (ref 12–16)
HGB BLD-MCNC: 7.7 GM/DL (ref 12–16)
IMM GRANULOCYTES # BLD AUTO: 0.21 K/UL (ref 0–0.04)
IMM GRANULOCYTES # BLD AUTO: 0.26 K/UL (ref 0–0.04)
IMM GRANULOCYTES NFR BLD AUTO: 1 % (ref 0–0.5)
IMM GRANULOCYTES NFR BLD AUTO: 1.3 % (ref 0–0.5)
INFLUENZA A MOLECULAR (OHS): NEGATIVE
INFLUENZA B MOLECULAR (OHS): NEGATIVE
LACTATE SERPL-SCNC: 1 MMOL/L (ref 0.5–2.2)
LYMPHOCYTES # BLD AUTO: 1.27 K/UL (ref 1–4.8)
LYMPHOCYTES # BLD AUTO: 1.48 K/UL (ref 1–4.8)
MCH RBC QN AUTO: 22 PG (ref 27–31)
MCH RBC QN AUTO: 22.3 PG (ref 27–31)
MCHC RBC AUTO-ENTMCNC: 29.1 G/DL (ref 32–36)
MCHC RBC AUTO-ENTMCNC: 30.2 G/DL (ref 32–36)
MCV RBC AUTO: 74 FL (ref 82–98)
MCV RBC AUTO: 76 FL (ref 82–98)
NUCLEATED RBC (/100WBC) (OHS): 0 /100 WBC
NUCLEATED RBC (/100WBC) (OHS): 0 /100 WBC
OHS QRS DURATION: 78 MS
OHS QTC CALCULATION: 441 MS
PLATELET # BLD AUTO: 279 K/UL (ref 150–450)
PLATELET # BLD AUTO: 291 K/UL (ref 150–450)
PMV BLD AUTO: 8.3 FL (ref 9.2–12.9)
PMV BLD AUTO: 8.7 FL (ref 9.2–12.9)
POTASSIUM SERPL-SCNC: 3.7 MMOL/L (ref 3.5–5.1)
PROCALCITONIN SERPL-MCNC: 0.06 NG/ML
PROT SERPL-MCNC: 6.2 GM/DL (ref 6–8.4)
RBC # BLD AUTO: 3.27 M/UL (ref 4–5.4)
RBC # BLD AUTO: 3.46 M/UL (ref 4–5.4)
RELATIVE EOSINOPHIL (OHS): 0.7 %
RELATIVE EOSINOPHIL (OHS): 1 %
RELATIVE LYMPHOCYTE (OHS): 6.4 % (ref 18–48)
RELATIVE LYMPHOCYTE (OHS): 7.3 % (ref 18–48)
RELATIVE MONOCYTE (OHS): 5.8 % (ref 4–15)
RELATIVE MONOCYTE (OHS): 6.3 % (ref 4–15)
RELATIVE NEUTROPHIL (OHS): 84.5 % (ref 38–73)
RELATIVE NEUTROPHIL (OHS): 85.2 % (ref 38–73)
SARS-COV-2 RDRP RESP QL NAA+PROBE: NEGATIVE
SODIUM SERPL-SCNC: 136 MMOL/L (ref 136–145)
WBC # BLD AUTO: 19.93 K/UL (ref 3.9–12.7)
WBC # BLD AUTO: 20.41 K/UL (ref 3.9–12.7)

## 2025-05-11 PROCEDURE — G0378 HOSPITAL OBSERVATION PER HR: HCPCS

## 2025-05-11 PROCEDURE — 63600175 PHARM REV CODE 636 W HCPCS: Mod: JZ,TB | Performed by: OBSTETRICS & GYNECOLOGY

## 2025-05-11 PROCEDURE — 99222 1ST HOSP IP/OBS MODERATE 55: CPT | Mod: ,,, | Performed by: OBSTETRICS & GYNECOLOGY

## 2025-05-11 PROCEDURE — U0002 COVID-19 LAB TEST NON-CDC: HCPCS | Performed by: EMERGENCY MEDICINE

## 2025-05-11 PROCEDURE — 83605 ASSAY OF LACTIC ACID: CPT | Performed by: EMERGENCY MEDICINE

## 2025-05-11 PROCEDURE — 25000003 PHARM REV CODE 250: Performed by: OBSTETRICS & GYNECOLOGY

## 2025-05-11 PROCEDURE — 93010 ELECTROCARDIOGRAM REPORT: CPT | Mod: ,,, | Performed by: INTERNAL MEDICINE

## 2025-05-11 PROCEDURE — 63600175 PHARM REV CODE 636 W HCPCS: Performed by: OBSTETRICS & GYNECOLOGY

## 2025-05-11 PROCEDURE — 36415 COLL VENOUS BLD VENIPUNCTURE: CPT | Performed by: OBSTETRICS & GYNECOLOGY

## 2025-05-11 PROCEDURE — 80053 COMPREHEN METABOLIC PANEL: CPT | Performed by: EMERGENCY MEDICINE

## 2025-05-11 PROCEDURE — 85025 COMPLETE CBC W/AUTO DIFF WBC: CPT | Performed by: EMERGENCY MEDICINE

## 2025-05-11 PROCEDURE — 84145 PROCALCITONIN (PCT): CPT | Performed by: EMERGENCY MEDICINE

## 2025-05-11 PROCEDURE — 87502 INFLUENZA DNA AMP PROBE: CPT | Performed by: EMERGENCY MEDICINE

## 2025-05-11 PROCEDURE — 99285 EMERGENCY DEPT VISIT HI MDM: CPT | Mod: 25

## 2025-05-11 PROCEDURE — 87040 BLOOD CULTURE FOR BACTERIA: CPT | Mod: 91 | Performed by: EMERGENCY MEDICINE

## 2025-05-11 PROCEDURE — 85025 COMPLETE CBC W/AUTO DIFF WBC: CPT | Mod: 91 | Performed by: OBSTETRICS & GYNECOLOGY

## 2025-05-11 PROCEDURE — 93005 ELECTROCARDIOGRAM TRACING: CPT

## 2025-05-11 RX ORDER — MISOPROSTOL 200 UG/1
800 TABLET ORAL ONCE
Status: COMPLETED | OUTPATIENT
Start: 2025-05-11 | End: 2025-05-11

## 2025-05-11 RX ORDER — FLUOXETINE 10 MG/1
10 CAPSULE ORAL DAILY
Status: DISCONTINUED | OUTPATIENT
Start: 2025-05-11 | End: 2025-05-13 | Stop reason: HOSPADM

## 2025-05-11 RX ORDER — SODIUM CHLORIDE 0.9 % (FLUSH) 0.9 %
10 SYRINGE (ML) INJECTION
Status: DISCONTINUED | OUTPATIENT
Start: 2025-05-11 | End: 2025-05-13 | Stop reason: HOSPADM

## 2025-05-11 RX ORDER — ACETAMINOPHEN 500 MG
1000 TABLET ORAL EVERY 8 HOURS PRN
Status: DISCONTINUED | OUTPATIENT
Start: 2025-05-11 | End: 2025-05-13 | Stop reason: HOSPADM

## 2025-05-11 RX ORDER — KETOROLAC TROMETHAMINE 30 MG/ML
30 INJECTION, SOLUTION INTRAMUSCULAR; INTRAVENOUS ONCE
Status: COMPLETED | OUTPATIENT
Start: 2025-05-11 | End: 2025-05-11

## 2025-05-11 RX ORDER — MORPHINE SULFATE 4 MG/ML
4 INJECTION, SOLUTION INTRAMUSCULAR; INTRAVENOUS ONCE
Status: DISCONTINUED | OUTPATIENT
Start: 2025-05-11 | End: 2025-05-11

## 2025-05-11 RX ORDER — DIPHENHYDRAMINE HYDROCHLORIDE 50 MG/ML
25 INJECTION, SOLUTION INTRAMUSCULAR; INTRAVENOUS EVERY 4 HOURS PRN
Status: DISCONTINUED | OUTPATIENT
Start: 2025-05-11 | End: 2025-05-13 | Stop reason: HOSPADM

## 2025-05-11 RX ORDER — SODIUM CHLORIDE, SODIUM LACTATE, POTASSIUM CHLORIDE, CALCIUM CHLORIDE 600; 310; 30; 20 MG/100ML; MG/100ML; MG/100ML; MG/100ML
INJECTION, SOLUTION INTRAVENOUS CONTINUOUS
Status: DISCONTINUED | OUTPATIENT
Start: 2025-05-11 | End: 2025-05-13 | Stop reason: HOSPADM

## 2025-05-11 RX ORDER — TRANEXAMIC ACID 10 MG/ML
1000 INJECTION, SOLUTION INTRAVENOUS ONCE
Status: COMPLETED | OUTPATIENT
Start: 2025-05-11 | End: 2025-05-11

## 2025-05-11 RX ORDER — HYDROCODONE BITARTRATE AND ACETAMINOPHEN 10; 325 MG/1; MG/1
1 TABLET ORAL EVERY 4 HOURS PRN
Status: DISCONTINUED | OUTPATIENT
Start: 2025-05-11 | End: 2025-05-13 | Stop reason: HOSPADM

## 2025-05-11 RX ORDER — HYDROCODONE BITARTRATE AND ACETAMINOPHEN 7.5; 325 MG/1; MG/1
1 TABLET ORAL EVERY 4 HOURS PRN
Status: DISCONTINUED | OUTPATIENT
Start: 2025-05-11 | End: 2025-05-13 | Stop reason: HOSPADM

## 2025-05-11 RX ORDER — ONDANSETRON HYDROCHLORIDE 2 MG/ML
4 INJECTION, SOLUTION INTRAVENOUS EVERY 6 HOURS PRN
Status: DISCONTINUED | OUTPATIENT
Start: 2025-05-11 | End: 2025-05-13 | Stop reason: HOSPADM

## 2025-05-11 RX ORDER — IBUPROFEN 800 MG/1
800 TABLET, FILM COATED ORAL EVERY 6 HOURS
Status: DISCONTINUED | OUTPATIENT
Start: 2025-05-11 | End: 2025-05-13 | Stop reason: HOSPADM

## 2025-05-11 RX ADMIN — SODIUM CHLORIDE, POTASSIUM CHLORIDE, SODIUM LACTATE AND CALCIUM CHLORIDE 1000 ML: 600; 310; 30; 20 INJECTION, SOLUTION INTRAVENOUS at 02:05

## 2025-05-11 RX ADMIN — SODIUM CHLORIDE, POTASSIUM CHLORIDE, SODIUM LACTATE AND CALCIUM CHLORIDE: 600; 310; 30; 20 INJECTION, SOLUTION INTRAVENOUS at 04:05

## 2025-05-11 RX ADMIN — MISOPROSTOL 800 MCG: 200 TABLET ORAL at 08:05

## 2025-05-11 RX ADMIN — AMPICILLIN SODIUM AND SULBACTAM SODIUM 3 G: 2; 1 INJECTION, POWDER, FOR SOLUTION INTRAMUSCULAR; INTRAVENOUS at 08:05

## 2025-05-11 RX ADMIN — TRANEXAMIC ACID 1000 MG: 10 INJECTION, SOLUTION INTRAVENOUS at 02:05

## 2025-05-11 RX ADMIN — IBUPROFEN 800 MG: 800 TABLET, FILM COATED ORAL at 01:05

## 2025-05-11 RX ADMIN — IBUPROFEN 800 MG: 800 TABLET, FILM COATED ORAL at 11:05

## 2025-05-11 RX ADMIN — ACETAMINOPHEN 1000 MG: 500 TABLET ORAL at 06:05

## 2025-05-11 RX ADMIN — KETOROLAC TROMETHAMINE 30 MG: 30 INJECTION, SOLUTION INTRAMUSCULAR; INTRAVENOUS at 02:05

## 2025-05-11 RX ADMIN — SODIUM CHLORIDE, POTASSIUM CHLORIDE, SODIUM LACTATE AND CALCIUM CHLORIDE: 600; 310; 30; 20 INJECTION, SOLUTION INTRAVENOUS at 08:05

## 2025-05-11 RX ADMIN — AMPICILLIN SODIUM AND SULBACTAM SODIUM 3 G: 2; 1 INJECTION, POWDER, FOR SOLUTION INTRAMUSCULAR; INTRAVENOUS at 02:05

## 2025-05-11 NOTE — ED NOTES
RN to BS to ask pt. If she would provide urine specimen whenever possible. Pt. Breast feeding at this time. Pt. Calm and feeding appears to be going well - baby latched well. Pt. States that she attempted to collect urine specimen earlier, but accidentally dropped the cup in the toilet. Pt. States that she will try again when baby is done feeding. Pt. Denies any needs at this time. Call light is in reach. WCTM.

## 2025-05-11 NOTE — ASSESSMENT & PLAN NOTE
Admission for IV antibiotics and observation  Unasyn 1 gram IV q 6 hours  Blood culture pending  Repeat CBC in AM

## 2025-05-11 NOTE — ASSESSMENT & PLAN NOTE
Flu/Covid negative  Most likely source of fever is postpartum endometritis.  Unasyn ordered.  Will continue to monitor.

## 2025-05-11 NOTE — PHARMACY MED REC
"Admission Medication History     The home medication history was taken by Naresh West.    You may go to "Admission" then "Reconcile Home Medications" tabs to review and/or act upon these items.     The home medication list has been updated by the Pharmacy department.   Please read ALL comments highlighted in yellow.   Please address this information as you see fit.    Feel free to contact us if you have any questions or require assistance.      Medications listed below were obtained from: Analytic software- [employer], Medical records, and Patient's pharmacy  PTA Medications   Medication Sig    acetaminophen (TYLENOL) 325 MG tablet Take 2 tablets (650 mg total) by mouth every 6 (six) hours.    docusate sodium (COLACE) 100 MG capsule Take 2 capsules (200 mg total) by mouth 2 (two) times daily as needed for Constipation.    ferrous sulfate 325 (65 FE) MG EC tablet Take 1 tablet (325 mg total) by mouth 3 (three) times daily with meals.    FLUoxetine 10 MG capsule Take 1 capsule (10 mg total) by mouth once daily.    ibuprofen (ADVIL,MOTRIN) 600 MG tablet Take 1 tablet (600 mg total) by mouth every 6 (six) hours.    prenatal no115/iron/folic acid (PRENATAL 19 ORAL) Take by mouth.       Naresh West  [id number]                  .          "

## 2025-05-11 NOTE — H&P
Counts include 234 beds at the [facility] - Emergency Dept.  Obstetrics & Gynecology  History & Physical    Patient Name: Mayelin Lynn  MRN: [id number]  Admission Date: 2025  Primary Care Provider: Isaura, Primary Doctor    Subjective:     Chief Complaint/Reason for Admission: PPD#3 with fever and chills    History of Present Illness:  30 y.o. female  PPD#3 from uncomplicated vaginal delivery.  Patient reports fever, chills, and body aches since last night, as well as moderately heavy bleeding and uterine cramping.  Temp to 101 at home.  No N/V.  No dysuria.  She is breastfeeding her infant.        OB History    Para Term  AB Living   4 3 3 0 1 3   SAB IAB Ectopic Multiple Live Births   1 0 0 0 3      # Outcome Date GA Lbr Hakan/2nd Weight Sex Type Anes PTL Lv   4 Term 25 40w5d 06:12 / 00:02 3.72 kg (8 lb 3.2 oz) F Vag-Spont None N FLAVIO      Name: Allen Lynn      Apgar1: 8  Apgar5: 9   3 Term 22 39w4d   F Vag-Spont EPI  FLAVIO      Complications: Blood pressure abnormally low   2 Term / 40w5d / 01:31 3.72 kg (8 lb 3.2 oz) F Vag-Spont EPI, Local N FLAVIO      Name: ALLEN LYNN      Apgar1: 8  Apgar5: 9   1 SAB 14 6w0d            No past medical history on file.  Past Surgical History:   Procedure Laterality Date    ORAL ANTRAL FISTULA CLOSURE      TONSILLECTOMY, ADENOIDECTOMY             Review of patient's allergies indicates:  No Known Allergies     Family History    None       Tobacco Use    Smoking status: Former     Current packs/day: 0.50     Types: Cigarettes    Smokeless tobacco: Never   Substance and Sexual Activity    Alcohol use: No    Drug use: No    Sexual activity: Yes     Partners: Male     Birth control/protection: None     Review of Systems   Constitutional:  Positive for chills and fever.   Respiratory:  Negative for cough and shortness of breath.    Cardiovascular:  Negative for chest pain.   Gastrointestinal:  Negative for abdominal pain, nausea and vomiting.    Genitourinary:  Positive for pelvic pain and vaginal bleeding. Negative for dysuria.      Objective:     Vital Signs (Most Recent):  Temp: 99.1 °F (37.3 °C) (05/11/25 1136)  Pulse: 110 (05/11/25 1136)  Resp: 18 (05/11/25 1136)  BP: 112/67 (05/11/25 1136)  SpO2: 98 % (05/11/25 1019) Vital Signs (24h Range):  Temp:  [98.2 °F (36.8 °C)-99.1 °F (37.3 °C)] 99.1 °F (37.3 °C)  Pulse:  [105-123] 110  Resp:  [15-18] 18  SpO2:  [98 %] 98 %  BP: (112-122)/(64-67) 112/67     Weight: 75.8 kg (167 lb)  Body mass index is 33.73 kg/m².    No LMP recorded.     Physical Exam:   Constitutional: She is oriented to person, place, and time. She appears well-developed and well-nourished. No distress.       Cardiovascular:  Normal rate.             Pulmonary/Chest: Effort normal.        Abdominal: Soft. She exhibits no distension. There is no abdominal tenderness.             Musculoskeletal: No tenderness or edema.       Neurological: She is alert and oriented to person, place, and time.     Psychiatric: She has a normal mood and affect.        Laboratory:  CBC:   Recent Labs   Lab 05/11/25  1122   WBC 19.93*   RBC 3.46*   HGB 7.7*   HCT 25.5*      MCV 74*   MCH 22.3*   MCHC 30.2*     CMP:   Recent Labs   Lab 05/11/25  1122   GLU 82   CALCIUM 8.2*   ALBUMIN 2.2*   PROT 6.2      K 3.7   CO2 20*      BUN 7   CREATININE 0.6   ALKPHOS 154*   ALT 11   AST 14   BILITOT 0.3       Assessment/Plan:     Renal/  Postpartum endometritis  Admission for IV antibiotics and observation  Unasyn 1 gram IV q 6 hours  Blood culture pending  Repeat CBC in AM    ID  Puerperal fever, postpartum condition  Flu/Covid negative  Most likely source of fever is postpartum endometritis.  Unasyn ordered.  Will continue to monitor.    Oncology  Anemia, postpartum  TXA 1 gram IV ordered.  Will continue to monitor bleeding        Malou Parker MD  Obstetrics & Gynecology  [facility] - Emergency Dept.

## 2025-05-11 NOTE — ED PROVIDER NOTES
"SCRIBE #1 NOTE: I, Heidi Casillas, am scribing for, and in the presence of, Chetan Elaine MD. I have scribed the entire note.       History     Chief Complaint   Patient presents with    Fever     Discharged yesterday after giving birth and pt reports fever, chills, and body aches that started yesterday. Pt reports passing a large blood clot yesterday.      Review of patient's allergies indicates:  No Known Allergies      History of Present Illness     HPI    2025, 10:45 AM  History obtained from the patient and medical records      History of Present Illness: Mayelin Lynn is a 30 y.o. female A1 patient with a PMHx of anemia during pregnancy (third trimester) and bipolar disorder who presents to the Emergency Department for evaluation of generalized illness and vaginal bleeding which began yesterday after she was discharged from the hospital post- of her third baby. Her  took place on 2025. She reports passing a "softball-sized" blood clot yesterday. Symptoms are constant and moderate in severity. No mitigating or exacerbating factors reported. Associated sxs include vaginal discomfort, fever, chills, and generalized body aches. Patient denies any coughing, nausea, vomiting, or diarrhea. No prior Tx specified. No further complaints or concerns at this time.       Arrival mode: Personal Transportation    PCP: Isaura, Primary Doctor        Past Medical History:  No past medical history on file.    Past Surgical History:  Past Surgical History:   Procedure Laterality Date    ORAL ANTRAL FISTULA CLOSURE      TONSILLECTOMY, ADENOIDECTOMY           Family History:  Family History   Adopted: Yes       Social History:  Social History     Tobacco Use    Smoking status: Former     Current packs/day: 0.50     Types: Cigarettes    Smokeless tobacco: Never   Substance and Sexual Activity    Alcohol use: No    Drug use: No    Sexual activity: Yes     Partners: Male     Birth control/protection: None        " "Review of Systems     Review of Systems   Constitutional:  Positive for chills and fever.        (+) generalized body aches   HENT:  Negative for sore throat.    Respiratory:  Negative for cough and shortness of breath.    Cardiovascular:  Negative for chest pain.   Gastrointestinal:  Negative for diarrhea, nausea and vomiting.   Genitourinary:  Positive for vaginal bleeding (passed "softball-sized" blood clot yesterday). Negative for dysuria.        (+) vaginal discomfort associated with bleeding   Musculoskeletal:  Negative for back pain.   Skin:  Negative for rash.   Neurological:  Negative for weakness.   Hematological:  Does not bruise/bleed easily.   All other systems reviewed and are negative.     Physical Exam     Initial Vitals [05/11/25 1019]   BP Pulse Resp Temp SpO2   122/64 (!) 123 15 98.2 °F (36.8 °C) 98 %      MAP       --          Physical Exam  Nursing Notes and Vital Signs Reviewed.  Constitutional: Patient is in no acute distress. Well-developed and well-nourished.  Head: Atraumatic. Normocephalic.  Eyes: PERRL. EOM intact. Conjunctivae are not pale. No scleral icterus.  ENT: Mucous membranes are moist. Oropharynx is clear and symmetric.    Neck: Supple. Full ROM. No lymphadenopathy.  Cardiovascular: Tachycardic. Regular rhythm. No murmurs, rubs, or gallops. Distal pulses are 2+ and symmetric.  Pulmonary/Chest: No respiratory distress. Clear to auscultation bilaterally. No wheezing or rales.  Abdominal: Soft and non-distended.  There is no tenderness.  No rebound, guarding, or rigidity. Good bowel sounds.  Genitourinary: No CVA tenderness  Musculoskeletal: Moves all extremities. No obvious deformities. No edema. No calf tenderness.  Skin: Warm and dry.  Neurological:  Alert, awake, and appropriate.  Normal speech.  No acute focal neurological deficits are appreciated.  Psychiatric: Normal affect. Good eye contact. Appropriate in content.     ED Course   Procedures  ED Vital Signs:  Vitals:    " "05/11/25 1019 05/11/25 1127 05/11/25 1136   BP: 122/64  112/67   Pulse: (!) 123 105 110   Resp: 15  18   Temp: 98.2 °F (36.8 °C)  99.1 °F (37.3 °C)   TempSrc: Oral  Oral   SpO2: 98%     Weight: 75.8 kg (167 lb)     Height: 4' 11" (1.499 m)         Abnormal Lab Results:  Labs Reviewed   COMPREHENSIVE METABOLIC PANEL - Abnormal       Result Value    Sodium 136      Potassium 3.7      Chloride 107      CO2 20 (*)     Glucose 82      BUN 7      Creatinine 0.6      Calcium 8.2 (*)     Protein Total 6.2      Albumin 2.2 (*)     Bilirubin Total 0.3       (*)     AST 14      ALT 11      Anion Gap 9      eGFR >60     CBC WITH DIFFERENTIAL - Abnormal    WBC 19.93 (*)     RBC 3.46 (*)     HGB 7.7 (*)     HCT 25.5 (*)     MCV 74 (*)     MCH 22.3 (*)     MCHC 30.2 (*)     RDW 18.0 (*)     Platelet Count 279      MPV 8.3 (*)     Nucleated RBC 0      Neut % 85.2 (*)     Lymph % 6.4 (*)     Mono % 5.8      Eos % 1.0      Basophil % 0.3      Imm Grans % 1.3 (*)     Neut # 16.99 (*)     Lymph # 1.27      Mono # 1.16 (*)     Eos # 0.19      Baso # 0.06      Imm Grans # 0.26 (*)    INFLUENZA A & B BY MOLECULAR - Normal    INFLUENZA A MOLECULAR Negative      INFLUENZA B MOLECULAR  Negative     LACTIC ACID, PLASMA - Normal    Lactic Acid Level 1.0      Narrative:     Falsely low lactic acid results can be found in samples containing >=13.0 mg/dL total bilirubin and/or >=3.5 mg/dL direct bilirubin.    PROCALCITONIN - Normal    Procalcitonin 0.06     SARS-COV-2 RNA AMPLIFICATION, QUAL - Normal    SARS COV-2 Molecular Negative     CULTURE, BLOOD   CULTURE, BLOOD   CBC W/ AUTO DIFFERENTIAL    Narrative:     The following orders were created for panel order CBC auto differential.  Procedure                               Abnormality         Status                     ---------                               -----------         ------                     CBC with Differential[[id number]]       Abnormal            Final result             "     Please view results for these tests on the individual orders.        All Lab Results:  Results for orders placed or performed during the hospital encounter of 05/11/25   EKG 12-lead    Collection Time: 05/11/25 10:44 AM   Result Value Ref Range    QRS Duration 78 ms    [facility] QTC Calculation 441 ms   Influenza A & B by Molecular    Collection Time: 05/11/25 10:50 AM    Specimen: Nasopharyngeal Swab   Result Value Ref Range    INFLUENZA A MOLECULAR Negative Negative    INFLUENZA B MOLECULAR  Negative Negative   Comprehensive metabolic panel    Collection Time: 05/11/25 11:22 AM   Result Value Ref Range    Sodium 136 136 - 145 mmol/L    Potassium 3.7 3.5 - 5.1 mmol/L    Chloride 107 95 - 110 mmol/L    CO2 20 (L) 23 - 29 mmol/L    Glucose 82 70 - 110 mg/dL    BUN 7 6 - 20 mg/dL    Creatinine 0.6 0.5 - 1.4 mg/dL    Calcium 8.2 (L) 8.7 - 10.5 mg/dL    Protein Total 6.2 6.0 - 8.4 gm/dL    Albumin 2.2 (L) 3.5 - 5.2 g/dL    Bilirubin Total 0.3 0.1 - 1.0 mg/dL     (H) 40 - 150 unit/L    AST 14 11 - 45 unit/L    ALT 11 10 - 44 unit/L    Anion Gap 9 8 - 16 mmol/L    eGFR >60 >60 mL/min/1.73/m2   Lactic acid, plasma #1    Collection Time: 05/11/25 11:22 AM   Result Value Ref Range    Lactic Acid Level 1.0 0.5 - 2.2 mmol/L   Procalcitonin    Collection Time: 05/11/25 11:22 AM   Result Value Ref Range    Procalcitonin 0.06 <0.25 ng/mL   CBC with Differential    Collection Time: 05/11/25 11:22 AM   Result Value Ref Range    WBC 19.93 (H) 3.90 - 12.70 K/uL    RBC 3.46 (L) 4.00 - 5.40 M/uL    HGB 7.7 (L) 12.0 - 16.0 gm/dL    HCT 25.5 (L) 37.0 - 48.5 %    MCV 74 (L) 82 - 98 fL    MCH 22.3 (L) 27.0 - 31.0 pg    MCHC 30.2 (L) 32.0 - 36.0 g/dL    RDW 18.0 (H) 11.5 - 14.5 %    Platelet Count 279 150 - 450 K/uL    MPV 8.3 (L) 9.2 - 12.9 fL    Nucleated RBC 0 <=0 /100 WBC    Neut % 85.2 (H) 38 - 73 %    Lymph % 6.4 (L) 18 - 48 %    Mono % 5.8 4 - 15 %    Eos % 1.0 <=8 %    Basophil % 0.3 <=1.9 %    Imm Grans % 1.3 (H) 0.0 - 0.5 %     Neut # 16.99 (H) 1.8 - 7.7 K/uL    Lymph # 1.27 1 - 4.8 K/uL    Mono # 1.16 (H) 0.3 - 1 K/uL    Eos # 0.19 <=0.5 K/uL    Baso # 0.06 <=0.2 K/uL    Imm Grans # 0.26 (H) 0.00 - 0.04 K/uL   COVID-19 Rapid Screening    Collection Time: 05/11/25 11:41 AM   Result Value Ref Range    SARS COV-2 Molecular Negative Negative       Imaging Results:  Imaging Results              X-Ray Chest AP Portable (Final result)  Result time 05/11/25 11:19:00      Final result by Chapin Sandhu MD (05/11/25 11:19:00)                   Impression:     No acute abnormality.    Finalized on: 5/11/2025 11:19 AM By:  Chapin Sandhu MD  [facility]# [id number]      2025-05-11 11:21:05.553     [facility]               Narrative:    EXAM: XR CHEST AP PORTABLE    CLINICAL HISTORY: Sepsis    FINDINGS:  The heart is normal size.  Lungs are clear.  No pleural fluid or pneumothorax.                                         The EKG was ordered, reviewed, and independently interpreted by the ED provider.  Interpretation time: 10:44  Rate: 113 BPM  Rhythm: sinus tachycardia  Interpretation: Otherwise normal ECG. No STEMI.         The Emergency Provider reviewed the vital signs and test results, which are outlined above.     ED Discussion     12:43 PM: Discussed case with Dr. Malou Parker MD (OB-GYN). Dr. Parker agrees with current care and management of pt and accepts admission.   Admitting Service: OB-GYN  Admitting Physician: Dr. Parker  Admit to: obs    12:46 PM: Re-evaluated pt. I have discussed test results, shared treatment plan, and the need for admission with patient/family/caretaker at bedside. Pt and/or family/caretaker express understanding at this time and agree with all information. All questions answered. Pt/caretaker/family member(s) have no further questions or concerns at this time. Pt is ready for admit.     Medical Decision Making  DDx: fever, endometritis    Amount and/or Complexity of Data Reviewed  Labs: ordered. Decision-making  details documented in ED Course.  Radiology: ordered. Decision-making details documented in ED Course.  ECG/medicine tests: ordered and independent interpretation performed. Decision-making details documented in ED Course.    Risk  Decision regarding hospitalization.                ED Medication(s):  Medications   FLUoxetine capsule 10 mg (has no administration in time range)   sodium chloride 0.9% flush 10 mL (has no administration in time range)   diphenhydrAMINE injection 25 mg (has no administration in time range)   tranexamic acid in NaCl,iso-os IVPB 1,000 mg (has no administration in time range)   ampicillin-sulbactam (UNASYN) 3 g in 0.9% NaCl 100 mL IVPB (MB+) (has no administration in time range)   HYDROcodone-acetaminophen  mg per tablet 1 tablet (has no administration in time range)   HYDROcodone-acetaminophen 7.5-325 mg per tablet 1 tablet (has no administration in time range)   ondansetron injection 4 mg (has no administration in time range)   ibuprofen tablet 800 mg (800 mg Oral Given 5/11/25 5656)   acetaminophen tablet 1,000 mg (has no administration in time range)   lactated ringers bolus 1,000 mL (has no administration in time range)   lactated ringers infusion (has no administration in time range)       New Prescriptions    No medications on file               Scribe Attestation:   Scribe #1: I performed the above scribed service and the documentation accurately describes the services I performed. I attest to the accuracy of the note.     Attending:   Physician Attestation Statement for Scribe #1: I, Chetan Elaine MD, personally performed the services described in this documentation, as scribed by Heidi Casillas, in my presence, and it is both accurate and complete.           Clinical Impression       ICD-10-CM ICD-9-CM   1. Fever, unspecified fever cause  R50.9 780.60   2. Screening for cardiovascular condition  Z13.6 V81.2   3. Endometritis  N71.9 615.9       Disposition:   Disposition:  Placed in Observation  Condition: Stable       Chetan Elaine MD  05/11/25 9120

## 2025-05-11 NOTE — HPI
30 y.o. female  PPD#3 from uncomplicated vaginal delivery.  Patient reports fever, chills, and body aches since last night, as well as moderately heavy bleeding and uterine cramping.  Temp to 101 at home.  No N/V.  No dysuria.  She is breastfeeding her infant.

## 2025-05-11 NOTE — SUBJECTIVE & OBJECTIVE
OB History    Para Term  AB Living   4 3 3 0 1 3   SAB IAB Ectopic Multiple Live Births   1 0 0 0 3      # Outcome Date GA Lbr Hakan/2nd Weight Sex Type Anes PTL Lv   4 Term 25 40w5d 06:12 / 00:02 3.72 kg (8 lb 3.2 oz) F Vag-Spont None N FLAVIO      Name: Allen Lynn      Apgar1: 8  Apgar5: 9   3 Term 22 39w4d   F Vag-Spont EPI  FLAVIO      Complications: Blood pressure abnormally low   2 Term 19 40w5d / 01:31 3.72 kg (8 lb 3.2 oz) F Vag-Spont EPI, Local N FLAVIO      Name: ELIZABETH, ALLEN SANCHEZ      Apgar1: 8  Apgar5: 9   1 SAB 14 6w0d            No past medical history on file.  Past Surgical History:   Procedure Laterality Date    ORAL ANTRAL FISTULA CLOSURE      TONSILLECTOMY, ADENOIDECTOMY             Review of patient's allergies indicates:  No Known Allergies     Family History    None       Tobacco Use    Smoking status: Former     Current packs/day: 0.50     Types: Cigarettes    Smokeless tobacco: Never   Substance and Sexual Activity    Alcohol use: No    Drug use: No    Sexual activity: Yes     Partners: Male     Birth control/protection: None     Review of Systems   Constitutional:  Positive for chills and fever.   Respiratory:  Negative for cough and shortness of breath.    Cardiovascular:  Negative for chest pain.   Gastrointestinal:  Negative for abdominal pain, nausea and vomiting.   Genitourinary:  Positive for pelvic pain and vaginal bleeding. Negative for dysuria.      Objective:     Vital Signs (Most Recent):  Temp: 99.1 °F (37.3 °C) (25 1136)  Pulse: 110 (25 1136)  Resp: 18 (25 1136)  BP: 112/67 (25 1136)  SpO2: 98 % (25 1019) Vital Signs (24h Range):  Temp:  [98.2 °F (36.8 °C)-99.1 °F (37.3 °C)] 99.1 °F (37.3 °C)  Pulse:  [105-123] 110  Resp:  [15-18] 18  SpO2:  [98 %] 98 %  BP: (112-122)/(64-67) 112/67     Weight: 75.8 kg (167 lb)  Body mass index is 33.73 kg/m².    No LMP recorded.     Physical Exam:   Constitutional: She  is oriented to person, place, and time. She appears well-developed and well-nourished. No distress.       Cardiovascular:  Normal rate.             Pulmonary/Chest: Effort normal.        Abdominal: Soft. She exhibits no distension. There is no abdominal tenderness.             Musculoskeletal: No tenderness or edema.       Neurological: She is alert and oriented to person, place, and time.     Psychiatric: She has a normal mood and affect.        Laboratory:  CBC:   Recent Labs   Lab 05/11/25  1122   WBC 19.93*   RBC 3.46*   HGB 7.7*   HCT 25.5*      MCV 74*   MCH 22.3*   MCHC 30.2*     CMP:   Recent Labs   Lab 05/11/25  1122   GLU 82   CALCIUM 8.2*   ALBUMIN 2.2*   PROT 6.2      K 3.7   CO2 20*      BUN 7   CREATININE 0.6   ALKPHOS 154*   ALT 11   AST 14   BILITOT 0.3

## 2025-05-12 LAB
ABSOLUTE EOSINOPHIL (OHS): 0.3 K/UL
ABSOLUTE MONOCYTE (OHS): 1.79 K/UL (ref 0.3–1)
ABSOLUTE NEUTROPHIL COUNT (OHS): 18.3 K/UL (ref 1.8–7.7)
BACTERIA #/AREA URNS AUTO: ABNORMAL /HPF
BASOPHILS # BLD AUTO: 0.08 K/UL
BASOPHILS NFR BLD AUTO: 0.3 %
BILIRUB UR QL STRIP.AUTO: NEGATIVE
CLARITY UR: ABNORMAL
COLOR UR AUTO: YELLOW
ERYTHROCYTE [DISTWIDTH] IN BLOOD BY AUTOMATED COUNT: 18.6 % (ref 11.5–14.5)
GENTAMICIN SERPL-MCNC: 1.7 UG/ML (ref ?–25)
GLUCOSE UR QL STRIP: NEGATIVE
HCT VFR BLD AUTO: 25 % (ref 37–48.5)
HGB BLD-MCNC: 7.2 GM/DL (ref 12–16)
HGB UR QL STRIP: ABNORMAL
HOLD SPECIMEN: NORMAL
HOLD SPECIMEN: NORMAL
HYALINE CASTS UR QL AUTO: 0 /LPF (ref 0–1)
IMM GRANULOCYTES # BLD AUTO: 0.25 K/UL (ref 0–0.04)
IMM GRANULOCYTES NFR BLD AUTO: 1.1 % (ref 0–0.5)
KETONES UR QL STRIP: ABNORMAL
LEUKOCYTE ESTERASE UR QL STRIP: ABNORMAL
LYMPHOCYTES # BLD AUTO: 2.15 K/UL (ref 1–4.8)
MCH RBC QN AUTO: 21.8 PG (ref 27–31)
MCHC RBC AUTO-ENTMCNC: 28.8 G/DL (ref 32–36)
MCV RBC AUTO: 76 FL (ref 82–98)
MICROSCOPIC COMMENT: ABNORMAL
NITRITE UR QL STRIP: NEGATIVE
NUCLEATED RBC (/100WBC) (OHS): 0 /100 WBC
PH UR STRIP: 6 [PH]
PLATELET # BLD AUTO: 263 K/UL (ref 150–450)
PMV BLD AUTO: 8.7 FL (ref 9.2–12.9)
PROT UR QL STRIP: ABNORMAL
RBC # BLD AUTO: 3.31 M/UL (ref 4–5.4)
RBC #/AREA URNS AUTO: >100 /HPF (ref 0–4)
RELATIVE EOSINOPHIL (OHS): 1.3 %
RELATIVE LYMPHOCYTE (OHS): 9.4 % (ref 18–48)
RELATIVE MONOCYTE (OHS): 7.8 % (ref 4–15)
RELATIVE NEUTROPHIL (OHS): 80.1 % (ref 38–73)
SP GR UR STRIP: >=1.03
SQUAMOUS #/AREA URNS AUTO: 2 /HPF
UROBILINOGEN UR STRIP-ACNC: NEGATIVE EU/DL
WBC # BLD AUTO: 22.87 K/UL (ref 3.9–12.7)
WBC #/AREA URNS AUTO: >100 /HPF (ref 0–5)
WBC CLUMPS UR QL AUTO: ABNORMAL

## 2025-05-12 PROCEDURE — 99233 SBSQ HOSP IP/OBS HIGH 50: CPT | Mod: ,,, | Performed by: STUDENT IN AN ORGANIZED HEALTH CARE EDUCATION/TRAINING PROGRAM

## 2025-05-12 PROCEDURE — 25000003 PHARM REV CODE 250: Performed by: OBSTETRICS & GYNECOLOGY

## 2025-05-12 PROCEDURE — 87086 URINE CULTURE/COLONY COUNT: CPT | Performed by: OBSTETRICS & GYNECOLOGY

## 2025-05-12 PROCEDURE — 25000003 PHARM REV CODE 250: Performed by: STUDENT IN AN ORGANIZED HEALTH CARE EDUCATION/TRAINING PROGRAM

## 2025-05-12 PROCEDURE — 85025 COMPLETE CBC W/AUTO DIFF WBC: CPT | Performed by: OBSTETRICS & GYNECOLOGY

## 2025-05-12 PROCEDURE — 36415 COLL VENOUS BLD VENIPUNCTURE: CPT | Performed by: OBSTETRICS & GYNECOLOGY

## 2025-05-12 PROCEDURE — 81001 URINALYSIS AUTO W/SCOPE: CPT | Performed by: OBSTETRICS & GYNECOLOGY

## 2025-05-12 PROCEDURE — 80170 ASSAY OF GENTAMICIN: CPT | Performed by: OBSTETRICS & GYNECOLOGY

## 2025-05-12 PROCEDURE — 11000001 HC ACUTE MED/SURG PRIVATE ROOM

## 2025-05-12 PROCEDURE — 63600175 PHARM REV CODE 636 W HCPCS: Performed by: OBSTETRICS & GYNECOLOGY

## 2025-05-12 RX ORDER — LOPERAMIDE HYDROCHLORIDE 2 MG/1
2 CAPSULE ORAL 4 TIMES DAILY PRN
Status: DISCONTINUED | OUTPATIENT
Start: 2025-05-12 | End: 2025-05-13 | Stop reason: HOSPADM

## 2025-05-12 RX ADMIN — AMPICILLIN SODIUM AND SULBACTAM SODIUM 3 G: 2; 1 INJECTION, POWDER, FOR SOLUTION INTRAMUSCULAR; INTRAVENOUS at 09:05

## 2025-05-12 RX ADMIN — SODIUM CHLORIDE, POTASSIUM CHLORIDE, SODIUM LACTATE AND CALCIUM CHLORIDE: 600; 310; 30; 20 INJECTION, SOLUTION INTRAVENOUS at 09:05

## 2025-05-12 RX ADMIN — IBUPROFEN 800 MG: 800 TABLET, FILM COATED ORAL at 05:05

## 2025-05-12 RX ADMIN — FLUOXETINE 10 MG: 10 CAPSULE ORAL at 09:05

## 2025-05-12 RX ADMIN — AMPICILLIN SODIUM AND SULBACTAM SODIUM 3 G: 2; 1 INJECTION, POWDER, FOR SOLUTION INTRAMUSCULAR; INTRAVENOUS at 01:05

## 2025-05-12 RX ADMIN — IBUPROFEN 800 MG: 800 TABLET, FILM COATED ORAL at 06:05

## 2025-05-12 RX ADMIN — SODIUM CHLORIDE, POTASSIUM CHLORIDE, SODIUM LACTATE AND CALCIUM CHLORIDE: 600; 310; 30; 20 INJECTION, SOLUTION INTRAVENOUS at 05:05

## 2025-05-12 RX ADMIN — AMPICILLIN SODIUM AND SULBACTAM SODIUM 3 G: 2; 1 INJECTION, POWDER, FOR SOLUTION INTRAMUSCULAR; INTRAVENOUS at 12:05

## 2025-05-12 RX ADMIN — ACETAMINOPHEN 1000 MG: 500 TABLET ORAL at 09:05

## 2025-05-12 RX ADMIN — IBUPROFEN 800 MG: 800 TABLET, FILM COATED ORAL at 12:05

## 2025-05-12 RX ADMIN — LOPERAMIDE HYDROCHLORIDE 2 MG: 2 CAPSULE ORAL at 12:05

## 2025-05-12 RX ADMIN — GENTAMICIN SULFATE 298 MG: 40 INJECTION, SOLUTION INTRAMUSCULAR; INTRAVENOUS at 06:05

## 2025-05-12 NOTE — PROGRESS NOTES
[facility]  Obstetrics & Gynecology  Progress Note    Patient Name: Mayelin Lynn  MRN: [id number]  Admission Date: 2025  Primary Care Provider: No, Primary Doctor  Principal Problem: <principal problem not specified>    Subjective:     HPI:  30 y.o. female  PPD#3 from uncomplicated vaginal delivery.  Patient reports fever, chills, and body aches since last night, as well as moderately heavy bleeding and uterine cramping.  Temp to 101 at home.  No N/V.  No dysuria.  She is breastfeeding her infant.    Interval History:   Reporting diarrhea, otherwise feeling fine. Abdominal tenderness and vaginal bleeding decreased    Scheduled Meds:   ampicillin-sulbactam  3 g Intravenous Q6H    FLUoxetine  10 mg Oral Daily    ibuprofen  800 mg Oral Q6H     Continuous Infusions:   lactated ringers   Intravenous Continuous   Stopped at 25 0656     PRN Meds:  Current Facility-Administered Medications:     acetaminophen, 1,000 mg, Oral, Q8H PRN    diphenhydrAMINE, 25 mg, Intravenous, Q4H PRN    Pharmacy to dose Aminoglycosides consult, , , Once **AND** gentamicin - pharmacy to dose, , Intravenous, pharmacy to manage frequency    HYDROcodone-acetaminophen, 1 tablet, Oral, Q4H PRN    HYDROcodone-acetaminophen, 1 tablet, Oral, Q4H PRN    ondansetron, 4 mg, Intravenous, Q6H PRN    sodium chloride 0.9%, 10 mL, Intravenous, PRN    Review of patient's allergies indicates:  No Known Allergies    Objective:     Vital Signs (Most Recent):  Temp: 97.6 °F (36.4 °C) (25 07)  Pulse: 86 (25 0736)  Resp: 18 (25)  BP: 100/64 (25 0736)  SpO2: 100 % (2536) Vital Signs (24h Range):  Temp:  [97.6 °F (36.4 °C)-100.9 °F (38.3 °C)] 97.6 °F (36.4 °C)  Pulse:  [] 86  Resp:  [15-20] 18  SpO2:  [97 %-100 %] 100 %  BP: ()/(50-72) 100/64     Weight: 75.8 kg (167 lb)  Body mass index is 33.73 kg/m².  No LMP recorded.    I&O (Last 24H):    Intake/Output Summary (Last 24 hours) at 2025  1007  Last data filed at 5/12/2025 0700  Gross per 24 hour   Intake 1905.57 ml   Output 400 ml   Net 1505.57 ml         Laboratory:  Recent Lab Results  (Last 5 results in the past 24 hours)        05/12/25  0438   05/12/25  0427   05/11/25  1928   05/11/25  1141   05/11/25  1122        Influenza A, Molecular               Influenza B, Molecular               Procalcitonin         0.06  Comment: A concentration < 0.25 ng/mL represents a low risk of bacterial infection.  Procalcitonin may not be accurate among patients with localized   infection, recent trauma or major surgery, immunosuppressed state,   invasive fungal infection, renal dysfunction. Decisions regarding   initiation or continuation of antibiotic therapy should not be based   solely on procalcitonin levels.       Albumin         2.2       ALP         154       ALT         11       Anion Gap         9       Appearance, UA   Hazy             AST         14       Bacteria, UA   None             Baso # 0.08     0.05     0.06       Basophil % 0.3     0.2     0.3       Bilirubin (UA)   Negative             BILIRUBIN TOTAL         0.3  Comment: For infants and newborns, interpretation of results should be based   on gestational age, weight and in agreement with clinical   observations.    Premature Infant recommended reference ranges:   0-24 hours:  <8.0 mg/dL   24-48 hours: <12.0 mg/dL   3-5 days:    <15.0 mg/dL   6-29 days:   <15.0 mg/dL       BLOOD CULTURE       No Growth After 6 Hours  [P]   No Growth After 6 Hours  [P]       BUN         7       Calcium         8.2       Chloride         107       CO2         20       Color, UA   Yellow             SARS COV-2 MOLECULAR       Negative  Comment: This test utilizes isothermal nucleic acid amplification technology to detect the SARS-CoV-2 RdRp nucleic acid segment. The analytical sensitivity (limit of detection) is 500 copies/swab.     A POSITIVE result is indicative of the presence of SARS-CoV-2 RNA; clinical  correlation with patient history and other diagnostic information is necessary to determine patient infection status.    A NEGATIVE result means that SARS-CoV-2 nucleic acids are not present above the limit of detection. A NEGATIVE result should be treated as presumptive. It does not rule out the possibility of COVID-19 and should not be the sole basis for treatment decisions.    This test is Food and Drug Administration (FDA) approved.  Performance characteristics of this test has been independently verified by [facility] Department of Pathology and Laboratory Medicine.         Creatinine         0.6       eGFR         >60  Comment: Estimated GFR calculated using the CKD-EPI creatinine (2021) equation.       Eos # 0.30     0.14     0.19       Eos % 1.3     0.7     1.0       Glucose         82       Glucose, UA   Negative             Gran # (ANC) 18.30     17.24     16.99       Hematocrit 25.0     24.7     25.5       Hemoglobin 7.2     7.2     7.7       Extra Tube Hold for add-ons.  Comment: Auto resulted.      Hold for add-ons.  Comment: Auto resulted.                Hyaline Casts, UA   0             Immature Grans (Abs) 0.25  Comment: Mild elevation in immature granulocytes is non specific and can be seen in a variety of conditions including stress response, acute inflammation, trauma and pregnancy. Correlation with other laboratory and clinical findings is essential.     0.21  Comment: Mild elevation in immature granulocytes is non specific and can be seen in a variety of conditions including stress response, acute inflammation, trauma and pregnancy. Correlation with other laboratory and clinical findings is essential.     0.26  Comment: Mild elevation in immature granulocytes is non specific and can be seen in a variety of conditions including stress response, acute inflammation, trauma and pregnancy. Correlation with other laboratory and clinical findings is essential.       Immature Granulocytes  1.1     1.0     1.3       Ketones, UA   Trace             Lactic Acid Level         1.0       Leukocyte Esterase, UA   2+             Lymph # 2.15     1.48     1.27       Lymph % 9.4     7.3     6.4       MCH 21.8     22.0     22.3       MCHC 28.8     29.1     30.2       MCV 76     76     74       Microscopic Comment     Comment: Other formed elements not mentioned in the report are not present in the microscopic examination.             Mono # 1.79     1.29     1.16       Mono % 7.8     6.3     5.8       MPV 8.7     8.7     8.3       Neut % 80.1     84.5     85.2       NITRITE UA   Negative             nRBC 0     0     0       Blood, UA   3+             pH, UA   6.0             Platelet Count 263     291     279       Potassium         3.7       PROTEIN TOTAL         6.2       Protein, UA   2+  Comment: Recommend a 24 hour urine protein or a urine protein/creatinine ratio if globulin induced proteinuria is clinically suspected.             RBC 3.31     3.27     3.46       RBC, UA   >100             RDW 18.6     18.3     18.0       Sodium         136       Spec Grav UA   >=1.030             Squam Epithel, UA   2             Urobilinogen, UA   Negative             WBC, UA   >100             WBC Clumps, UA   Many             WBC 22.87     20.41     19.93                               [P] - Preliminary Result             I have personallly reviewed all pertinent lab results from the last 24 hours.    Diagnostic Results:  Blood culture and urine culture- NGTD     Physical Exam:   Constitutional: She is oriented to person, place, and time. She appears well-developed and well-nourished. No distress.       Cardiovascular:  Normal rate, regular rhythm and normal heart sounds.             Pulmonary/Chest: Effort normal and breath sounds normal.        Abdominal: Soft. Bowel sounds are normal. She exhibits no distension. There is no abdominal tenderness.             Musculoskeletal: Normal range of motion and moves all  extremeties. No tenderness or edema.       Neurological: She is alert and oriented to person, place, and time.    Skin: Skin is warm and dry.    Psychiatric: She has a normal mood and affect. Her behavior is normal. Thought content normal.        Review of Systems   Constitutional:  Negative for appetite change, chills and fatigue.   Gastrointestinal:  Positive for diarrhea. Negative for constipation.       Assessment/Plan:     Anemia, postpartum  S/p TXA 1 gram IV and misoprostol on day of admission  Will continue to monitor bleeding    Puerperal fever, postpartum condition  Flu/Covid negative  Most likely source of fever is postpartum endometritis.  Unasyn ordered.  Will continue to monitor.    Postpartum endometritis  Admission for IV antibiotics and observation  Unasyn q 6 hours, Gentamicin q24  Blood culture pending  Repeat CBC in AM    Bipolar disorder, current episode mixed, mild  Continue home prozac        Charlotte Villarreal MD  Obstetrics & Gynecology  [facility]

## 2025-05-12 NOTE — SUBJECTIVE & OBJECTIVE
Interval History:   Reporting diarrhea, otherwise feeling fine. Abdominal tenderness and vaginal bleeding decreased    Scheduled Meds:   ampicillin-sulbactam  3 g Intravenous Q6H    FLUoxetine  10 mg Oral Daily    ibuprofen  800 mg Oral Q6H     Continuous Infusions:   lactated ringers   Intravenous Continuous   Stopped at 05/12/25 0656     PRN Meds:  Current Facility-Administered Medications:     acetaminophen, 1,000 mg, Oral, Q8H PRN    diphenhydrAMINE, 25 mg, Intravenous, Q4H PRN    Pharmacy to dose Aminoglycosides consult, , , Once **AND** gentamicin - pharmacy to dose, , Intravenous, pharmacy to manage frequency    HYDROcodone-acetaminophen, 1 tablet, Oral, Q4H PRN    HYDROcodone-acetaminophen, 1 tablet, Oral, Q4H PRN    ondansetron, 4 mg, Intravenous, Q6H PRN    sodium chloride 0.9%, 10 mL, Intravenous, PRN    Review of patient's allergies indicates:  No Known Allergies    Objective:     Vital Signs (Most Recent):  Temp: 97.6 °F (36.4 °C) (05/12/25 0736)  Pulse: 86 (05/12/25 0736)  Resp: 18 (05/12/25 0736)  BP: 100/64 (05/12/25 0736)  SpO2: 100 % (05/12/25 0736) Vital Signs (24h Range):  Temp:  [97.6 °F (36.4 °C)-100.9 °F (38.3 °C)] 97.6 °F (36.4 °C)  Pulse:  [] 86  Resp:  [15-20] 18  SpO2:  [97 %-100 %] 100 %  BP: ()/(50-72) 100/64     Weight: 75.8 kg (167 lb)  Body mass index is 33.73 kg/m².  No LMP recorded.    I&O (Last 24H):    Intake/Output Summary (Last 24 hours) at 5/12/2025 1007  Last data filed at 5/12/2025 0700  Gross per 24 hour   Intake 1905.57 ml   Output 400 ml   Net 1505.57 ml         Laboratory:  Recent Lab Results  (Last 5 results in the past 24 hours)        05/12/25  0438   05/12/25  0427   05/11/25  1928   05/11/25  1141   05/11/25  1122        Influenza A, Molecular               Influenza B, Molecular               Procalcitonin         0.06  Comment: A concentration < 0.25 ng/mL represents a low risk of bacterial infection.  Procalcitonin may not be accurate among patients  with localized   infection, recent trauma or major surgery, immunosuppressed state,   invasive fungal infection, renal dysfunction. Decisions regarding   initiation or continuation of antibiotic therapy should not be based   solely on procalcitonin levels.       Albumin         2.2       ALP         154       ALT         11       Anion Gap         9       Appearance, UA   Hazy             AST         14       Bacteria, UA   None             Baso # 0.08     0.05     0.06       Basophil % 0.3     0.2     0.3       Bilirubin (UA)   Negative             BILIRUBIN TOTAL         0.3  Comment: For infants and newborns, interpretation of results should be based   on gestational age, weight and in agreement with clinical   observations.    Premature Infant recommended reference ranges:   0-24 hours:  <8.0 mg/dL   24-48 hours: <12.0 mg/dL   3-5 days:    <15.0 mg/dL   6-29 days:   <15.0 mg/dL       BLOOD CULTURE       No Growth After 6 Hours  [P]   No Growth After 6 Hours  [P]       BUN         7       Calcium         8.2       Chloride         107       CO2         20       Color, UA   Yellow             SARS COV-2 MOLECULAR       Negative  Comment: This test utilizes isothermal nucleic acid amplification technology to detect the SARS-CoV-2 RdRp nucleic acid segment. The analytical sensitivity (limit of detection) is 500 copies/swab.     A POSITIVE result is indicative of the presence of SARS-CoV-2 RNA; clinical correlation with patient history and other diagnostic information is necessary to determine patient infection status.    A NEGATIVE result means that SARS-CoV-2 nucleic acids are not present above the limit of detection. A NEGATIVE result should be treated as presumptive. It does not rule out the possibility of COVID-19 and should not be the sole basis for treatment decisions.    This test is Food and Drug Administration (FDA) approved.  Performance characteristics of this test has been independently verified by  [facility] Department of Pathology and Laboratory Medicine.         Creatinine         0.6       eGFR         >60  Comment: Estimated GFR calculated using the CKD-EPI creatinine (2021) equation.       Eos # 0.30     0.14     0.19       Eos % 1.3     0.7     1.0       Glucose         82       Glucose, UA   Negative             Gran # (ANC) 18.30     17.24     16.99       Hematocrit 25.0     24.7     25.5       Hemoglobin 7.2     7.2     7.7       Extra Tube Hold for add-ons.  Comment: Auto resulted.      Hold for add-ons.  Comment: Auto resulted.                Hyaline Casts, UA   0             Immature Grans (Abs) 0.25  Comment: Mild elevation in immature granulocytes is non specific and can be seen in a variety of conditions including stress response, acute inflammation, trauma and pregnancy. Correlation with other laboratory and clinical findings is essential.     0.21  Comment: Mild elevation in immature granulocytes is non specific and can be seen in a variety of conditions including stress response, acute inflammation, trauma and pregnancy. Correlation with other laboratory and clinical findings is essential.     0.26  Comment: Mild elevation in immature granulocytes is non specific and can be seen in a variety of conditions including stress response, acute inflammation, trauma and pregnancy. Correlation with other laboratory and clinical findings is essential.       Immature Granulocytes 1.1     1.0     1.3       Ketones, UA   Trace             Lactic Acid Level         1.0       Leukocyte Esterase, UA   2+             Lymph # 2.15     1.48     1.27       Lymph % 9.4     7.3     6.4       MCH 21.8     22.0     22.3       MCHC 28.8     29.1     30.2       MCV 76     76     74       Microscopic Comment     Comment: Other formed elements not mentioned in the report are not present in the microscopic examination.             Mono # 1.79     1.29     1.16       Mono % 7.8     6.3     5.8       MPV 8.7      8.7     8.3       Neut % 80.1     84.5     85.2       NITRITE UA   Negative             nRBC 0     0     0       Blood, UA   3+             pH, UA   6.0             Platelet Count 263     291     279       Potassium         3.7       PROTEIN TOTAL         6.2       Protein, UA   2+  Comment: Recommend a 24 hour urine protein or a urine protein/creatinine ratio if globulin induced proteinuria is clinically suspected.             RBC 3.31     3.27     3.46       RBC, UA   >100             RDW 18.6     18.3     18.0       Sodium         136       Spec Grav UA   >=1.030             Squam Epithel, UA   2             Urobilinogen, UA   Negative             WBC, UA   >100             WBC Clumps, UA   Many             WBC 22.87     20.41     19.93                               [P] - Preliminary Result             I have personallly reviewed all pertinent lab results from the last 24 hours.    Diagnostic Results:  Blood culture and urine culture- NGTD     Physical Exam:   Constitutional: She is oriented to person, place, and time. She appears well-developed and well-nourished. No distress.       Cardiovascular:  Normal rate, regular rhythm and normal heart sounds.             Pulmonary/Chest: Effort normal and breath sounds normal.        Abdominal: Soft. Bowel sounds are normal. She exhibits no distension. There is no abdominal tenderness.             Musculoskeletal: Normal range of motion and moves all extremeties. No tenderness or edema.       Neurological: She is alert and oriented to person, place, and time.    Skin: Skin is warm and dry.    Psychiatric: She has a normal mood and affect. Her behavior is normal. Thought content normal.        Review of Systems   Constitutional:  Negative for appetite change, chills and fatigue.   Gastrointestinal:  Positive for diarrhea. Negative for constipation.

## 2025-05-12 NOTE — PROGRESS NOTES
"Pharmacokinetic Follow Up: Gentamicin    Assessment of levels:   Random concentration of 1.7 mcg/mL (8 hours post-infusion) corresponds to a dosing interval of every 24 hours per the [place] Nomogram    Regimen Plan:   Continue current dose: Gentamicin 298 mg IV every 24 hours    Gentamicin Trough level due 5/13 @0600 (prior to second dose)    Drug levels (last 3 results):  No results for input(s): "AMIKACINPEAK", "AMIKACINTROU", "AMIKACINRAND", "AMIKACIN" in the last 72 hours.    Recent Labs   Lab Result Units 05/12/25  1450   Gentamicin, Random ug/mL 1.7       No results for input(s): "TOBRA8", "TOBRA10", "TOBRA12", "TOBRARND", "TOBRAMYCIN", "TOBRAPEAK", "TOBRATROUGH", "TOBRAMYCINPE", "TOBRAMYCINRA", "TOBRAMYCINTR" in the last 72 hours.    Pharmacy will continue to monitor.    Please contact pharmacy at extension [phone]  with any questions regarding this assessment.    Thank you for the consult,   Cecy Chopra      Patient brief summary:  Mayelin Lynn is a 30 y.o. female initiated on aminoglycoside therapy for treatment of Endometritis    Drug Allergies:   Review of patient's allergies indicates:  No Known Allergies    Actual Body Weight:   75.8 kg    Adjust Body Weight:   59.6 kg    Ideal Body Weight:  48.8 kg    Renal Function:   Estimated Creatinine Clearance: 130.2 mL/min (based on SCr of 0.6 mg/dL).,     Dialysis Method (if applicable):  N/A    CBC (last 72 hours):  Recent Labs   Lab Result Units 05/11/25  1122 05/11/25  1928 05/12/25  0438   WBC K/uL 19.93* 20.41* 22.87*   HGB gm/dL 7.7* 7.2* 7.2*   HCT % 25.5* 24.7* 25.0*   Platelet Count K/uL 279 291 263   Lymph % % 6.4* 7.3* 9.4*   Mono % % 5.8 6.3 7.8   Eos % % 1.0 0.7 1.3   Basophil % % 0.3 0.2 0.3       Metabolic Panel (last 72 hours):  Recent Labs   Lab Result Units 05/11/25  1122 05/12/25  0427   Sodium mmol/L 136  --    Potassium mmol/L 3.7  --    Chloride mmol/L 107  --    CO2 mmol/L 20*  --    Glucose mg/dL 82  --    Glucose, UA   --  " Negative   BUN mg/dL 7  --    Creatinine mg/dL 0.6  --    Albumin g/dL 2.2*  --    Bilirubin Total mg/dL 0.3  --    ALP unit/L 154*  --    AST unit/L 14  --    ALT unit/L 11  --        Aminoglycoside Administrations:  aminoglycosides given in last 96 hours                     gentamicin (GARAMYCIN) 298 mg in 0.9% NaCl 100 mL IVPB (mg) 298 mg New Bag 05/12/25 0656                    Microbiologic Results:  Microbiology Results (last 7 days)       Procedure Component Value Units Date/Time    Urine culture [[id number]] Collected: 05/12/25 0427    Order Status: Sent Specimen: Urine Updated: 05/12/25 0447    Blood culture x two cultures. Draw prior to antibiotics. [[id number]]  (Normal) Collected: 05/11/25 1141    Order Status: Completed Specimen: Blood from Peripheral, Forearm, Left Updated: 05/12/25 0302     Blood Culture No Growth After 6 Hours    Blood culture x two cultures. Draw prior to antibiotics. [[id number]]  (Normal) Collected: 05/11/25 1122    Order Status: Completed Specimen: Blood from Peripheral, Antecubital, Left Updated: 05/12/25 0302     Blood Culture No Growth After 6 Hours    Influenza A & B by Molecular [[id number]]  (Normal) Collected: 05/11/25 1050    Order Status: Completed Specimen: Nasopharyngeal Swab Updated: 05/11/25 1149     INFLUENZA A MOLECULAR Negative     INFLUENZA B MOLECULAR  Negative

## 2025-05-12 NOTE — ASSESSMENT & PLAN NOTE
Admission for IV antibiotics and observation  Unasyn q 6 hours, Gentamicin q24  Blood culture pending  Repeat CBC in AM

## 2025-05-12 NOTE — PLAN OF CARE
[facility]  Discharge Assessment    Primary Care Provider: No, Primary Doctor     Discharge Assessment (most recent)       BRIEF DISCHARGE ASSESSMENT - 05/12/25 0942          Discharge Planning    Assessment Type Discharge Planning Brief Assessment     Resource/Environmental Concerns none     Support Systems Spouse/significant other     Assistance Needed Independent     Equipment Currently Used at Home none     Current Living Arrangements home     Patient/Family Anticipates Transition to home with family     Patient/Family Anticipated Services at Transition none     DME Needed Upon Discharge  none     Discharge Plan A Home with family                   Chart review completed. Patient has no d/c needs at this time. Sw to follow up, as needed, for d/c planning purposes.

## 2025-05-12 NOTE — PROGRESS NOTES
Pharmacokinetic Initial Assessment: Gentamicin    Assessment:  Weight utilized for dose calculation: Adjusted  Dosing method utilized: extended interval dosing using the [employer]uk[employer] Nomogram    Plan: Extended interval dosing regimen: Gentamicin 298 mg IV once, followed by a random level to be drawn on 5/12 at 1500, 8-12 hours after the first dose.    Pharmacy will continue to monitor.    Please contact pharmacy at extension [phone] with any questions regarding this assessment.    Thank you for the consult,    Jess Beachson       Patient brief summary:  Mayelin Lynn is a 30 y.o. female initiated on aminoglycoside therapy for treatment of suspected post partum endometritis    Drug Allergies:   Review of patient's allergies indicates:  No Known Allergies    Actual Body Weight:   75.8 kg    Adjust Body Weight:   59.6 kg    Ideal Body Weight:  48.8 kg    Renal Function:   Estimated Creatinine Clearance: 130.2 mL/min (based on SCr of 0.6 mg/dL).,     Dialysis Method (if applicable):  N/A    CBC (last 72 hours):  Recent Labs   Lab Result Units 05/11/25  1122 05/11/25  1928 05/12/25  0438   WBC K/uL 19.93* 20.41* 22.87*   HGB gm/dL 7.7* 7.2* 7.2*   HCT % 25.5* 24.7* 25.0*   Platelet Count K/uL 279 291 263   Lymph % % 6.4* 7.3* 9.4*   Mono % % 5.8 6.3 7.8   Eos % % 1.0 0.7 1.3   Basophil % % 0.3 0.2 0.3       Metabolic Panel (last 72 hours):  Recent Labs   Lab Result Units 05/11/25  1122 05/12/25  0427   Sodium mmol/L 136  --    Potassium mmol/L 3.7  --    Chloride mmol/L 107  --    CO2 mmol/L 20*  --    Glucose mg/dL 82  --    Glucose, UA   --  Negative   BUN mg/dL 7  --    Creatinine mg/dL 0.6  --    Albumin g/dL 2.2*  --    Bilirubin Total mg/dL 0.3  --    ALP unit/L 154*  --    AST unit/L 14  --    ALT unit/L 11  --        Microbiologic Results:  Microbiology Results (last 7 days)       Procedure Component Value Units Date/Time    Urine culture [[id number]] Collected: 05/12/25 0427    Order Status: Sent Specimen:  Urine Updated: 05/12/25 0447    Blood culture x two cultures. Draw prior to antibiotics. [[id number]]  (Normal) Collected: 05/11/25 1141    Order Status: Completed Specimen: Blood from Peripheral, Forearm, Left Updated: 05/12/25 0302     Blood Culture No Growth After 6 Hours    Blood culture x two cultures. Draw prior to antibiotics. [[id number]]  (Normal) Collected: 05/11/25 1122    Order Status: Completed Specimen: Blood from Peripheral, Antecubital, Left Updated: 05/12/25 0302     Blood Culture No Growth After 6 Hours    Influenza A & B by Molecular [[id number]]  (Normal) Collected: 05/11/25 1050    Order Status: Completed Specimen: Nasopharyngeal Swab Updated: 05/11/25 1149     INFLUENZA A MOLECULAR Negative     INFLUENZA B MOLECULAR  Negative

## 2025-05-12 NOTE — HOSPITAL COURSE
Admission for treatment of endometritis.  Febrile 05/11/25 @ 1919   S/p misoprostol and TXA  HD#2- stable, continue antibiotics\  05/13/2025-last temp 5/11/1919; wishes discharge today  05/13/2025--has remained afebrile, stable for discharge

## 2025-05-12 NOTE — PLAN OF CARE
Patient educated on calling for when able to obtain a urine sample. Patient verbalized understanding. Patient had a max temp of 100.9. PRN medication given per MD order by dayshift nurse. Provider came to assess patient at beginning of shift.IVF infusing per MD order.   Problem: Adult Inpatient Plan of Care  Goal: Plan of Care Review  Outcome: Progressing  Goal: Patient-Specific Goal (Individualized)  Outcome: Progressing  Goal: Absence of Hospital-Acquired Illness or Injury  Outcome: Progressing  Goal: Optimal Comfort and Wellbeing  Outcome: Progressing  Goal: Readiness for Transition of Care  Outcome: Progressing     Problem: Fall Injury Risk  Goal: Absence of Fall and Fall-Related Injury  Outcome: Progressing     Problem: Infection  Goal: Absence of Infection Signs and Symptoms  Outcome: Progressing     Problem: Pain Acute  Goal: Optimal Pain Control and Function  Outcome: Progressing

## 2025-05-13 VITALS
BODY MASS INDEX: 33.67 KG/M2 | SYSTOLIC BLOOD PRESSURE: 120 MMHG | WEIGHT: 167 LBS | TEMPERATURE: 98 F | OXYGEN SATURATION: 99 % | DIASTOLIC BLOOD PRESSURE: 81 MMHG | HEIGHT: 59 IN | RESPIRATION RATE: 16 BRPM | HEART RATE: 82 BPM

## 2025-05-13 PROBLEM — O99.013 ANEMIA DURING PREGNANCY IN THIRD TRIMESTER: Status: RESOLVED | Noted: 2025-01-15 | Resolved: 2025-05-13

## 2025-05-13 LAB
ABSOLUTE EOSINOPHIL (OHS): 0.36 K/UL
ABSOLUTE MONOCYTE (OHS): 0.75 K/UL (ref 0.3–1)
ABSOLUTE NEUTROPHIL COUNT (OHS): 10.7 K/UL (ref 1.8–7.7)
BACTERIA UR CULT: NO GROWTH
BASOPHILS # BLD AUTO: 0.06 K/UL
BASOPHILS NFR BLD AUTO: 0.4 %
ERYTHROCYTE [DISTWIDTH] IN BLOOD BY AUTOMATED COUNT: 18.6 % (ref 11.5–14.5)
GENTAMICIN TROUGH SERPL-MCNC: <0.5 UG/ML
HCT VFR BLD AUTO: 25.4 % (ref 37–48.5)
HGB BLD-MCNC: 7.3 GM/DL (ref 12–16)
IMM GRANULOCYTES # BLD AUTO: 0.18 K/UL (ref 0–0.04)
IMM GRANULOCYTES NFR BLD AUTO: 1.3 % (ref 0–0.5)
LYMPHOCYTES # BLD AUTO: 1.74 K/UL (ref 1–4.8)
MCH RBC QN AUTO: 21.9 PG (ref 27–31)
MCHC RBC AUTO-ENTMCNC: 28.7 G/DL (ref 32–36)
MCV RBC AUTO: 76 FL (ref 82–98)
NUCLEATED RBC (/100WBC) (OHS): 0 /100 WBC
PLATELET # BLD AUTO: 265 K/UL (ref 150–450)
PMV BLD AUTO: 8.8 FL (ref 9.2–12.9)
RBC # BLD AUTO: 3.33 M/UL (ref 4–5.4)
RELATIVE EOSINOPHIL (OHS): 2.6 %
RELATIVE LYMPHOCYTE (OHS): 12.6 % (ref 18–48)
RELATIVE MONOCYTE (OHS): 5.4 % (ref 4–15)
RELATIVE NEUTROPHIL (OHS): 77.7 % (ref 38–73)
WBC # BLD AUTO: 13.79 K/UL (ref 3.9–12.7)

## 2025-05-13 PROCEDURE — 36415 COLL VENOUS BLD VENIPUNCTURE: CPT | Performed by: STUDENT IN AN ORGANIZED HEALTH CARE EDUCATION/TRAINING PROGRAM

## 2025-05-13 PROCEDURE — 63600175 PHARM REV CODE 636 W HCPCS: Performed by: OBSTETRICS & GYNECOLOGY

## 2025-05-13 PROCEDURE — 99238 HOSP IP/OBS DSCHRG MGMT 30/<: CPT | Mod: ,,, | Performed by: OBSTETRICS & GYNECOLOGY

## 2025-05-13 PROCEDURE — 85025 COMPLETE CBC W/AUTO DIFF WBC: CPT | Performed by: STUDENT IN AN ORGANIZED HEALTH CARE EDUCATION/TRAINING PROGRAM

## 2025-05-13 PROCEDURE — 80170 ASSAY OF GENTAMICIN: CPT | Performed by: OBSTETRICS & GYNECOLOGY

## 2025-05-13 PROCEDURE — 25000003 PHARM REV CODE 250: Performed by: OBSTETRICS & GYNECOLOGY

## 2025-05-13 RX ADMIN — AMPICILLIN SODIUM AND SULBACTAM SODIUM 3 G: 2; 1 INJECTION, POWDER, FOR SOLUTION INTRAMUSCULAR; INTRAVENOUS at 08:05

## 2025-05-13 RX ADMIN — IBUPROFEN 800 MG: 800 TABLET, FILM COATED ORAL at 05:05

## 2025-05-13 RX ADMIN — FLUOXETINE 10 MG: 10 CAPSULE ORAL at 08:05

## 2025-05-13 RX ADMIN — SODIUM CHLORIDE, POTASSIUM CHLORIDE, SODIUM LACTATE AND CALCIUM CHLORIDE: 600; 310; 30; 20 INJECTION, SOLUTION INTRAVENOUS at 07:05

## 2025-05-13 RX ADMIN — HYDROCODONE BITARTRATE AND ACETAMINOPHEN 1 TABLET: 7.5; 325 TABLET ORAL at 12:05

## 2025-05-13 RX ADMIN — GENTAMICIN SULFATE 298 MG: 40 INJECTION, SOLUTION INTRAMUSCULAR; INTRAVENOUS at 10:05

## 2025-05-13 RX ADMIN — ACETAMINOPHEN 1000 MG: 500 TABLET ORAL at 07:05

## 2025-05-13 RX ADMIN — AMPICILLIN SODIUM AND SULBACTAM SODIUM 3 G: 2; 1 INJECTION, POWDER, FOR SOLUTION INTRAMUSCULAR; INTRAVENOUS at 01:05

## 2025-05-13 RX ADMIN — AMPICILLIN SODIUM AND SULBACTAM SODIUM 3 G: 2; 1 INJECTION, POWDER, FOR SOLUTION INTRAMUSCULAR; INTRAVENOUS at 03:05

## 2025-05-13 RX ADMIN — SODIUM CHLORIDE, POTASSIUM CHLORIDE, SODIUM LACTATE AND CALCIUM CHLORIDE: 600; 310; 30; 20 INJECTION, SOLUTION INTRAVENOUS at 03:05

## 2025-05-13 NOTE — PROGRESS NOTES
[facility]  Obstetrics & Gynecology  Progress Note    Patient Name: Mayelin Lynn  MRN: [id number]  Admission Date: 2025  Primary Care Provider: Isaura, Primary Doctor  Principal Problem: Postpartum endometritis    Subjective:     HPI:  30 y.o. female  PPD#3 from uncomplicated vaginal delivery.  Patient reports fever, chills, and body aches since last night, as well as moderately heavy bleeding and uterine cramping.  Temp to 101 at home.  No N/V.  No dysuria.  She is breastfeeding her infant.    Interval History:   Patient reports no problems overnight, minimal lochia, no prob void; tolerating regular diet, +ambulation, breast/bottle feeding  Emotionally well      Scheduled Meds:   ampicillin-sulbactam  3 g Intravenous Q6H    FLUoxetine  10 mg Oral Daily    gentamicin  5 mg/kg (Adjusted) Intravenous Q24H    ibuprofen  800 mg Oral Q6H     Continuous Infusions:   lactated ringers   Intravenous Continuous 125 mL/hr at 25 New Bag at 25     PRN Meds:  Current Facility-Administered Medications:     acetaminophen, 1,000 mg, Oral, Q8H PRN    diphenhydrAMINE, 25 mg, Intravenous, Q4H PRN    Pharmacy to dose Aminoglycosides consult, , , Once **AND** gentamicin - pharmacy to dose, , Intravenous, pharmacy to manage frequency    HYDROcodone-acetaminophen, 1 tablet, Oral, Q4H PRN    HYDROcodone-acetaminophen, 1 tablet, Oral, Q4H PRN    loperamide, 2 mg, Oral, QID PRN    ondansetron, 4 mg, Intravenous, Q6H PRN    sodium chloride 0.9%, 10 mL, Intravenous, PRN    Review of patient's allergies indicates:  No Known Allergies    Objective:     Vital Signs (Most Recent):  Temp: 97.7 °F (36.5 °C) (25)  Pulse: 76 (25)  Resp: 18 (25)  BP: 113/72 (25)  SpO2: 98 % (25) Vital Signs (24h Range):  Temp:  [97.7 °F (36.5 °C)-98.5 °F (36.9 °C)] 97.7 °F (36.5 °C)  Pulse:  [76-94] 76  Resp:  [18] 18  SpO2:  [98 %-100 %] 98 %  BP: (108-118)/(71-83) 113/72      Weight: 75.8 kg (167 lb)  Body mass index is 33.73 kg/m².  No LMP recorded.    I&O (Last 24H):    Intake/Output Summary (Last 24 hours) at 5/13/2025 1046  Last data filed at 5/13/2025 0648  Gross per 24 hour   Intake 2702.91 ml   Output --   Net 2702.91 ml         Laboratory:  Recent Lab Results         05/13/25  0634   05/12/25  1450        Baso # 0.06         Basophil % 0.4         Eos # 0.36         Eos % 2.6         Gentamicin, Random   1.7  Comment: Therapeutic range is not established for random specimens.       Gentamicin, Trough <0.5         Gran # (ANC) 10.70         Hematocrit 25.4         Hemoglobin 7.3         Immature Grans (Abs) 0.18  Comment: Mild elevation in immature granulocytes is non specific and can be seen in a variety of conditions including stress response, acute inflammation, trauma and pregnancy. Correlation with other laboratory and clinical findings is essential.         Immature Granulocytes 1.3         Lymph # 1.74         Lymph % 12.6         MCH 21.9         MCHC 28.7         MCV 76         Mono # 0.75         Mono % 5.4         MPV 8.8         Neut % 77.7         nRBC 0         Platelet Count 265         RBC 3.33         RDW 18.6         WBC 13.79               I have personallly reviewed all pertinent lab results from the last 24 hours.    Diagnostic Results:  Labs: Reviewed     Physical Exam:   Constitutional: She is oriented to person, place, and time. She appears well-developed.    HENT:   Head: Normocephalic.    Eyes: Pupils are equal, round, and reactive to light.     Cardiovascular:  Normal rate and regular rhythm.             Pulmonary/Chest: Effort normal and breath sounds normal.        Abdominal: Soft. Bowel sounds are normal. She exhibits no abdominal incision.     Genitourinary:    Genitourinary Comments: Ut--firm, non tender             Musculoskeletal: Normal range of motion.       Neurological: She is alert and oriented to person, place, and time.    Skin: Skin is  warm and dry.    Psychiatric: She has a normal mood and affect. Her behavior is normal. Judgment and thought content normal.        Review of Systems    Assessment/Plan:     * Postpartum endometritis  Admission for IV antibiotics and observation  Unasyn q 6 hours, Gentamicin q24  Blood culture pending  Repeat CBC in AM  05/13/2025  Remains afebrile  Stable for discharge after 1930 if remains afebrile (pt requesting)    Anemia, postpartum  S/p TXA 1 gram IV and misoprostol on day of admission  Will continue to monitor bleeding  05/13/2025  Minimal lochia    Puerperal fever, postpartum condition  Flu/Covid negative  Most likely source of fever is postpartum endometritis.  Unasyn ordered.  Will continue to monitor.    Bipolar disorder, current episode mixed, mild  Continue home prozac        Caty Mccann MD  Obstetrics & Gynecology  [facility]

## 2025-05-13 NOTE — ASSESSMENT & PLAN NOTE
Admission for IV antibiotics and observation  Unasyn q 6 hours, Gentamicin q24  Blood culture pending  Repeat CBC in AM  05/13/2025  Remains afebrile  Stable for discharge after 1930 if remains afebrile (pt requesting)

## 2025-05-13 NOTE — PLAN OF CARE
Problem: Adult Inpatient Plan of Care  Goal: Readiness for Transition of Care  5/13/2025 1841 by Lolita Still RN  Outcome: Met

## 2025-05-13 NOTE — PROGRESS NOTES
Pharmacokinetic Follow Up: Gentamicin    Assessment of levels:   Trough level = < 0.5 mcg/ml    Regimen Plan:   Continue current dose: Gentamicin 298 mg IV every 24 hours    Drug levels (last 3 results):    Next trough due 5/14 @ 0900  Pharmacy will continue to monitor.    Please contact pharmacy at extension [phone] with any questions regarding this assessment.    Thank you for the consult,   Jess Rodriguez [facility]      Patient brief summary:  Mayelin Lynn is a 30 y.o. female initiated on aminoglycoside therapy for treatment of GYN/OB indications (post partum endometritis)     Drug Allergies:   Review of patient's allergies indicates:  No Known Allergies    Actual Body Weight:   75.8 kg    Adjust Body Weight:   59.6 kg    Ideal Body Weight:  48.8 kg    Renal Function:   Estimated Creatinine Clearance: 130.2 mL/min (based on SCr of 0.6 mg/dL).,     Dialysis Method (if applicable):NA      CBC (last 72 hours):  Recent Labs   Lab Result Units 05/11/25  1122 05/11/25  1928 05/12/25  0438 05/13/25  0634   WBC K/uL 19.93* 20.41* 22.87* 13.79*   HGB gm/dL 7.7* 7.2* 7.2* 7.3*   HCT % 25.5* 24.7* 25.0* 25.4*   Platelet Count K/uL 279 291 263 265   Lymph % % 6.4* 7.3* 9.4* 12.6*   Mono % % 5.8 6.3 7.8 5.4   Eos % % 1.0 0.7 1.3 2.6   Basophil % % 0.3 0.2 0.3 0.4       Metabolic Panel (last 72 hours):  Recent Labs   Lab Result Units 05/11/25  1122 05/12/25  0427   Sodium mmol/L 136  --    Potassium mmol/L 3.7  --    Chloride mmol/L 107  --    CO2 mmol/L 20*  --    Glucose mg/dL 82  --    Glucose, UA   --  Negative   BUN mg/dL 7  --    Creatinine mg/dL 0.6  --    Albumin g/dL 2.2*  --    Bilirubin Total mg/dL 0.3  --    ALP unit/L 154*  --    AST unit/L 14  --    ALT unit/L 11  --        Aminoglycoside Administrations:  aminoglycosides given in last 96 hours                     gentamicin (GARAMYCIN) 298 mg in 0.9% NaCl 100 mL IVPB (mg) 298 mg New Bag 05/13/25 1004    gentamicin (GARAMYCIN) 298 mg in 0.9% NaCl 100 mL IVPB (mg) 298  mg New Bag 05/12/25 0656                    Microbiologic Results:  Microbiology Results (last 7 days)       Procedure Component Value Units Date/Time    Blood culture x two cultures. Draw prior to antibiotics. [[id number]]  (Normal) Collected: 05/11/25 1122    Order Status: Completed Specimen: Blood from Peripheral, Antecubital, Left Updated: 05/13/25 0900     Blood Culture No Growth After 36 Hours    Blood culture x two cultures. Draw prior to antibiotics. [[id number]]  (Normal) Collected: 05/11/25 1141    Order Status: Completed Specimen: Blood from Peripheral, Forearm, Left Updated: 05/13/25 0900     Blood Culture No Growth After 36 Hours    Urine culture [[id number]] Collected: 05/12/25 0427    Order Status: Sent Specimen: Urine Updated: 05/12/25 0447    Influenza A & B by Molecular [[id number]]  (Normal) Collected: 05/11/25 1050    Order Status: Completed Specimen: Nasopharyngeal Swab Updated: 05/11/25 1149     INFLUENZA A MOLECULAR Negative     INFLUENZA B MOLECULAR  Negative

## 2025-05-13 NOTE — LACTATION NOTE
Mother request lactation consultant at this time.    Mother is concerned infant is not receiving adequate nutrition at the breast because infant resumes feeding cues within 20-30 minutes of breastfeeding.    24 hour recall and details:  Breast feeds: 'near constantly', 10 minutes on each side, infrequent swallows, falls asleep at the breast, breast slightly soften  Formula fed after previous breastfeeding session, 2 oz, seem satisfied and slept 3 hours  Voids: 5  Stools: 3, last stool was loose, yellow & seedy; stool before that was thick and dark green  Mother reports slight fullness is breast, does slightly soften with feeding  Pumped 1 time when infant received formula bottle, expressed a total of 15 mL in 30 minutes    Of note:  -successful previous breastfeeding experience with no concerns  -was not able to successfully pump and express milk with last infant    Current plan until breastfeeding assessment:  -within the last 10 minutes infant completed a breastfeeding session  -at this time, bottle feed formula until content  -as soon as bottle feeding session is completed, pump and store milk  -rest  -call lactation for full feeding assessment when infant next shows signs of hunger, contact number provided    Breastfeeding assessment to be completed and full plan of care to be developed at that time based on clinical presentation.    Mother verbalizes understanding of all education and counseling. Mother denies any further lactation needs or concerns at this time. Discussed lactation availability. Encouraged mother to call for assistance when cues resume & needs arise.

## 2025-05-13 NOTE — DISCHARGE SUMMARY
"[facility]  Obstetrics & Gynecology  Discharge Summary    Patient Name: Mayelin Lynn  MRN: [id number]  Admission Date: 2025  Hospital Length of Stay: 1 days  Discharge Date and Time: 2025 6:51 PM  Attending Physician: Malou Parker,*   Discharging Provider: Caty Mccann MD  Primary Care Provider: Isaura, Primary Doctor    HPI:  30 y.o. female  PPD#3 from uncomplicated vaginal delivery.  Patient reports fever, chills, and body aches since last night, as well as moderately heavy bleeding and uterine cramping.  Temp to 101 at home.  No N/V.  No dysuria.  She is breastfeeding her infant.    Hospital Course:  Admission for treatment of endometritis.  Febrile 25 @ 1919   S/p misoprostol and TXA  HD#2- stable, continue antibiotics\  2025-last temp 1919; wishes discharge today  2025--has remained afebrile, stable for discharge          Goals of Care Treatment Preferences:  Code Status: Full Code      * No surgery found *     Consults (From admission, onward)          Status Ordering Provider     Inpatient consult to Lactation  Once        Provider:  (Not yet assigned)    Acknowledged MALOU PARKER     Pharmacy to dose Aminoglycosides consult  Once        Provider:  (Not yet assigned)   Placed in "And" Linked Group    Acknowledged MALOU PARKER            Significant Diagnostic Studies: Labs: All labs within the past 24 hours have been reviewed      Pending Diagnostic Studies:       None          Final Active Diagnoses:    Diagnosis Date Noted POA    PRINCIPAL PROBLEM:  Postpartum endometritis [O86.12] 2025 Yes    Puerperal fever, postpartum condition [O86.4] 2025 Yes    Anemia, postpartum [O90.81] 2025 Yes    Bipolar disorder, current episode mixed, mild [F31.61] 01/15/2025 Yes      Problems Resolved During this Admission:        Discharged Condition: good    Disposition: Home or Self Care    Follow Up:keep scheduled postpartum " appt    Patient Instructions:      Diet Adult Regular     Other restrictions (specify):   Order Comments: Showers only for 6 weeks     Pelvic Rest   Order Comments: For 6 wks--no tampons, intercourse, douching     Notify your health care provider if you experience any of the following:  temperature >100.4     Notify your health care provider if you experience any of the following:  persistent nausea and vomiting or diarrhea     Notify your health care provider if you experience any of the following:  severe uncontrolled pain     Notify your health care provider if you experience any of the following:  difficulty breathing or increased cough     Notify your health care provider if you experience any of the following:  increased confusion or weakness     Medications:  Reconciled Home Medications:      Medication List        CONTINUE taking these medications      acetaminophen 325 MG tablet  Commonly known as: TYLENOL  Take 2 tablets (650 mg total) by mouth every 6 (six) hours.     docusate sodium 100 MG capsule  Commonly known as: COLACE  Take 2 capsules (200 mg total) by mouth 2 (two) times daily as needed for Constipation.     ferrous sulfate 325 (65 FE) MG EC tablet  Take 1 tablet (325 mg total) by mouth 3 (three) times daily with meals.     FLUoxetine 10 MG capsule  Take 1 capsule (10 mg total) by mouth once daily.     ibuprofen 600 MG tablet  Commonly known as: ADVIL,MOTRIN  Take 1 tablet (600 mg total) by mouth every 6 (six) hours.     PRENATAL 19 ORAL  Take 1 tablet by mouth once daily.              Caty Mccann MD  Obstetrics & Gynecology  [facility]

## 2025-05-13 NOTE — LACTATION NOTE
Lactation Rounds: Mother readmitted due to infection at this time. Mother has pump already set up at this time. Reviewed proper usage and to adjust suction according to comfort level. Reviewed with mother frequency and duration of pumping in order to promote and maintain full milk supply. Hands on pumping technique reviewed. Instructed mother on cleaning of breast pump parts. Reviewed proper milk handling, collection, storage, and transportation. Voices understanding.     Mother is both latching infant to the breast and will be pumping milk to have EBM in bottle to have father assist with feeding infant while mother gets antibiotic treatments. Mother verbalizes understanding of all education and counseling. Mother denies any further lactation needs or concerns at this time. Discussed lactation availability. Encouraged mother to call for assistance when needs arise.

## 2025-05-13 NOTE — NURSING
Lactation Nurse consulted placed breast feeding and usage of breast pump. Patient wishes to breast feed baby while being admitted in hospital. Educated patient on policy regarding storing milk.

## 2025-05-13 NOTE — SUBJECTIVE & OBJECTIVE
nterval History:     Scheduled Meds:   ampicillin-sulbactam  3 g Intravenous Q6H    FLUoxetine  10 mg Oral Daily    gentamicin  5 mg/kg (Adjusted) Intravenous Q24H    ibuprofen  800 mg Oral Q6H     Continuous Infusions:   lactated ringers   Intravenous Continuous 125 mL/hr at 05/13/25 1814 Rate Verify at 05/13/25 1814     PRN Meds:  Current Facility-Administered Medications:     acetaminophen, 1,000 mg, Oral, Q8H PRN    diphenhydrAMINE, 25 mg, Intravenous, Q4H PRN    Pharmacy to dose Aminoglycosides consult, , , Once **AND** gentamicin - pharmacy to dose, , Intravenous, pharmacy to manage frequency    HYDROcodone-acetaminophen, 1 tablet, Oral, Q4H PRN    HYDROcodone-acetaminophen, 1 tablet, Oral, Q4H PRN    loperamide, 2 mg, Oral, QID PRN    ondansetron, 4 mg, Intravenous, Q6H PRN    sodium chloride 0.9%, 10 mL, Intravenous, PRN    Review of patient's allergies indicates:  No Known Allergies    Objective:     Vital Signs (Most Recent):  Temp: 98.3 °F (36.8 °C) (05/13/25 1707)  Pulse: 82 (05/13/25 1707)  Resp: 16 (05/13/25 1707)  BP: 120/81 (05/13/25 1707)  SpO2: 99 % (05/13/25 1707) Vital Signs (24h Range):  Temp:  [97.7 °F (36.5 °C)-98.5 °F (36.9 °C)] 98.3 °F (36.8 °C)  Pulse:  [74-94] 82  Resp:  [16-18] 16  SpO2:  [98 %-100 %] 99 %  BP: (112-120)/(71-83) 120/81     Weight: 75.8 kg (167 lb)  Body mass index is 33.73 kg/m².  No LMP recorded.    I&O (Last 24H):    Intake/Output Summary (Last 24 hours) at 5/13/2025 1848  Last data filed at 5/13/2025 1814  Gross per 24 hour   Intake 2721.36 ml   Output --   Net 2721.36 ml

## 2025-05-13 NOTE — PROGRESS NOTES
[facility]  Obstetrics & Gynecology  Progress Note    Patient Name: Mayelin Lynn  MRN: [id number]  Admission Date: 2025  Primary Care Provider: Isaura, Primary Doctor  Principal Problem: Postpartum endometritis    Subjective:     HPI:  30 y.o. female  PPD#3 from uncomplicated vaginal delivery.  Patient reports fever, chills, and body aches since last night, as well as moderately heavy bleeding and uterine cramping.  Temp to 101 at home.  No N/V.  No dysuria.  She is breastfeeding her infant.    nterval History:     Scheduled Meds:   ampicillin-sulbactam  3 g Intravenous Q6H    FLUoxetine  10 mg Oral Daily    gentamicin  5 mg/kg (Adjusted) Intravenous Q24H    ibuprofen  800 mg Oral Q6H     Continuous Infusions:   lactated ringers   Intravenous Continuous 125 mL/hr at 25 Rate Verify at 25     PRN Meds:  Current Facility-Administered Medications:     acetaminophen, 1,000 mg, Oral, Q8H PRN    diphenhydrAMINE, 25 mg, Intravenous, Q4H PRN    Pharmacy to dose Aminoglycosides consult, , , Once **AND** gentamicin - pharmacy to dose, , Intravenous, pharmacy to manage frequency    HYDROcodone-acetaminophen, 1 tablet, Oral, Q4H PRN    HYDROcodone-acetaminophen, 1 tablet, Oral, Q4H PRN    loperamide, 2 mg, Oral, QID PRN    ondansetron, 4 mg, Intravenous, Q6H PRN    sodium chloride 0.9%, 10 mL, Intravenous, PRN    Review of patient's allergies indicates:  No Known Allergies    Objective:     Vital Signs (Most Recent):  Temp: 98.3 °F (36.8 °C) (25)  Pulse: 82 (25)  Resp: 16 (25)  BP: 120/81 (25)  SpO2: 99 % (25) Vital Signs (24h Range):  Temp:  [97.7 °F (36.5 °C)-98.5 °F (36.9 °C)] 98.3 °F (36.8 °C)  Pulse:  [74-94] 82  Resp:  [16-18] 16  SpO2:  [98 %-100 %] 99 %  BP: (112-120)/(71-83) 120/81     Weight: 75.8 kg (167 lb)  Body mass index is 33.73 kg/m².  No LMP recorded.    I&O (Last 24H):    Intake/Output Summary (Last 24 hours) at  5/13/2025 1848  Last data filed at 5/13/2025 1814  Gross per 24 hour   Intake 2721.36 ml   Output --   Net 2721.36 ml         Assessment/Plan:     * Postpartum endometritis  Admission for IV antibiotics and observation  Unasyn q 6 hours, Gentamicin q24  Blood culture pending  Repeat CBC in AM  05/13/2025  Remains afebrile  Stable for discharge after 1930 if remains afebrile (pt requesting)    Anemia, postpartum  S/p TXA 1 gram IV and misoprostol on day of admission  Will continue to monitor bleeding  05/13/2025  Minimal lochia    Puerperal fever, postpartum condition  Flu/Covid negative  Most likely source of fever is postpartum endometritis.  Unasyn ordered.  Will continue to monitor.    Bipolar disorder, current episode mixed, mild  Continue home promaric        Caty Mccann MD  Obstetrics & Gynecology  [facility]

## 2025-05-13 NOTE — SUBJECTIVE & OBJECTIVE
Interval History:   Patient reports no problems overnight, minimal lochia, no prob void; tolerating regular diet, +ambulation, breast/bottle feeding  Emotionally well      Scheduled Meds:   ampicillin-sulbactam  3 g Intravenous Q6H    FLUoxetine  10 mg Oral Daily    gentamicin  5 mg/kg (Adjusted) Intravenous Q24H    ibuprofen  800 mg Oral Q6H     Continuous Infusions:   lactated ringers   Intravenous Continuous 125 mL/hr at 05/13/25 0727 New Bag at 05/13/25 0727     PRN Meds:  Current Facility-Administered Medications:     acetaminophen, 1,000 mg, Oral, Q8H PRN    diphenhydrAMINE, 25 mg, Intravenous, Q4H PRN    Pharmacy to dose Aminoglycosides consult, , , Once **AND** gentamicin - pharmacy to dose, , Intravenous, pharmacy to manage frequency    HYDROcodone-acetaminophen, 1 tablet, Oral, Q4H PRN    HYDROcodone-acetaminophen, 1 tablet, Oral, Q4H PRN    loperamide, 2 mg, Oral, QID PRN    ondansetron, 4 mg, Intravenous, Q6H PRN    sodium chloride 0.9%, 10 mL, Intravenous, PRN    Review of patient's allergies indicates:  No Known Allergies    Objective:     Vital Signs (Most Recent):  Temp: 97.7 °F (36.5 °C) (05/13/25 0744)  Pulse: 76 (05/13/25 0744)  Resp: 18 (05/13/25 0744)  BP: 113/72 (05/13/25 0744)  SpO2: 98 % (05/13/25 0744) Vital Signs (24h Range):  Temp:  [97.7 °F (36.5 °C)-98.5 °F (36.9 °C)] 97.7 °F (36.5 °C)  Pulse:  [76-94] 76  Resp:  [18] 18  SpO2:  [98 %-100 %] 98 %  BP: (108-118)/(71-83) 113/72     Weight: 75.8 kg (167 lb)  Body mass index is 33.73 kg/m².  No LMP recorded.    I&O (Last 24H):    Intake/Output Summary (Last 24 hours) at 5/13/2025 1046  Last data filed at 5/13/2025 0648  Gross per 24 hour   Intake 2702.91 ml   Output --   Net 2702.91 ml         Laboratory:  Recent Lab Results         05/13/25  0634   05/12/25  1450        Baso # 0.06         Basophil % 0.4         Eos # 0.36         Eos % 2.6         Gentamicin, Random   1.7  Comment: Therapeutic range is not established for random  specimens.       Gentamicin, Trough <0.5         Gran # (ANC) 10.70         Hematocrit 25.4         Hemoglobin 7.3         Immature Grans (Abs) 0.18  Comment: Mild elevation in immature granulocytes is non specific and can be seen in a variety of conditions including stress response, acute inflammation, trauma and pregnancy. Correlation with other laboratory and clinical findings is essential.         Immature Granulocytes 1.3         Lymph # 1.74         Lymph % 12.6         MCH 21.9         MCHC 28.7         MCV 76         Mono # 0.75         Mono % 5.4         MPV 8.8         Neut % 77.7         nRBC 0         Platelet Count 265         RBC 3.33         RDW 18.6         WBC 13.79               I have personallly reviewed all pertinent lab results from the last 24 hours.    Diagnostic Results:  Labs: Reviewed     Physical Exam:   Constitutional: She is oriented to person, place, and time. She appears well-developed.    HENT:   Head: Normocephalic.    Eyes: Pupils are equal, round, and reactive to light.     Cardiovascular:  Normal rate and regular rhythm.             Pulmonary/Chest: Effort normal and breath sounds normal.        Abdominal: Soft. Bowel sounds are normal. She exhibits no abdominal incision.     Genitourinary:    Genitourinary Comments: Ut--firm, non tender             Musculoskeletal: Normal range of motion.       Neurological: She is alert and oriented to person, place, and time.    Skin: Skin is warm and dry.    Psychiatric: She has a normal mood and affect. Her behavior is normal. Judgment and thought content normal.        Review of Systems

## 2025-05-13 NOTE — LACTATION NOTE
Lactation Rounds:    Upon arrival mother nursing infant in cradle position on the right breast. Shallow latch and poor position noted, mother reports slight pinching pain. Baby is showing feeding cues. Helped mother to settle in a cross cradle hold position on the right breast. Reviewed deep asymmetric latch and proper positioning. Mother is able to demonstrate back and deep latch easily obtained. Audible swallows noted, and mother denies pain or discomfort. Baby fed until content, and nipple shape and color is WDL upon unlatching. Reviewed hand expression and nipple care; mother able to return demonstrate.      Breast: right & left      Position [x] cross cradle [x] cradle [] football [] laid-back   Gape [] narrow [x] adequate [] wide []    Latch [] shallow [] moderate [x] deep [] difficulty finding nipple    [x] successful []unsuccessful [x] required intervention [] full assist   Lip flange [] top flanged [x] bottom flanged [x] top tucked [] bottom tucked   Oral seal [x] adequate [] poor []    Cheeks [x] round [] dimpled [] broken cheek line [] flat   Jaw [] piston [x] rocker [] chomping [] fasciculations   Maternal pain [x] none [] mild [] moderate [] severe   Swallow [x] visible [x] audible [] gulping []    Swallow rate [] 2:1 [] high suck to swallow [x] frequent pauses [x]variable   Difficulties [] milk leaking [] choking/coughing [] arching [] unsustained tongue extension    [] clicking [] crease above upper lip [] lip blanching [] fatigue     [] labored breathing [] nasal flaring [] stridor [] increased work of breathing    [] pop-offs [] vasospasm []   Minutes fed: 19 mins     Maternal nipple shape  [x] WNL [] lipstick [] compressed [] white line   Baby after feeding [] content [] sleepy [x] showing feeding cues [] alert    []fatigued [] fussy [x]Mother supplemented with 1 oz of EBM via bottle, infant tolerated feeding well.     Plan:    Feed based on feeding cues.  Skin to skin every 2-3 hours if no feeding  cues.  WAKE INFANT if no feeding 3 hours from beginning of last feeding.  Attempt latching baby for 10-15 minutes. If feeding is not nutritive, or infant is still making feeding cues after nursing on both breast   Supplement with all expressed breast milk available (from previous pumping/hand expression session).  In the absence of breastmilk give formula as recommended by pediatrician.   Pump for 15- 20 mins using hands on expression, hand express and collect all available colustrum for baby, save for next feeding.    Reviewed proper usage of [employer] electric hospital grade pump and to adjust suction according to comfort level. Reviewed with mother frequency and duration of pumping in order to promote and maintain full milk supply. Hands on pumping technique reviewed. . Instructed mother on cleaning of breast pump parts. Reviewed proper milk handling, collection, storage, and transportation. Voices understanding.     Mother is currently using 24 mm flanges. These were verified to be correct sizes.    Written instructions have been provided and were reviewed at this time. Hand expression reviewed, mother able to return demonstrate.Encouraged mother to contact lactation with any questions, concerns, or problems. Contact numbers provided, and mother verbalizes understanding.      Expected oral intake per feeding (according to [employer]) & expected output for each day of life:  Day 2: 5-15 mL per feeding, 2 voids, 2 stools  Day 3: 15-30 mL per feeding, 3 voids, 3 stools  Day 4: 30-60 mL per feeding, 4 voids, 3 stools     Consider rental of hospital grade pump if primarily pumping for infants 1st month of life.    This might seem like a busy plan, but this plan allows us to feed the baby, and maintain milk supply!    Feed the baby. A baby who is getting the right amount of calories and nutrition is best able to learn how to nurse. First choice for what to feed a non-nursing baby is moms  own milk.   Maintain milk supply. If moms milk supply is being maintained with an appropriate frequency and amount of milk expression, more time is available for baby to learn to nurse, and babys efforts will be better rewarded (with more milk).     Mother has a [employer] breastpump at home.  Mother reports infant is seeing pediatrician tomorrow. Instructed to call lactation warmline to set up outpatient appointment as needed.     Mother verbalizes understanding of all education and counseling. Mother denies any further lactation needs or concerns at this time. Discussed lactation availability. Encouraged mother to call for assistance when needs arise.    Mother anticipates discharge home this evening. Reviewed signs of good attachment. Reviewed breast massage and compression during feedings and indications for use. Reviewed signs of effective milk transfer and instructed to call pediatrician and lactation if signs not present. Discussed expected feeding and output pattern for days of life 2, 3, 4, & 5+; mother instructed to call pediatrician and lactation if infant is not meeting feeding and output goals.     Discussed resources for medication safety while breastfeeding. Reviewed available outpatient lactation resources. SIDS education reviewed.    Mother verbalizes understanding of all education and counseling; she denies any further lactation needs or concerns at this time. Encouraged mother to contact lactation with any questions, concerns, or problems, contact number provided.

## 2025-05-13 NOTE — ASSESSMENT & PLAN NOTE
S/p TXA 1 gram IV and misoprostol on day of admission  Will continue to monitor bleeding  05/13/2025  Minimal lochia

## 2025-05-14 NOTE — NURSING
Discharge Instructions  and Aftercare reviewed with patient.  Patient verbalized understanding of the instructions provided.  IV access removed - No redness or swelling noted at the site.  Awaiting patient escort for discharge.

## 2025-05-16 LAB
BACTERIA BLD CULT: NORMAL
BACTERIA BLD CULT: NORMAL

## 2025-06-19 ENCOUNTER — LAB VISIT (OUTPATIENT)
Dept: LAB | Facility: HOSPITAL | Age: 30
End: 2025-06-19
Attending: ADVANCED PRACTICE MIDWIFE
Payer: MEDICAID

## 2025-06-19 ENCOUNTER — POSTPARTUM VISIT (OUTPATIENT)
Dept: OBSTETRICS AND GYNECOLOGY | Facility: CLINIC | Age: 30
End: 2025-06-19
Payer: MEDICAID

## 2025-06-19 VITALS
HEIGHT: 59 IN | BODY MASS INDEX: 31.92 KG/M2 | SYSTOLIC BLOOD PRESSURE: 111 MMHG | DIASTOLIC BLOOD PRESSURE: 75 MMHG | WEIGHT: 158.31 LBS | HEART RATE: 92 BPM

## 2025-06-19 DIAGNOSIS — E03.8 OTHER SPECIFIED HYPOTHYROIDISM: ICD-10-CM

## 2025-06-19 DIAGNOSIS — F31.61 BIPOLAR DISORDER, CURRENT EPISODE MIXED, MILD: ICD-10-CM

## 2025-06-19 PROCEDURE — 99213 OFFICE O/P EST LOW 20 MIN: CPT | Mod: PBBFAC,TH | Performed by: ADVANCED PRACTICE MIDWIFE

## 2025-06-19 PROCEDURE — 99999 PR PBB SHADOW E&M-EST. PATIENT-LVL III: CPT | Mod: PBBFAC,,, | Performed by: ADVANCED PRACTICE MIDWIFE

## 2025-06-19 PROCEDURE — 84443 ASSAY THYROID STIM HORMONE: CPT

## 2025-06-19 PROCEDURE — 36415 COLL VENOUS BLD VENIPUNCTURE: CPT

## 2025-06-19 PROCEDURE — 84439 ASSAY OF FREE THYROXINE: CPT

## 2025-06-19 RX ORDER — HYDROXYZINE PAMOATE 50 MG/1
50 CAPSULE ORAL EVERY 8 HOURS PRN
Qty: 20 CAPSULE | Refills: 0 | Status: SHIPPED | OUTPATIENT
Start: 2025-06-19

## 2025-06-20 ENCOUNTER — RESULTS FOLLOW-UP (OUTPATIENT)
Dept: OBSTETRICS AND GYNECOLOGY | Facility: CLINIC | Age: 30
End: 2025-06-20

## 2025-06-20 PROBLEM — Z67.91 RH NEGATIVE, ANTEPARTUM: Status: RESOLVED | Noted: 2018-11-12 | Resolved: 2025-06-20

## 2025-06-20 PROBLEM — O26.899 RH NEGATIVE, ANTEPARTUM: Status: RESOLVED | Noted: 2018-11-12 | Resolved: 2025-06-20

## 2025-06-20 LAB
T4 FREE SERPL-MCNC: 0.99 NG/DL (ref 0.71–1.51)
TSH SERPL-ACNC: 3.43 UIU/ML (ref 0.4–4)

## 2025-06-20 NOTE — PROGRESS NOTES
"  CC: Post-partum follow-up    Mayelin Lynn is a 30 y.o. female  who presents for post-partum visit.  She is S/P a .  She and the baby are doing well.  No pain.  No fever.   No bowel / bladder complaints.    Delivery Date: May 8, 2025  Delivering provider: Nolvia  Gender: female  Birth Weight: 8 pounds 3 ounces  Breast Feeding: YES  Depression: NO Carney  depression scale 0/30  Contraception: bilateral tubal ligation    Pregnancy was complicated by:  none    /75 (BP Location: Left arm, Patient Position: Sitting)   Pulse 92   Ht 4' 11" (1.499 m)   Wt 71.8 kg (158 lb 4.6 oz)   Breastfeeding Yes   BMI 31.97 kg/m²     ROS:  GENERAL: No fever, chills, fatigability.  VULVAR: No pain, no lesions and no itching.  VAGINAL: No relaxation, no itching, no discharge, no abnormal bleeding and no lesions.  ABDOMEN: No abdominal pain. Denies nausea. Denies vomiting. No diarrhea. No constipation  BREAST: Denies pain. No lumps. No discharge.  URINARY: No incontinence, no nocturia, no frequency and no dysuria.  CARDIOVASCULAR: No chest pain. No shortness of breath. No leg cramps.  NEUROLOGICAL: No headaches. No vision changes.    PHYSICAL EXAM:    Had first degree and is due for pap.  Declines pap and pelvic today  Assessment/Plan:      1. Postpartum endometritis (Primary)  Was readmitted,notes reviewed    2. Other specified hypothyroidism  - TSH; Future  - T4, Free; Future    3. Bipolar disorder, current episode mixed, mild  States is stable and needs no meds    4. Routine postpartum follow-up     Doing well S/P   Contraceptive counseling BTL consent signed  Instructions / precautions reviewed  May resume normal activities  Aware needs Pap       "

## 2025-06-23 ENCOUNTER — TELEPHONE (OUTPATIENT)
Dept: OBSTETRICS AND GYNECOLOGY | Facility: CLINIC | Age: 30
End: 2025-06-23
Payer: MEDICAID

## 2025-06-23 NOTE — TELEPHONE ENCOUNTER
Contacted pt to confirm surgery scheduling.    No answer, left voicemail to return call to clinic.     Delivt message sent.

## 2025-06-23 NOTE — TELEPHONE ENCOUNTER
----- Message from Tea Evans PA-C sent at 6/20/2025 11:31 AM CDT -----  Regarding: Needs BiSalp  Hi Maeve,  This patient is wanted a bisalp and doesn't have a surgeon preference so I'm assigning Dr. Villarreal as her surgeon. Thanks for your help getting it scheduled.  ----- Message -----  From: Carmelita Delgado CNM  Sent: 6/20/2025  10:33 AM CDT  To: Tea Evans PA-C    I am sorry to directly text you again but I can not get the surgery scheduling pool message to pull up.  This patient signed consent for PP BTL at her visit yesterday. Becky and CONY

## 2025-06-24 ENCOUNTER — TELEPHONE (OUTPATIENT)
Dept: OBSTETRICS AND GYNECOLOGY | Facility: CLINIC | Age: 30
End: 2025-06-24
Payer: MEDICAID

## 2025-06-24 NOTE — TELEPHONE ENCOUNTER
Returned pt call regarding surgery scheduling.     No answer, left voicemail to return call to clinic.

## 2025-06-24 NOTE — TELEPHONE ENCOUNTER
Copied from CRM #[id number]. Topic: General Inquiry - Return Call  >> Jun 23, 2025 11:40 AM Kelsey wrote:  Type:  Patient Returning Call    Who Called:Mayelin Lynn   Who Left Message for Patient:Nurse Nuzhat  Does the patient know what this is regarding?:schedule a surgery  Would the patient rather a call back or a response via [facility]? Call back  Best Call Back Number:[phone]  Thanks!

## 2025-06-30 RX ORDER — HYDROXYZINE PAMOATE 50 MG/1
50 CAPSULE ORAL EVERY 8 HOURS PRN
Qty: 20 CAPSULE | Refills: 0 | Status: SHIPPED | OUTPATIENT
Start: 2025-06-30

## 2025-07-15 ENCOUNTER — TELEPHONE (OUTPATIENT)
Dept: OBSTETRICS AND GYNECOLOGY | Facility: CLINIC | Age: 30
End: 2025-07-15
Payer: MEDICAID

## 2025-07-15 NOTE — TELEPHONE ENCOUNTER
----- Message from COLT Chase sent at 7/15/2025  3:34 PM CDT -----  Regarding: PATIENT NOT ANSWERING FOR SX ARRIVAL TIME  Hello,    This patient is having surgery tomorrow. I have tried calling this patient twice and left her voice mails each time regarding her sx arrival time. I also sent her my chart messages.     Thank you